# Patient Record
Sex: FEMALE | Race: BLACK OR AFRICAN AMERICAN | URBAN - METROPOLITAN AREA
[De-identification: names, ages, dates, MRNs, and addresses within clinical notes are randomized per-mention and may not be internally consistent; named-entity substitution may affect disease eponyms.]

---

## 2017-04-09 ENCOUNTER — EMERGENCY (EMERGENCY)
Facility: HOSPITAL | Age: 47
LOS: 1 days | Discharge: HOME | End: 2017-04-09
Admitting: EMERGENCY MEDICINE
Payer: MEDICAID

## 2017-04-09 VITALS
TEMPERATURE: 98 F | DIASTOLIC BLOOD PRESSURE: 79 MMHG | SYSTOLIC BLOOD PRESSURE: 121 MMHG | RESPIRATION RATE: 18 BRPM | HEART RATE: 74 BPM

## 2017-04-09 DIAGNOSIS — Z90.13 ACQUIRED ABSENCE OF BILATERAL BREASTS AND NIPPLES: Chronic | ICD-10-CM

## 2017-04-09 PROCEDURE — 99283 EMERGENCY DEPT VISIT LOW MDM: CPT

## 2017-04-09 NOTE — ED PROVIDER NOTE - PSH
History of  Section  '  and 2007  History of mastectomy, bilateral    Normal vaginal delivery  /  /    S/P knee surgery  ' :  Right Knee Arthroscopy  S/P tubal ligation  '

## 2017-04-09 NOTE — ED PROVIDER NOTE - NEURO NEGATIVE STATEMENT, MLM
no hemoptysis/no exertional dyspnea/no cough/no orthopnea no loss of consciousness, no gait abnormality, no headache, no sensory deficits, and no weakness.

## 2017-04-09 NOTE — ED PROVIDER NOTE - PMH
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 No surgery  Sickle cell trait

## 2017-04-09 NOTE — ED PROVIDER NOTE - OBJECTIVE STATEMENT
45 yo F with a PMHx of breast cancer (mastectomy 2013) scleritis, and pseudotumor of R orbit presents to the ED c/o irritated red eyes, photophobia, blurred vision. She states her pseudotumor and scleritis did not feel like this. She takes Motrin for HA. Denies HA, nausea, vomiting, foreign objects in eyes, eye discharge, double vision. She states she got fake eye lashes put on last Thursday and got them out yesterday. She claims her vision is currently better. 47 yo F with a PMHx of breast cancer (mastectomy 2013) scleritis, and pseudotumor of R orbit presents to the ED c/o irritated red eyes, photophobia, blurred vision. She states she got fake eye lashes put on last Thursday and got them out yesterday.  Pain is different from previous eye complaints.  She takes Motrin for HA. Denies HA, nausea, vomiting, foreign objects in eyes, eye discharge, double vision.  She claims her vision is currently better.

## 2017-04-09 NOTE — ED ADULT TRIAGE NOTE - CHIEF COMPLAINT QUOTE
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

## 2017-04-09 NOTE — ED PROVIDER NOTE - PLAN OF CARE
Follow up with your ophthalmologist this week.  Natural tears as needed.  Worsening pain, blurry vision, or any other symptoms of concern return to ED.

## 2017-04-09 NOTE — ED PROVIDER NOTE - MEDICAL DECISION MAKING DETAILS
Plan: discharge with optho follow up 45 yo F PMH scleritis, pseudotumor p/f eye irritation after removing false lashes, now improved, no foreign body or discharge noted on exam - natural tears, discharge with ophthalmology follow up

## 2017-04-09 NOTE — ED PROVIDER NOTE - PROGRESS NOTE DETAILS
The scribe's documentation has been prepared under my direction and personally reviewed by me in its entirety. I confirm that the note above accurately reflects all work, treatment, procedures, and medical decision making performed by me.  Isael Weaver PA-C

## 2017-04-09 NOTE — ED PROVIDER NOTE - CARE PLAN
Principal Discharge DX:	Eye redness  Instructions for follow-up, activity and diet:	Follow up with your ophthalmologist this week.  Natural tears as needed.  Worsening pain, blurry vision, or any other symptoms of concern return to ED.

## 2017-05-04 ENCOUNTER — APPOINTMENT (OUTPATIENT)
Dept: OPHTHALMOLOGY | Facility: CLINIC | Age: 47
End: 2017-05-04

## 2017-06-13 ENCOUNTER — APPOINTMENT (OUTPATIENT)
Dept: OPHTHALMOLOGY | Facility: CLINIC | Age: 47
End: 2017-06-13

## 2017-10-23 PROBLEM — Z00.00 ENCOUNTER FOR PREVENTIVE HEALTH EXAMINATION: Noted: 2017-10-23

## 2017-10-31 ENCOUNTER — APPOINTMENT (OUTPATIENT)
Dept: OPHTHALMOLOGY | Facility: CLINIC | Age: 47
End: 2017-10-31

## 2017-11-20 ENCOUNTER — INPATIENT (INPATIENT)
Facility: HOSPITAL | Age: 47
LOS: 1 days | Discharge: PSYCHIATRIC OR SUBSTANCE USE FACILITY | End: 2017-11-22
Attending: INTERNAL MEDICINE | Admitting: INTERNAL MEDICINE
Payer: MEDICARE

## 2017-11-20 VITALS
HEART RATE: 104 BPM | WEIGHT: 130.07 LBS | DIASTOLIC BLOOD PRESSURE: 54 MMHG | SYSTOLIC BLOOD PRESSURE: 97 MMHG | HEIGHT: 64 IN | OXYGEN SATURATION: 95 % | RESPIRATION RATE: 26 BRPM

## 2017-11-20 DIAGNOSIS — T50.902A POISONING BY UNSPECIFIED DRUGS, MEDICAMENTS AND BIOLOGICAL SUBSTANCES, INTENTIONAL SELF-HARM, INITIAL ENCOUNTER: ICD-10-CM

## 2017-11-20 DIAGNOSIS — Z90.13 ACQUIRED ABSENCE OF BILATERAL BREASTS AND NIPPLES: Chronic | ICD-10-CM

## 2017-11-20 LAB
ALBUMIN SERPL ELPH-MCNC: 3 G/DL — LOW (ref 3.3–5)
ALP SERPL-CCNC: 78 U/L — SIGNIFICANT CHANGE UP (ref 40–120)
ALT FLD-CCNC: 26 U/L — SIGNIFICANT CHANGE UP (ref 12–78)
AMPHET UR-MCNC: NEGATIVE — SIGNIFICANT CHANGE UP
ANION GAP SERPL CALC-SCNC: 11 MMOL/L — SIGNIFICANT CHANGE UP (ref 5–17)
APAP SERPL-MCNC: < 2 UG/ML (ref 10–30)
APPEARANCE UR: CLEAR — SIGNIFICANT CHANGE UP
AST SERPL-CCNC: 17 U/L — SIGNIFICANT CHANGE UP (ref 15–37)
BACTERIA # UR AUTO: ABNORMAL
BARBITURATES UR SCN-MCNC: NEGATIVE — SIGNIFICANT CHANGE UP
BASE EXCESS BLDA CALC-SCNC: -1.1 MMOL/L — SIGNIFICANT CHANGE UP (ref -2–2)
BASOPHILS NFR BLD AUTO: 1 % — SIGNIFICANT CHANGE UP (ref 0–2)
BENZODIAZ UR-MCNC: NEGATIVE — SIGNIFICANT CHANGE UP
BILIRUB SERPL-MCNC: 0.2 MG/DL — SIGNIFICANT CHANGE UP (ref 0.2–1.2)
BILIRUB UR-MCNC: NEGATIVE — SIGNIFICANT CHANGE UP
BLOOD GAS COMMENTS: SIGNIFICANT CHANGE UP
BLOOD GAS COMMENTS: SIGNIFICANT CHANGE UP
BLOOD GAS SOURCE: SIGNIFICANT CHANGE UP
BUN SERPL-MCNC: 11 MG/DL — SIGNIFICANT CHANGE UP (ref 7–23)
CALCIUM SERPL-MCNC: 7.9 MG/DL — LOW (ref 8.5–10.1)
CHLORIDE SERPL-SCNC: 106 MMOL/L — SIGNIFICANT CHANGE UP (ref 96–108)
CK SERPL-CCNC: 165 U/L — SIGNIFICANT CHANGE UP (ref 26–192)
CO2 SERPL-SCNC: 24 MMOL/L — SIGNIFICANT CHANGE UP (ref 22–31)
COCAINE METAB.OTHER UR-MCNC: NEGATIVE — SIGNIFICANT CHANGE UP
COLOR SPEC: YELLOW — SIGNIFICANT CHANGE UP
COMMENT - URINE: SIGNIFICANT CHANGE UP
CREAT SERPL-MCNC: 0.91 MG/DL — SIGNIFICANT CHANGE UP (ref 0.5–1.3)
DIFF PNL FLD: ABNORMAL
EOSINOPHIL NFR BLD AUTO: 2 % — SIGNIFICANT CHANGE UP (ref 0–6)
EPI CELLS # UR: ABNORMAL
ETHANOL SERPL-MCNC: 198 MG/DL — HIGH (ref 0–10)
GLUCOSE SERPL-MCNC: 154 MG/DL — HIGH (ref 70–99)
GLUCOSE UR QL: NEGATIVE MG/DL — SIGNIFICANT CHANGE UP
HCO3 BLDA-SCNC: 23 MMOL/L — SIGNIFICANT CHANGE UP (ref 21–29)
HCT VFR BLD CALC: 32.1 % — LOW (ref 34.5–45)
HGB BLD-MCNC: 10.4 G/DL — LOW (ref 11.5–15.5)
HOROWITZ INDEX BLDA+IHG-RTO: 21 — SIGNIFICANT CHANGE UP
KETONES UR-MCNC: NEGATIVE — SIGNIFICANT CHANGE UP
LEUKOCYTE ESTERASE UR-ACNC: NEGATIVE — SIGNIFICANT CHANGE UP
LYMPHOCYTES # BLD AUTO: 25 % — SIGNIFICANT CHANGE UP (ref 13–44)
MAGNESIUM SERPL-MCNC: 2.1 MG/DL — SIGNIFICANT CHANGE UP (ref 1.6–2.6)
MCHC RBC-ENTMCNC: 26 PG — LOW (ref 27–34)
MCHC RBC-ENTMCNC: 32.5 GM/DL — SIGNIFICANT CHANGE UP (ref 32–36)
MCV RBC AUTO: 80 FL — SIGNIFICANT CHANGE UP (ref 80–100)
METHADONE UR-MCNC: NEGATIVE — SIGNIFICANT CHANGE UP
MONOCYTES NFR BLD AUTO: 5 % — SIGNIFICANT CHANGE UP (ref 2–14)
NEUTROPHILS NFR BLD AUTO: 67 % — SIGNIFICANT CHANGE UP (ref 43–77)
NITRITE UR-MCNC: NEGATIVE — SIGNIFICANT CHANGE UP
OPIATES UR-MCNC: NEGATIVE — SIGNIFICANT CHANGE UP
PCO2 BLDA: 40 MMHG — SIGNIFICANT CHANGE UP (ref 32–46)
PCP SPEC-MCNC: SIGNIFICANT CHANGE UP
PCP SPEC-MCNC: SIGNIFICANT CHANGE UP
PCP UR-MCNC: NEGATIVE — SIGNIFICANT CHANGE UP
PH BLD: 7.38 — SIGNIFICANT CHANGE UP (ref 7.35–7.45)
PH UR: 5 — SIGNIFICANT CHANGE UP (ref 5–8)
PLAT MORPH BLD: NORMAL — SIGNIFICANT CHANGE UP
PLATELET # BLD AUTO: 214 K/UL — SIGNIFICANT CHANGE UP (ref 150–400)
PO2 BLDA: 97 MMHG — SIGNIFICANT CHANGE UP (ref 74–108)
POTASSIUM SERPL-MCNC: 2.9 MMOL/L — CRITICAL LOW (ref 3.5–5.3)
POTASSIUM SERPL-SCNC: 2.9 MMOL/L — CRITICAL LOW (ref 3.5–5.3)
PROT SERPL-MCNC: 7 GM/DL — SIGNIFICANT CHANGE UP (ref 6–8.3)
PROT UR-MCNC: NEGATIVE MG/DL — SIGNIFICANT CHANGE UP
RBC # BLD: 4.01 M/UL — SIGNIFICANT CHANGE UP (ref 3.8–5.2)
RBC # FLD: 15 % — SIGNIFICANT CHANGE UP (ref 11–15)
RBC BLD AUTO: NORMAL — SIGNIFICANT CHANGE UP
RBC CASTS # UR COMP ASSIST: SIGNIFICANT CHANGE UP /HPF (ref 0–4)
SALICYLATES SERPL-MCNC: <1.7 MG/DL — LOW (ref 2.8–20)
SAO2 % BLDA: 96 % — SIGNIFICANT CHANGE UP (ref 92–96)
SODIUM SERPL-SCNC: 141 MMOL/L — SIGNIFICANT CHANGE UP (ref 135–145)
SP GR SPEC: 1.01 — SIGNIFICANT CHANGE UP (ref 1.01–1.02)
THC UR QL: NEGATIVE — SIGNIFICANT CHANGE UP
UROBILINOGEN FLD QL: NEGATIVE MG/DL — SIGNIFICANT CHANGE UP
WBC # BLD: 5.9 K/UL — SIGNIFICANT CHANGE UP (ref 3.8–10.5)
WBC # FLD AUTO: 5.9 K/UL — SIGNIFICANT CHANGE UP (ref 3.8–10.5)
WBC UR QL: SIGNIFICANT CHANGE UP

## 2017-11-20 PROCEDURE — 99291 CRITICAL CARE FIRST HOUR: CPT

## 2017-11-20 PROCEDURE — 70450 CT HEAD/BRAIN W/O DYE: CPT | Mod: 26

## 2017-11-20 PROCEDURE — 72125 CT NECK SPINE W/O DYE: CPT | Mod: 26

## 2017-11-20 PROCEDURE — 71010: CPT | Mod: 26

## 2017-11-20 RX ORDER — ENOXAPARIN SODIUM 100 MG/ML
40 INJECTION SUBCUTANEOUS EVERY 24 HOURS
Qty: 0 | Refills: 0 | Status: DISCONTINUED | OUTPATIENT
Start: 2017-11-20 | End: 2017-11-22

## 2017-11-20 RX ORDER — SODIUM CHLORIDE 9 MG/ML
2000 INJECTION INTRAMUSCULAR; INTRAVENOUS; SUBCUTANEOUS ONCE
Qty: 0 | Refills: 0 | Status: COMPLETED | OUTPATIENT
Start: 2017-11-20 | End: 2017-11-20

## 2017-11-20 RX ORDER — SODIUM CHLORIDE 9 MG/ML
1000 INJECTION, SOLUTION INTRAVENOUS
Qty: 0 | Refills: 0 | Status: DISCONTINUED | OUTPATIENT
Start: 2017-11-20 | End: 2017-11-20

## 2017-11-20 RX ORDER — POTASSIUM CHLORIDE 20 MEQ
20 PACKET (EA) ORAL
Qty: 0 | Refills: 0 | Status: DISCONTINUED | OUTPATIENT
Start: 2017-11-20 | End: 2017-11-20

## 2017-11-20 RX ORDER — SODIUM BICARBONATE 1 MEQ/ML
50 SYRINGE (ML) INTRAVENOUS ONCE
Qty: 0 | Refills: 0 | Status: COMPLETED | OUTPATIENT
Start: 2017-11-20 | End: 2017-11-20

## 2017-11-20 RX ORDER — POTASSIUM CHLORIDE 20 MEQ
10 PACKET (EA) ORAL
Qty: 0 | Refills: 0 | Status: COMPLETED | OUTPATIENT
Start: 2017-11-20 | End: 2017-11-20

## 2017-11-20 RX ORDER — NALOXONE HYDROCHLORIDE 4 MG/.1ML
2 SPRAY NASAL ONCE
Qty: 0 | Refills: 0 | Status: COMPLETED | OUTPATIENT
Start: 2017-11-20 | End: 2017-11-20

## 2017-11-20 RX ADMIN — SODIUM CHLORIDE 2000 MILLILITER(S): 9 INJECTION INTRAMUSCULAR; INTRAVENOUS; SUBCUTANEOUS at 01:32

## 2017-11-20 RX ADMIN — Medication 100 MILLIEQUIVALENT(S): at 04:21

## 2017-11-20 RX ADMIN — Medication 100 MILLIEQUIVALENT(S): at 05:00

## 2017-11-20 RX ADMIN — ENOXAPARIN SODIUM 40 MILLIGRAM(S): 100 INJECTION SUBCUTANEOUS at 06:29

## 2017-11-20 RX ADMIN — SODIUM CHLORIDE 75 MILLILITER(S): 9 INJECTION, SOLUTION INTRAVENOUS at 12:41

## 2017-11-20 RX ADMIN — Medication 50 MILLIEQUIVALENT(S): at 01:24

## 2017-11-20 RX ADMIN — NALOXONE HYDROCHLORIDE 2 MILLIGRAM(S): 4 SPRAY NASAL at 01:10

## 2017-11-20 RX ADMIN — Medication 100 MILLIEQUIVALENT(S): at 03:10

## 2017-11-20 NOTE — ED PROVIDER NOTE - OBJECTIVE STATEMENT
Pertinent PMH/PSH/FHx/SHx and Review of Systems contained within:  48 yo f with PMH of breast Ca s/p bilateral total masectomy and depression BIBEMS for od. As per , patient reported feeling drowsy about 1140 but then later tonight she became unresponsive and he found her empty bottle of celexa. it is unknown what time she took them but the bottle had 29 100mg pills in it.  also reports that she took an unknown amount of Buspar. Pertinent PMH/PSH/FHx/SHx and Review of Systems contained within:  48 yo f with PMH of breast Ca s/p bilateral total masectomy and depression BIBEMS for od. As per , patient reported feeling drowsy about 1140 but then later tonight she became unresponsive and he found her empty bottle of celexa. it is unknown what time she took them but the bottle had 29 100mg pills in it.  also reports that she took an unknown amount of Buspar. Patient responding to pain only and not answering questions.

## 2017-11-20 NOTE — H&P ADULT - ASSESSMENT
Patient is a 47 year old F with history of breast CA s/p mastectomy, admitted to critical care for suicidal attempts with Overdose on SSRI and Alcohol intoxication.

## 2017-11-20 NOTE — H&P ADULT - HISTORY OF PRESENT ILLNESS
Patient is a 47y old  Female who presents with a chief complaint of     47y FemaleHPI:      PAST MEDICAL & SURGICAL HISTORY:  No pertinent past medical history  No significant past surgical history    FAMILY HISTORY:    Social History: Allergies    No Known Allergies    Intolerances        MEDICATIONS  (STANDING):  enoxaparin Injectable 40 milliGRAM(s) SubCutaneous every 24 hours    MEDICATIONS  (PRN):      REVIEW OF SYSTEMS:    CONSTITUTIONAL: No fever, weight loss, or fatigue  EYES: No eye pain, visual disturbances, or discharge  ENMT:  No difficulty hearing, tinnitus, vertigo; No sinus or throat pain  NECK: No pain or stiffness  BREASTS: No pain, masses, or nipple discharge  RESPIRATORY: No cough, wheezing, chills or hemoptysis; No shortness of breath  CARDIOVASCULAR: No chest pain, palpitations, dizziness, or leg swelling  GASTROINTESTINAL: No abdominal or epigastric pain. No nausea, vomiting, or hematemesis; No diarrhea or constipation. No melena or hematochezia.  GENITOURINARY: No dysuria, frequency, hematuria, or incontinence  NEUROLOGICAL: No headaches, memory loss, loss of strength, numbness, or tremors  SKIN: No itching, burning, rashes, or lesions   LYMPH NODES: No enlarged glands  ENDOCRINE: No heat or cold intolerance; No hair loss  MUSCULOSKELETAL: No joint pain or swelling; No muscle, back, or extremity pain  PSYCHIATRIC: No depression, anxiety, mood swings, or difficulty sleeping  HEME/LYMPH: No easy bruising, or bleeding gums  ALLERGY AND IMMUNOLOGIC: No hives or eczema      PHYSICAL EXAM:  ICU Vital Signs Last 24 Hrs  T(C): 36.6 (2017 05:50), Max: 36.6 (2017 05:50)  T(F): 97.8 (2017 05:50), Max: 97.8 (2017 05:50)  HR: 85 (2017 06:23) (85 - 110)  BP: 114/67 (2017 06:23) (88/52 - 119/77)  BP(mean): --  ABP: --  ABP(mean): --  RR: 13 (2017 06:23) (9 - 26)  SpO2: 96% (2017 06:23) (95% - 99%)    GENERAL: NAD, well-groomed, well-developed  HEAD:  Atraumatic, Normocephalic  EYES: EOMI, PERRLA, conjunctiva and sclera clear  NECK: Supple, No JVD, Normal thyroid  NERVOUS SYSTEM:  Alert & Oriented X3, Good concentration;   Motor Strength 5/5 B/L upper and lower extremities; DTRs 2+ intact and symmetric  CHEST/LUNG: CTAbilaterally; No rales, rhonchi, wheezing, or rubs  HEART: Regular rate and rhythm; No murmurs, rubs, or gallops  ABDOMEN: Soft, Nontender, Nondistended; Bowel sounds present  EXTREMITIES:  2+ Peripheral Pulses, No clubbing, cyanosis, or edema  SKIN: No rashes or lesions        LABS:                        10.4   5.9   )-----------( 214      ( 2017 01:17 )             32.1     11-    141  |  106  |  11  ----------------------------<  154<H>  2.9<LL>   |  24  |  0.91    Ca    7.9<L>      2017 01:17  Mg     2.1         TPro  7.0  /  Alb  3.0<L>  /  TBili  0.2  /  DBili  x   /  AST  17  /  ALT  26  /  AlkPhos  78  11-20      Urinalysis Basic - ( 2017 02:45 )    Color: Yellow / Appearance: Clear / S.015 / pH: x  Gluc: x / Ketone: Negative  / Bili: Negative / Urobili: Negative mg/dL   Blood: x / Protein: Negative mg/dL / Nitrite: Negative   Leuk Esterase: Negative / RBC: 0-2 /HPF / WBC 0-2   Sq Epi: x / Non Sq Epi: Moderate / Bacteria: Few Patient is a 47y old  Female with breast Ca s/p bilateral total mastectomy and depression  brought in by  for drug overdose. As per , patient reported feeling drowsy about 1140 am yesterday but then later tonight when  came home, he found patient  unresponsive and found a empty bottle of celexa as per ED note. it is unknown what time she took them but the bottle had 29 100mg pills in it.  also reports that she took an unknown amount of Buspar. Patient responding to pain only and not answering questions. ETOH level was 198, as per , patient does not drink alcohol. Patient received 1 amp of sodium bicarb.    PAST MEDICAL & SURGICAL HISTORY:  Breast CA s/p mastectomy

## 2017-11-20 NOTE — H&P ADULT - ATTENDING COMMENTS
47 F hx breast CA s/p mastectomy, depression admitted with AMS after overdose of etoh, buspar and SSRI. Pt had no symptoms prior to OD. Pt was minimally responsive last night but is now following commands and AOx2. Is sleepy but protecting airway. Remains on 1:1. Psych consult pending. Poison control evaluation recommends telemetry monitoring. QTC has normalized now, was higher before. Cont to monitor MS.

## 2017-11-20 NOTE — ED ADULT NURSE NOTE - OBJECTIVE STATEMENT
pt brought in by EMS for OD.  states pt took approx 29 Seroquel 100mg earlier this evening. Has been despondent over her grandmothers' passing and fighting with her  as well. He states pt does not use drugs, possibly drank alcohol tonight as well. Pt is bradypneic at 10 per min on O2 via NRB SpO2 is 95%. Hypotensive and tachy at triage. not responsive to painful stimuli. 20 ga IV placed by EMS in left hand. R arm 18 ga placed. Labs drawn and sent. EtCO2 is 36. EKG performed. 2mg Narcan given IV. Four liters of NS given. Pt straight cathed for urine. No signs of trauma noted. Pt occasionally screams out and kicks, and goes back to sleeping.  remains at bedside. 1:1 CO initiated for pt safety. pt brought in by EMS for OD.  states pt took approx 29 Seroquel 100mg earlier this evening. Has been despondent over her grandmothers' passing and fighting with her  as well. He states pt does not use drugs, possibly drank alcohol tonight as well. Pt is bradypneic at 10 per min on O2 via NRB SpO2 is 95%. Hypotensive and tachy at triage. not responsive to painful stimuli. 20 ga IV placed by EMS in left hand. R arm 18 ga placed. Labs drawn and sent. EtCO2 is 36. EKG performed. 2mg Narcan given IV. Four liters of NS given. Pt straight cathed for urine. R clavicle has small bruise. No other trauma noted.  Pt occasionally screams out and kicks, and goes back to sleeping.  remains at bedside. 1:1 CO initiated for pt safety.

## 2017-11-20 NOTE — H&P ADULT - NSHPPHYSICALEXAM_GEN_ALL_CORE
LABS:                        10.4   5.9   )-----------( 214      ( 2017 01:17 )             32.1     11-    141  |  106  |  11  ----------------------------<  154<H>  2.9<LL>   |  24  |  0.91    Ca    7.9<L>      2017 01:17  Mg     2.1         TPro  7.0  /  Alb  3.0<L>  /  TBili  0.2  /  DBili  x   /  AST  17  /  ALT  26  /  AlkPhos  78  11-      Urinalysis Basic - ( 2017 02:45 )    Color: Yellow / Appearance: Clear / S.015 / pH: x  Gluc: x / Ketone: Negative  / Bili: Negative / Urobili: Negative mg/dL   Blood: x / Protein: Negative mg/dL / Nitrite: Negative   Leuk Esterase: Negative / RBC: 0-2 /HPF / WBC 0-2   Sq Epi: x / Non Sq Epi: Moderate / Bacteria: Few T(C): 36.6 (20 Nov 2017 05:50), Max: 36.6 (20 Nov 2017 05:50)  T(F): 97.8 (20 Nov 2017 05:50), Max: 97.8 (20 Nov 2017 05:50)  HR: 85 (20 Nov 2017 06:23) (85 - 110)  BP: 114/67 (20 Nov 2017 06:23) (88/52 - 119/77)  RR: 13 (20 Nov 2017 06:23) (9 - 26)  SpO2: 96% (20 Nov 2017 06:23) (95% - 99%)    GENERAL: NAD, well-groomed, well-developed  HEAD:  Atraumatic, Normocephalic  EYES: EOMI, PERRLA, conjunctiva and sclera clear  NECK: Supple, No JVD, Normal thyroid  NERVOUS SYSTEM: lethargy, mumbles to painful stimuli  CHEST/LUNG: CTA bilaterally; No rales, rhonchi, wheezing, or rubs  HEART: Regular rate and rhythm; No murmurs, rubs, or gallops  ABDOMEN: Soft, Nontender, Nondistended; Bowel sounds present  EXTREMITIES:  2+ Peripheral Pulses, No clubbing, cyanosis, or edema  SKIN: No rashes or lesions

## 2017-11-20 NOTE — ED PROVIDER NOTE - MEDICAL DECISION MAKING DETAILS
labs and imaging reviewed. patient received ivfs and bicarb. patient now admitted to icu for further management.

## 2017-11-20 NOTE — H&P ADULT - NSHPLABSRESULTS_GEN_ALL_CORE
LABS:                        10.4   5.9   )-----------( 214      ( 2017 01:17 )             32.1     11-    141  |  106  |  11  ----------------------------<  154<H>  2.9<LL>   |  24  |  0.91    Ca    7.9<L>      2017 01:17  Mg     2.1         TPro  7.0  /  Alb  3.0<L>  /  TBili  0.2  /  DBili  x   /  AST  17  /  ALT  26  /  AlkPhos  78  11-      Urinalysis Basic - ( 2017 02:45 )    Color: Yellow / Appearance: Clear / S.015 / pH: x  Gluc: x / Ketone: Negative  / Bili: Negative / Urobili: Negative mg/dL   Blood: x / Protein: Negative mg/dL / Nitrite: Negative   Leuk Esterase: Negative / RBC: 0-2 /HPF / WBC 0-2   Sq Epi: x / Non Sq Epi: Moderate / Bacteria: Few

## 2017-11-20 NOTE — ED PROVIDER NOTE - CRITICAL CARE PROVIDED
documentation/direct patient care (not related to procedure)/consult w/ pt's family directly relating to pts condition/additional history taking/interpretation of diagnostic studies/consultation with other physicians

## 2017-11-20 NOTE — H&P ADULT - PROBLEM SELECTOR PLAN 1
-overdose on ?celexa   -poison control was called: needs telemetry monitoring  and frequent EKG and supportive care  QTc has been between 500-512  -needs one to one observation  -psychiatry consult  -airway monitoring  -abg  -sodium bicarb drip if needed.

## 2017-11-21 DIAGNOSIS — T50.901A POISONING BY UNSPECIFIED DRUGS, MEDICAMENTS AND BIOLOGICAL SUBSTANCES, ACCIDENTAL (UNINTENTIONAL), INITIAL ENCOUNTER: ICD-10-CM

## 2017-11-21 DIAGNOSIS — F10.10 ALCOHOL ABUSE, UNCOMPLICATED: ICD-10-CM

## 2017-11-21 DIAGNOSIS — F32.9 MAJOR DEPRESSIVE DISORDER, SINGLE EPISODE, UNSPECIFIED: ICD-10-CM

## 2017-11-21 DIAGNOSIS — F41.9 ANXIETY DISORDER, UNSPECIFIED: ICD-10-CM

## 2017-11-21 LAB
ANION GAP SERPL CALC-SCNC: 9 MMOL/L — SIGNIFICANT CHANGE UP (ref 5–17)
BUN SERPL-MCNC: 10 MG/DL — SIGNIFICANT CHANGE UP (ref 7–23)
CALCIUM SERPL-MCNC: 7.5 MG/DL — LOW (ref 8.5–10.1)
CHLORIDE SERPL-SCNC: 112 MMOL/L — HIGH (ref 96–108)
CO2 SERPL-SCNC: 25 MMOL/L — SIGNIFICANT CHANGE UP (ref 22–31)
CREAT SERPL-MCNC: 0.99 MG/DL — SIGNIFICANT CHANGE UP (ref 0.5–1.3)
GLUCOSE SERPL-MCNC: 75 MG/DL — SIGNIFICANT CHANGE UP (ref 70–99)
HCT VFR BLD CALC: 28.5 % — LOW (ref 34.5–45)
HGB BLD-MCNC: 9.1 G/DL — LOW (ref 11.5–15.5)
MAGNESIUM SERPL-MCNC: 1.8 MG/DL — SIGNIFICANT CHANGE UP (ref 1.6–2.6)
MCHC RBC-ENTMCNC: 25.7 PG — LOW (ref 27–34)
MCHC RBC-ENTMCNC: 32 GM/DL — SIGNIFICANT CHANGE UP (ref 32–36)
MCV RBC AUTO: 80.3 FL — SIGNIFICANT CHANGE UP (ref 80–100)
PHOSPHATE SERPL-MCNC: 2.3 MG/DL — LOW (ref 2.5–4.5)
PLATELET # BLD AUTO: 238 K/UL — SIGNIFICANT CHANGE UP (ref 150–400)
POTASSIUM SERPL-MCNC: 3.6 MMOL/L — SIGNIFICANT CHANGE UP (ref 3.5–5.3)
POTASSIUM SERPL-SCNC: 3.6 MMOL/L — SIGNIFICANT CHANGE UP (ref 3.5–5.3)
RBC # BLD: 3.55 M/UL — LOW (ref 3.8–5.2)
RBC # FLD: 15.5 % — HIGH (ref 11–15)
SODIUM SERPL-SCNC: 146 MMOL/L — HIGH (ref 135–145)
T3 SERPL-MCNC: 72 NG/DL — LOW (ref 80–200)
T4 AB SER-ACNC: 6.2 UG/DL — SIGNIFICANT CHANGE UP (ref 4.6–12)
TSH SERPL-MCNC: 0.82 UU/ML — SIGNIFICANT CHANGE UP (ref 0.36–3.74)
WBC # BLD: 8 K/UL — SIGNIFICANT CHANGE UP (ref 3.8–10.5)
WBC # FLD AUTO: 8 K/UL — SIGNIFICANT CHANGE UP (ref 3.8–10.5)

## 2017-11-21 PROCEDURE — 93010 ELECTROCARDIOGRAM REPORT: CPT

## 2017-11-21 PROCEDURE — 90792 PSYCH DIAG EVAL W/MED SRVCS: CPT

## 2017-11-21 PROCEDURE — 99233 SBSQ HOSP IP/OBS HIGH 50: CPT

## 2017-11-21 RX ADMIN — ENOXAPARIN SODIUM 40 MILLIGRAM(S): 100 INJECTION SUBCUTANEOUS at 15:41

## 2017-11-21 NOTE — BEHAVIORAL HEALTH ASSESSMENT NOTE - OTHER
multiple stressors unable to fully assess but irritable, vague, and guarded in her answers did not assess concrete grossly intact- formal mmse not done

## 2017-11-21 NOTE — BEHAVIORAL HEALTH ASSESSMENT NOTE - NSBHCONSULTMEDS_PSY_A_CORE FT
Holding off on starting an antidepressant at this point till diagnosis can be clarified to rule out diagnosis of Bipolar disorder.

## 2017-11-21 NOTE — BEHAVIORAL HEALTH ASSESSMENT NOTE - OTHER PAST PSYCHIATRIC HISTORY (INCLUDE DETAILS REGARDING ONSET, COURSE OF ILLNESS, INPATIENT/OUTPATIENT TREATMENT)
has a psychiatric history notable for Depression, Anxiety, one inpatient psychiatric admission (as per patient- no records in [facility]) in the 90's after an overdose on medications, does not have a psychiatric provider in the community currently, likely has a history of alcohol abuse (bal: 198 upon ed admission), no known legal issues

## 2017-11-21 NOTE — BEHAVIORAL HEALTH ASSESSMENT NOTE - HPI (INCLUDE ILLNESS QUALITY, SEVERITY, DURATION, TIMING, CONTEXT, MODIFYING FACTORS, ASSOCIATED SIGNS AND SYMPTOMS)
Briefly, the patient is a 47 year old woman, , lives at home with 4 of her adult children and her , is unemployed, has a medical history notable for Breast Cancer- s/p bilateral mastectomy, has a psychiatric history notable for Depression, Anxiety, one inpatient psychiatric admission (as per patient- no records in [facility]) in the 90's after an overdose on medications, does not have a psychiatric provider in the community currently, likely has a history of alcohol abuse (bal: 198 upon ed admission), no known legal issues, presented to the ed on 11/20/2017 after  found her unresponsive at home with an empty bottle of Seroquel. Patient remained unresponsive in the ed, was maintaining her airway and so intubation was not needed. Stabilized-monitored in the CCU and now stepping down to the medical floor.   Met with the patient and her daughter Tarun by her side. Patient can be noted to be irritable, evasive, vague and guarded in her answers. TP tends to be rather concrete, with difficulty to engage patient in an elaboration. States- ' I don't know what happened. I am recovering as you see'. She can be noted to be tearful during the interview. Briefly, the patient is a 47 year old woman, , lives at home with 4 of her adult children and her , is unemployed, has a medical history notable for Breast Cancer- s/p bilateral mastectomy, has a psychiatric history notable for Depression, Anxiety, one inpatient psychiatric admission (as per patient- no records in [facility]) in the 90's after an overdose on medications, does not have a psychiatric provider in the community currently, likely has a history of alcohol abuse (bal: 198 upon ed admission), no known legal issues, presented to the ed on 11/20/2017 after  found her unresponsive at home with an empty bottle of Seroquel. Patient remained unresponsive in the ed, was maintaining her airway and so intubation was not needed. Stabilized-monitored in the CCU and now stepping down to the medical floor.   Met with the patient and her daughter Tarun by her side. Patient can be noted to be irritable, evasive, vague and guarded in her answers. TP tends to be rather concrete, with difficulty to engage patient in an elaboration. When asked about the circumstances that led to her admission, she states- ' I don't know what happened. I am recovering as you see'. She can be noted to be tearful during the interview. She admits to ongoing symptoms of depression for several years- sadness, irritability, mood lability but this mood state to be pervasive, and denies any hopelessness, denies any sleep or appetite changes when asked. She states that she has been experiencing stressors- marital,  might be cheating on her, strained relationship between children- her , unemployment, finances, etc. States- ' I am under stress', and admits to ongoing symptoms of anxiety which appears more generalized in nature, with intermittent panic attack like symptoms. Denies any psychosis when asked. Did not explore trauma or PTSD symptoms as this setting was not appropriate- this will need to be explored further. Regards to mood lability, patient admits to a history of quick anger outbursts in context to marital issues, impulsivity, and more recently spending [id number]$ impulsively. Unable to fully rule out if a history of Hypomania over the years.   Discussed alcohol use, and patient minimizes use, and states that she is only an occasional drinker. No history of other drugs or substance abuse treatment.    Tried to get collateral from the patient's daughter who was at her bedside, but she as well seemed nonchalant about the attempt, and seemed to not report any concerns.

## 2017-11-21 NOTE — PHYSICAL THERAPY INITIAL EVALUATION ADULT - ADDITIONAL COMMENTS
as per previous emr: Prior to admission pt lives with her mother & daughters in a private home, 3 entry steps (no rail), pt stays on 1st floor. Prior to admission pt was independent with all functional mobility including community ambulation without a device. Pt is right hand dominant, drives, & is currently unemployed. Goal of therapy per daughters: get better.

## 2017-11-21 NOTE — BEHAVIORAL HEALTH ASSESSMENT NOTE - RISK ASSESSMENT
elevated: depression, anxiety, impulsivity, lability, substance abuse, s/p overdose on seroquel with unclear intent, multiple stressors, history of one inpatient psychiatric admission for similar overdose as per patient, no psychiatric providers in the community, does not engage in safety, treatment planning.

## 2017-11-21 NOTE — BEHAVIORAL HEALTH ASSESSMENT NOTE - DIFFERENTIAL
a r/o diagnosis of Bipolar disorder needs to be explored. Suspecting a personality disorder component as well.

## 2017-11-21 NOTE — PROGRESS NOTE ADULT - ASSESSMENT
47 F hx breast CA s/p mastectomy, depression admitted with AMS after overdose of etoh, buspar and SSRI.   Pt's encephalopathy fully resolved. Will need inpt psych admission.    Neuro  returned to baseline  no further intervention for OD  Psych reccs appreciated  hold off on restarting psych medications until bipolar r/o    [facility]  qtc and qrs normal now  hemodynamically stable    Renal  mild hypernatremia -encourage free water intake  does not need D5w right now    Transfer to Floors

## 2017-11-21 NOTE — BEHAVIORAL HEALTH ASSESSMENT NOTE - SUMMARY
Briefly, the patient is a 47 year old woman, , lives at home with 4 of her adult children and her , is unemployed, has a medical history notable for Breast Cancer- s/p bilateral mastectomy, has a psychiatric history notable for Depression, Anxiety, one inpatient psychiatric admission (as per patient- no records in [facility]) in the 90's after an overdose on medications, does not have a psychiatric provider in the community currently, likely has a history of alcohol abuse (bal: 198 upon ed admission), no known legal issues, presented to the ed on 11/20/2017 after  found her unresponsive at home with an empty bottle of Seroquel. Patient remained unresponsive in the ed, was maintaining her airway and so intubation was not needed. Stabilized-monitored in the CCU and now stepping down to the medical floor.   Based on assessment and rather limited information obtained from daughter, patient can be noted to be irritable, evasive, vague and guarded in her answers. TP tends to be rather concrete, with difficulty to engage patient in an elaboration. She admits to symptoms of depression, anxiety, lability, impulsivity in the context of ongoing stressors michael marital stress. She presents s/p overdose on Seroquel while intoxicated, and does not elaborate on the intent (SI vs impulsivity) during the interview. She makes poor eye contact.   At this point based on the above, and considering h/o prior overdose, patient's risk is elevated, and the patient requires an inpatient psychiatric admission for further symptom monitoring, diagnosis clarification, medication management, and ensuring safety.   For now will give a diagnosis of MDD without psychosis, Anxiety disorder, Alcohol abuse, but a r/o diagnosis of Bipolar disorder needs to be explored. Suspecting a personality disorder component as well.

## 2017-11-21 NOTE — CHART NOTE - NSCHARTNOTEFT_GEN_A_CORE
Pt medically stable for transfer to medical floor.      47 F hx breast CA s/p mastectomy, depression admitted with AMS after overdose of etoh and seroquel. Pt had no symptoms prior to OD. Pt was minimally responsive initially but is now following commands and AOx3.  Remains on 1:1. Psych consult done.  Will recommend in-patient therapy after 24 hours medical observation. Pt medically stable for transfer to medical floor.  Pt signed out to Dr. Pollard    47 F hx breast CA s/p mastectomy, depression admitted with AMS after overdose of etoh and seroquel. Pt had no symptoms prior to OD. Pt was minimally responsive initially but is now following commands and AOx3.  Remains on 1:1. Psych consult done.  Will recommend in-patient therapy after 24 hours medical observation.

## 2017-11-21 NOTE — BEHAVIORAL HEALTH ASSESSMENT NOTE - NSBHCONSULTFOLLOWDETAILS_PSY_A_CORE FT
Patient will require an inpatient psychiatric admission for symptom monitoring, medication management and ensuring safety. Discussed with patient- she is ambivalent. Signed 2PC. Transfer will be initiated when patient is medically cleared. Requires 24 hours monitoring on medical floor.

## 2017-11-21 NOTE — PHYSICAL THERAPY INITIAL EVALUATION ADULT - MODIFIED CLINICAL TEST OF SENSORY INTEGRATION IN BALANCE TEST
Barthel Index: Feeding Score _10__, Bathing Score __5_, Grooming Score __5_, Dressing Score _5__, Bowels Score __10_, Bladder Score __10_, Toilet Score _5__, Transfers Score _10__, Mobility Score _0__, Stairs Score _0__,     Total Score __60_

## 2017-11-22 ENCOUNTER — TRANSCRIPTION ENCOUNTER (OUTPATIENT)
Age: 47
End: 2017-11-22

## 2017-11-22 ENCOUNTER — INPATIENT (INPATIENT)
Facility: HOSPITAL | Age: 47
LOS: 6 days | Discharge: HOME | End: 2017-11-29
Attending: PSYCHIATRY & NEUROLOGY | Admitting: PSYCHIATRY & NEUROLOGY
Payer: MEDICAID

## 2017-11-22 VITALS — WEIGHT: 154.98 LBS | HEIGHT: 63 IN | TEMPERATURE: 99 F | RESPIRATION RATE: 19 BRPM

## 2017-11-22 VITALS
SYSTOLIC BLOOD PRESSURE: 129 MMHG | HEART RATE: 90 BPM | OXYGEN SATURATION: 98 % | TEMPERATURE: 98 F | RESPIRATION RATE: 17 BRPM | DIASTOLIC BLOOD PRESSURE: 87 MMHG

## 2017-11-22 DIAGNOSIS — T50.902D POISONING BY UNSPECIFIED DRUGS, MEDICAMENTS AND BIOLOGICAL SUBSTANCES, INTENTIONAL SELF-HARM, SUBSEQUENT ENCOUNTER: ICD-10-CM

## 2017-11-22 DIAGNOSIS — F33.1 MAJOR DEPRESSIVE DISORDER, RECURRENT, MODERATE: ICD-10-CM

## 2017-11-22 DIAGNOSIS — F32.1 MAJOR DEPRESSIVE DISORDER, SINGLE EPISODE, MODERATE: ICD-10-CM

## 2017-11-22 DIAGNOSIS — Z90.13 ACQUIRED ABSENCE OF BILATERAL BREASTS AND NIPPLES: Chronic | ICD-10-CM

## 2017-11-22 LAB
ANION GAP SERPL CALC-SCNC: 8 MMOL/L — SIGNIFICANT CHANGE UP (ref 5–17)
BUN SERPL-MCNC: 13 MG/DL — SIGNIFICANT CHANGE UP (ref 7–23)
CALCIUM SERPL-MCNC: 8.2 MG/DL — LOW (ref 8.5–10.1)
CHLORIDE SERPL-SCNC: 107 MMOL/L — SIGNIFICANT CHANGE UP (ref 96–108)
CO2 SERPL-SCNC: 27 MMOL/L — SIGNIFICANT CHANGE UP (ref 22–31)
CREAT SERPL-MCNC: 0.9 MG/DL — SIGNIFICANT CHANGE UP (ref 0.5–1.3)
GLUCOSE SERPL-MCNC: 99 MG/DL — SIGNIFICANT CHANGE UP (ref 70–99)
HCT VFR BLD CALC: 33.2 % — LOW (ref 34.5–45)
HGB BLD-MCNC: 10.6 G/DL — LOW (ref 11.5–15.5)
MAGNESIUM SERPL-MCNC: 2 MG/DL — SIGNIFICANT CHANGE UP (ref 1.6–2.6)
MCHC RBC-ENTMCNC: 26.4 PG — LOW (ref 27–34)
MCHC RBC-ENTMCNC: 32 GM/DL — SIGNIFICANT CHANGE UP (ref 32–36)
MCV RBC AUTO: 82.5 FL — SIGNIFICANT CHANGE UP (ref 80–100)
PHOSPHATE SERPL-MCNC: 3.6 MG/DL — SIGNIFICANT CHANGE UP (ref 2.5–4.5)
PLATELET # BLD AUTO: 220 K/UL — SIGNIFICANT CHANGE UP (ref 150–400)
POTASSIUM SERPL-MCNC: 3.7 MMOL/L — SIGNIFICANT CHANGE UP (ref 3.5–5.3)
POTASSIUM SERPL-SCNC: 3.7 MMOL/L — SIGNIFICANT CHANGE UP (ref 3.5–5.3)
RBC # BLD: 4.03 M/UL — SIGNIFICANT CHANGE UP (ref 3.8–5.2)
RBC # FLD: 14.8 % — SIGNIFICANT CHANGE UP (ref 11–15)
SODIUM SERPL-SCNC: 142 MMOL/L — SIGNIFICANT CHANGE UP (ref 135–145)
WBC # BLD: 6.8 K/UL — SIGNIFICANT CHANGE UP (ref 3.8–10.5)
WBC # FLD AUTO: 6.8 K/UL — SIGNIFICANT CHANGE UP (ref 3.8–10.5)

## 2017-11-22 PROCEDURE — 99222 1ST HOSP IP/OBS MODERATE 55: CPT

## 2017-11-22 PROCEDURE — 99239 HOSP IP/OBS DSCHRG MGMT >30: CPT

## 2017-11-22 RX ORDER — HALOPERIDOL DECANOATE 100 MG/ML
5 INJECTION INTRAMUSCULAR EVERY 6 HOURS
Qty: 0 | Refills: 0 | Status: DISCONTINUED | OUTPATIENT
Start: 2017-11-22 | End: 2017-11-29

## 2017-11-22 RX ORDER — HYDROXYZINE HCL 10 MG
50 TABLET ORAL EVERY 6 HOURS
Qty: 0 | Refills: 0 | Status: DISCONTINUED | OUTPATIENT
Start: 2017-11-22 | End: 2017-11-29

## 2017-11-22 RX ORDER — CITALOPRAM 10 MG/1
1 TABLET, FILM COATED ORAL
Qty: 0 | Refills: 0 | COMMUNITY

## 2017-11-22 RX ORDER — THIAMINE MONONITRATE (VIT B1) 100 MG
100 TABLET ORAL DAILY
Qty: 0 | Refills: 0 | Status: COMPLETED | OUTPATIENT
Start: 2017-11-22 | End: 2017-11-25

## 2017-11-22 RX ORDER — DIPHENHYDRAMINE HCL 50 MG
50 CAPSULE ORAL EVERY 6 HOURS
Qty: 0 | Refills: 0 | Status: DISCONTINUED | OUTPATIENT
Start: 2017-11-22 | End: 2017-11-29

## 2017-11-22 RX ORDER — PANTOPRAZOLE SODIUM 20 MG/1
40 TABLET, DELAYED RELEASE ORAL
Qty: 0 | Refills: 0 | Status: DISCONTINUED | OUTPATIENT
Start: 2017-11-22 | End: 2017-11-29

## 2017-11-22 RX ORDER — ACETAMINOPHEN 500 MG
325 TABLET ORAL EVERY 6 HOURS
Qty: 0 | Refills: 0 | Status: DISCONTINUED | OUTPATIENT
Start: 2017-11-22 | End: 2017-11-29

## 2017-11-22 RX ORDER — CITALOPRAM 10 MG/1
20 TABLET, FILM COATED ORAL DAILY
Qty: 0 | Refills: 0 | Status: DISCONTINUED | OUTPATIENT
Start: 2017-11-22 | End: 2017-11-22

## 2017-11-22 RX ORDER — TRAZODONE HCL 50 MG
50 TABLET ORAL AT BEDTIME
Qty: 0 | Refills: 0 | Status: DISCONTINUED | OUTPATIENT
Start: 2017-11-22 | End: 2017-11-24

## 2017-11-22 RX ORDER — DIPHENHYDRAMINE HCL 50 MG
50 CAPSULE ORAL ONCE
Qty: 0 | Refills: 0 | Status: DISCONTINUED | OUTPATIENT
Start: 2017-11-22 | End: 2017-11-29

## 2017-11-22 RX ORDER — SERTRALINE 25 MG/1
25 TABLET, FILM COATED ORAL DAILY
Qty: 0 | Refills: 0 | Status: DISCONTINUED | OUTPATIENT
Start: 2017-11-22 | End: 2017-11-24

## 2017-11-22 RX ORDER — HALOPERIDOL DECANOATE 100 MG/ML
5 INJECTION INTRAMUSCULAR ONCE
Qty: 0 | Refills: 0 | Status: DISCONTINUED | OUTPATIENT
Start: 2017-11-22 | End: 2017-11-29

## 2017-11-22 RX ADMIN — Medication 325 MILLIGRAM(S): at 23:52

## 2017-11-22 RX ADMIN — Medication 50 MILLIGRAM(S): at 21:07

## 2017-11-22 RX ADMIN — Medication 10 MILLIGRAM(S): at 21:07

## 2017-11-22 RX ADMIN — ENOXAPARIN SODIUM 40 MILLIGRAM(S): 100 INJECTION SUBCUTANEOUS at 11:36

## 2017-11-22 NOTE — DISCHARGE NOTE ADULT - CARE PROVIDER_API CALL
GIANNI,   Phone: (   )    -  Fax: (   )    -    Erika Villalobos (MIGUEL), Psychiatry  [address] [place] [id number]  Phone: [phone]  Fax: [phone]

## 2017-11-22 NOTE — DISCHARGE NOTE ADULT - NSTOBACCOHOTLINE_GEN_A_CS
[facility] Smokers Quitline ([phone]) [facility] Smokers Quitline ([phone])

## 2017-11-22 NOTE — PROGRESS NOTE BEHAVIORAL HEALTH - NSBHFUPINTERVALHXFT_PSY_A_CORE
Met with the patient this morning. Appropriately groomed. Still seems nonchalant about the overdose and does not appear to show any concerns about it. Seems disconnected affect wise. Again tried to engage the patient in a discussion regarding ongoing symptoms of depression, and anxiety, and admits to sadness, poor sleep at night at home, but denies it to be a pervasive mood. Anxiety more generalized in nature in context to ongoing stressors, with intermittent panic attack like symptoms. She denies any si or hi today. No psychosis. States that she drinks alcohol only on the weekends, but considering her history of alcohol abuse, suspecting that she is minimizing her use.   States- ' I don't know why I did what I did'.   Unable to truly assess if od was impulsive in context to intoxication or intentional. Patient has a history of similar OD/suicide attempt in the 90's (unable to access the record) which required an inpatient psychiatric admission to [facility].

## 2017-11-22 NOTE — DISCHARGE NOTE ADULT - CARE PLAN
Principal Discharge DX:	Drug toxicity  Goal:	Psychiatrist at inpatient hospital to evaluate medications and avoid intentional overdose  Instructions for follow-up, activity and diet:	Psychiatrist at inpatient hospital to evaluate medications and avoid intentional overdose  Secondary Diagnosis:	Overdose  Instructions for follow-up, activity and diet:	Psychiatrist at inpatient hospital to evaluate medications and avoid intentional overdose  Secondary Diagnosis:	Anxiety disorder  Instructions for follow-up, activity and diet:	Psychiatrist at inpatient hospital to evaluate medications and avoid intentional overdose  Secondary Diagnosis:	Major depressive disorder  Instructions for follow-up, activity and diet:	Psychiatrist at inpatient hospital to evaluate medications and avoid intentional overdose  Secondary Diagnosis:	Suicide by drug overdose, initial encounter  Instructions for follow-up, activity and diet:	Inpatient psych

## 2017-11-22 NOTE — DISCHARGE NOTE ADULT - MEDICATION SUMMARY - MEDICATIONS TO TAKE
I will START or STAY ON the medications listed below when I get home from the hospital:    Protonix 40 mg oral delayed release tablet  -- 1 tab(s) by mouth once a day  -- Indication: For GERD

## 2017-11-22 NOTE — PROGRESS NOTE BEHAVIORAL HEALTH - RISK ASSESSMENT
depression, anxiety, impulsivity, lability, substance abuse, s/p overdose on seroquel with unclear intent, multiple stressors, history of one inpatient psychiatric admission for similar overdose as per patient, no psychiatric providers in the community

## 2017-11-22 NOTE — DISCHARGE NOTE ADULT - PATIENT PORTAL LINK FT
“You can access the Follow[facility] Patient Portal, offered by [facility], by registering with the following website: [email or web address]”

## 2017-11-22 NOTE — DISCHARGE NOTE ADULT - MEDICATION SUMMARY - MEDICATIONS TO STOP TAKING
I will STOP taking the medications listed below when I get home from the hospital:    CeleXA 20 mg oral tablet  -- 1 tab(s) by mouth once a day    busPIRone 5 mg oral tablet  -- 1 tab(s) by mouth once a day

## 2017-11-22 NOTE — PROGRESS NOTE BEHAVIORAL HEALTH - NSBHCONSULTPSYCHPLAN_PSY_A_CORE FT
Diagnosis clarification. If MDD would benefit from SSRI trial. If possibility of Bipolar disorder, would start with Mood stabilizer- Lamictal perhaps.

## 2017-11-22 NOTE — DISCHARGE NOTE ADULT - PLAN OF CARE
Psychiatrist at inpatient hospital to evaluate medications and avoid intentional overdose Inpatient psych

## 2017-11-22 NOTE — PROGRESS NOTE BEHAVIORAL HEALTH - SUMMARY
Briefly, the patient is a 47 year old woman, , lives at home with 4 of her adult children and her , is unemployed, has a medical history notable for Breast Cancer- s/p bilateral mastectomy, has a psychiatric history notable for Depression, Anxiety, one inpatient psychiatric admission (as per patient- no records in [facility]) in the 90's after an overdose on medications, does not have a psychiatric provider in the community currently, likely has a history of alcohol abuse (bal: 198 upon ed admission), no known legal issues, presented to the ed on 11/20/2017 after  found her unresponsive at home with an empty bottle of Seroquel. Patient remained unresponsive in the ed, was maintaining her airway and so intubation was not needed. Stabilized-monitored in the CCU and now stepping down to the medical floor.   Based on assessment and rather limited information obtained from daughter, patient can be noted to be still evasive, vague, nonchalant and guarded in her answers. TP tends to be rather concrete, with difficulty to engage patient in an elaboration. She admits to symptoms of depression, anxiety, lability, impulsivity in the context of ongoing stressors michael marital stress. She presents s/p overdose on Seroquel while intoxicated, and does not elaborate on the intent (SI vs impulsivity) during the interview.   At this point based on the above, and considering h/o prior overdose/suicide attempt, ongoing substance abuse, impulsivity, patient's risk is elevated, and the patient requires an inpatient psychiatric admission for further symptom monitoring, diagnosis clarification, medication management, and ensuring safety.   For now will give a diagnosis of MDD without psychosis, Anxiety disorder, Alcohol abuse, but a r/o diagnosis of Bipolar disorder needs to be explored. Suspecting a personality disorder component as well.  Signout given to Dr Harvey- on 2w

## 2017-11-22 NOTE — DISCHARGE NOTE ADULT - HOSPITAL COURSE
47 F hx breast CA s/p mastectomy, depression admitted with AMS after overdose of etoh, buspar and SSRI.   Pt's encephalopathy fully resolved. Will need inpt psych admission. Pt stable for discharge to inpatient psych facility.    Neuro  returned to baseline  no further intervention for OD  Psych reccs appreciated  hold off on restarting psych medications until bipolar r/o    CVS  qtc and qrs normal now  hemodynamically stable    Renal  mild hypernatremia -encourage free water intake  does not need D5w right now    Transfer to Floors 47 F hx breast CA s/p mastectomy, depression admitted with AMS after overdose of etoh, buspar and SSRI.   Pt's encephalopathy fully resolved. Will need inpt psych admission. Pt stable for discharge to inpatient psych facility.    Neuro  returned to baseline  no further intervention for OD  Psych reccs appreciated  hold off on restarting psych medications until bipolar r/o    CVS  qtc and qrs normal now  hemodynamically stable    Renal  mild hypernatremia -encourage free water intake  does not need D5w right now    D/C To inpatient Psych    35min spent on d/c planning

## 2017-11-23 LAB
ALBUMIN SERPL ELPH-MCNC: 4 G/DL — SIGNIFICANT CHANGE UP (ref 3.3–5)
ALP SERPL-CCNC: 81 U/L — SIGNIFICANT CHANGE UP (ref 40–120)
ALT FLD-CCNC: 19 U/L — SIGNIFICANT CHANGE UP (ref 4–33)
AST SERPL-CCNC: 26 U/L — SIGNIFICANT CHANGE UP (ref 4–32)
BASOPHILS # BLD AUTO: 0.04 K/UL — SIGNIFICANT CHANGE UP (ref 0–0.2)
BASOPHILS NFR BLD AUTO: 0.6 % — SIGNIFICANT CHANGE UP (ref 0–2)
BILIRUB SERPL-MCNC: 0.4 MG/DL — SIGNIFICANT CHANGE UP (ref 0.2–1.2)
BUN SERPL-MCNC: 11 MG/DL — SIGNIFICANT CHANGE UP (ref 7–23)
CALCIUM SERPL-MCNC: 8.4 MG/DL — SIGNIFICANT CHANGE UP (ref 8.4–10.5)
CHLORIDE SERPL-SCNC: 105 MMOL/L — SIGNIFICANT CHANGE UP (ref 98–107)
CO2 SERPL-SCNC: 26 MMOL/L — SIGNIFICANT CHANGE UP (ref 22–31)
CREAT SERPL-MCNC: 0.78 MG/DL — SIGNIFICANT CHANGE UP (ref 0.5–1.3)
EOSINOPHIL # BLD AUTO: 0.25 K/UL — SIGNIFICANT CHANGE UP (ref 0–0.5)
EOSINOPHIL NFR BLD AUTO: 3.5 % — SIGNIFICANT CHANGE UP (ref 0–6)
GLUCOSE SERPL-MCNC: 81 MG/DL — SIGNIFICANT CHANGE UP (ref 70–99)
HCT VFR BLD CALC: 35.8 % — SIGNIFICANT CHANGE UP (ref 34.5–45)
HGB BLD-MCNC: 11.2 G/DL — LOW (ref 11.5–15.5)
IMM GRANULOCYTES # BLD AUTO: 0.04 # — SIGNIFICANT CHANGE UP
IMM GRANULOCYTES NFR BLD AUTO: 0.6 % — SIGNIFICANT CHANGE UP (ref 0–1.5)
LYMPHOCYTES # BLD AUTO: 1.55 K/UL — SIGNIFICANT CHANGE UP (ref 1–3.3)
LYMPHOCYTES # BLD AUTO: 21.5 % — SIGNIFICANT CHANGE UP (ref 13–44)
MAGNESIUM SERPL-MCNC: 2.1 MG/DL — SIGNIFICANT CHANGE UP (ref 1.6–2.6)
MCHC RBC-ENTMCNC: 25.1 PG — LOW (ref 27–34)
MCHC RBC-ENTMCNC: 31.3 % — LOW (ref 32–36)
MCV RBC AUTO: 80.1 FL — SIGNIFICANT CHANGE UP (ref 80–100)
MONOCYTES # BLD AUTO: 0.56 K/UL — SIGNIFICANT CHANGE UP (ref 0–0.9)
MONOCYTES NFR BLD AUTO: 7.8 % — SIGNIFICANT CHANGE UP (ref 2–14)
NEUTROPHILS # BLD AUTO: 4.78 K/UL — SIGNIFICANT CHANGE UP (ref 1.8–7.4)
NEUTROPHILS NFR BLD AUTO: 66 % — SIGNIFICANT CHANGE UP (ref 43–77)
NRBC # FLD: 0 — SIGNIFICANT CHANGE UP
PHOSPHATE SERPL-MCNC: 2.7 MG/DL — SIGNIFICANT CHANGE UP (ref 2.5–4.5)
PLATELET # BLD AUTO: 269 K/UL — SIGNIFICANT CHANGE UP (ref 150–400)
PMV BLD: 10 FL — SIGNIFICANT CHANGE UP (ref 7–13)
POTASSIUM SERPL-MCNC: 4.5 MMOL/L — SIGNIFICANT CHANGE UP (ref 3.5–5.3)
POTASSIUM SERPL-SCNC: 4.5 MMOL/L — SIGNIFICANT CHANGE UP (ref 3.5–5.3)
PROT SERPL-MCNC: 7.6 G/DL — SIGNIFICANT CHANGE UP (ref 6–8.3)
RBC # BLD: 4.47 M/UL — SIGNIFICANT CHANGE UP (ref 3.8–5.2)
RBC # FLD: 16.1 % — HIGH (ref 10.3–14.5)
SODIUM SERPL-SCNC: 144 MMOL/L — SIGNIFICANT CHANGE UP (ref 135–145)
WBC # BLD: 7.22 K/UL — SIGNIFICANT CHANGE UP (ref 3.8–10.5)
WBC # FLD AUTO: 7.22 K/UL — SIGNIFICANT CHANGE UP (ref 3.8–10.5)

## 2017-11-23 PROCEDURE — 99222 1ST HOSP IP/OBS MODERATE 55: CPT

## 2017-11-23 RX ADMIN — Medication 10 MILLIGRAM(S): at 20:38

## 2017-11-23 RX ADMIN — Medication 50 MILLIGRAM(S): at 20:38

## 2017-11-23 RX ADMIN — Medication 100 MILLIGRAM(S): at 08:50

## 2017-11-23 RX ADMIN — Medication 325 MILLIGRAM(S): at 00:30

## 2017-11-23 RX ADMIN — Medication 10 MILLIGRAM(S): at 08:50

## 2017-11-23 RX ADMIN — PANTOPRAZOLE SODIUM 40 MILLIGRAM(S): 20 TABLET, DELAYED RELEASE ORAL at 08:50

## 2017-11-23 RX ADMIN — SERTRALINE 25 MILLIGRAM(S): 25 TABLET, FILM COATED ORAL at 08:50

## 2017-11-23 NOTE — CONSULT NOTE ADULT - SUBJECTIVE AND OBJECTIVE BOX
HPI:     PAST MEDICAL & SURGICAL HISTORY:  No pertinent past medical history  Sickle cell trait  History of hepatitis B: &#x27; 90&#x27;s  Pseudotumor (inflammatory) of orbit: Right:  &#x27; .  Treated with prednisone. No surgery  Multiparity  Medial meniscus tear: &#x27;:  Right Knee  Breast cancer: dx: 2013:  Right  No significant past surgical history  History of mastectomy, bilateral  S/P tubal ligation: &#x27;   S/P knee surgery: &#x27; :  Right Knee Arthroscopy  Normal vaginal delivery: &#x27; 91/  &#x27; 92/  &#x27; 99  History of  Section: &#x27;  and &#x27;       Review of Systems:   CONSTITUTIONAL: No fever, weight loss, or fatigue  EYES: No eye pain, visual disturbances, or discharge  ENMT:  No difficulty hearing, tinnitus, vertigo; No sinus or throat pain  NECK: No pain or stiffness  BREASTS: No pain, masses, or nipple discharge  RESPIRATORY: No cough, wheezing, chills or hemoptysis; No shortness of breath  CARDIOVASCULAR: No chest pain, palpitations, dizziness, or leg swelling  GASTROINTESTINAL: No abdominal or epigastric pain. No nausea, vomiting, or hematemesis; No diarrhea or constipation. No melena or hematochezia.  GENITOURINARY: No dysuria, frequency, hematuria, or incontinence  NEUROLOGICAL: No headaches, memory loss, loss of strength, numbness, or tremors  SKIN: No itching, burning, rashes, or lesions   LYMPH NODES: No enlarged glands  ENDOCRINE: No heat or cold intolerance; No hair loss  MUSCULOSKELETAL: No joint pain or swelling; No muscle, back, or extremity pain  PSYCHIATRIC: No depression, anxiety, mood swings, or difficulty sleeping  HEME/LYMPH: No easy bruising, or bleeding gums  ALLERY AND IMMUNOLOGIC: No hives or eczema    Allergies    dust (Sneezing)  No Known Drug Allergies    Intolerances        Social History:     FAMILY HISTORY:      MEDICATIONS  (STANDING):  busPIRone 10 milliGRAM(s) Oral two times a day  pantoprazole    Tablet 40 milliGRAM(s) Oral before breakfast  sertraline 25 milliGRAM(s) Oral daily  thiamine 100 milliGRAM(s) Oral daily  traZODone 50 milliGRAM(s) Oral at bedtime    MEDICATIONS  (PRN):  acetaminophen   Tablet. 325 milliGRAM(s) Oral every 6 hours PRN Moderate Pain (4 - 6)  diphenhydrAMINE   Capsule 50 milliGRAM(s) Oral every 6 hours PRN agitation  diphenhydrAMINE   Injectable 50 milliGRAM(s) IntraMuscular once PRN Combative behavior  haloperidol     Tablet 5 milliGRAM(s) Oral every 6 hours PRN agitation  haloperidol    Injectable 5 milliGRAM(s) IntraMuscular once PRN Combative behavior  hydrOXYzine hydrochloride 50 milliGRAM(s) Oral every 6 hours PRN Anxiety  LORazepam   Injectable 2 milliGRAM(s) IntraMuscular once PRN Combative behavior        CAPILLARY BLOOD GLUCOSE        I&O's Summary      PHYSICAL EXAM:  GENERAL: NAD, well-developed  HEAD:  Atraumatic, Normocephalic  EYES: EOMI, PERRLA, conjunctiva and sclera clear  NECK: Supple, No JVD  CHEST/LUNG: Clear to auscultation bilaterally; No wheeze  HEART: Regular rate and rhythm; No murmurs, rubs, or gallops  ABDOMEN: Soft, Nontender, Nondistended; Bowel sounds present  EXTREMITIES:  2+ Peripheral Pulses, No clubbing, cyanosis, or edema  PSYCH: AAOx3  NEUROLOGY: non-focal  SKIN: No rashes or lesions    LABS:                        11.2   7.22  )-----------( 269      ( 2017 08:32 )             35.8         144  |  105  |  11  ----------------------------<  81  4.5   |  26  |  0.78    Ca    8.4      2017 08:32  Phos  2.7       Mg     2.1         TPro  7.6  /  Alb  4.0  /  TBili  0.4  /  DBili  x   /  AST  26  /  ALT  19  /  AlkPhos  81                EKG:    RADIOLOGY & ADDITIONAL TESTS:    Imaging Personally Reviewed:    Consultant(s) Notes Reviewed:      Care Discussed with Consultants/Other Providers: HPI:  47 year old female with psych disorder now with exacerbation s/p OD with ETOH, Buspar and Celexa admitted to [facility] now transferred to [facility].    +Breast CA  s/p bilateral mastectomies    PAST MEDICAL & SURGICAL HISTORY:  No pertinent past medical history  Sickle cell trait  History of hepatitis B:   Pseudotumor (inflammatory) of orbit: Right  .  Treated with prednisone. No surgery  Multiparity  Medial meniscus tear:   Right Knee  Breast cancer: dx: 2013:  Right  No significant past surgical history  History of mastectomy, bilateral  S/P tubal ligation:   S/P knee surgery:  2002:  Right Knee Arthroscopy  Normal vaginal delivery:  91/  &#x27; 92/  &#x27; 99  History of  Section:   and        Review of Systems:   CONSTITUTIONAL: No fever, weight loss, or fatigue  EYES: No eye pain, visual disturbances, or discharge  ENMT:  No difficulty hearing, tinnitus, vertigo; No sinus or throat pain  NECK: No pain or stiffness  BREASTS: BREAST CA S/P BILATERAL MASTECTOMIES  RESPIRATORY: No cough, wheezing, chills or hemoptysis; No shortness of breath  CARDIOVASCULAR: No chest pain, palpitations, dizziness, or leg swelling  GASTROINTESTINAL: No abdominal or epigastric pain. No nausea, vomiting, or hematemesis; No diarrhea or constipation. No melena or hematochezia.  GENITOURINARY: No dysuria, frequency, hematuria, or incontinence  NEUROLOGICAL: No headaches, memory loss, loss of strength, numbness, or tremors  SKIN: No itching, burning, rashes, or lesions   LYMPH NODES: No enlarged glands  ENDOCRINE: No heat or cold intolerance; No hair loss  MUSCULOSKELETAL: No joint pain or swelling; No muscle, back, or extremity pain  HEME/LYMPH: No easy bruising, or bleeding gums  ALLERY AND IMMUNOLOGIC: No hives or eczema    Allergies    dust (Sneezing)  No Known Drug Allergies    Social History:  -CIGS, -ETOH, -DRUGS    FAMILY HISTORY:  NO SIGNIFICANT MEDICAL HX IN FIRST DEGREE RELATIVES.    MEDICATIONS  (STANDING):  busPIRone 10 milliGRAM(s) Oral two times a day  pantoprazole    Tablet 40 milliGRAM(s) Oral before breakfast  sertraline 25 milliGRAM(s) Oral daily  thiamine 100 milliGRAM(s) Oral daily  traZODone 50 milliGRAM(s) Oral at bedtime    MEDICATIONS  (PRN):  acetaminophen   Tablet. 325 milliGRAM(s) Oral every 6 hours PRN Moderate Pain (4 - 6)  diphenhydrAMINE   Capsule 50 milliGRAM(s) Oral every 6 hours PRN agitation  diphenhydrAMINE   Injectable 50 milliGRAM(s) IntraMuscular once PRN Combative behavior  haloperidol     Tablet 5 milliGRAM(s) Oral every 6 hours PRN agitation  haloperidol    Injectable 5 milliGRAM(s) IntraMuscular once PRN Combative behavior  hydrOXYzine hydrochloride 50 milliGRAM(s) Oral every 6 hours PRN Anxiety  LORazepam   Injectable 2 milliGRAM(s) IntraMuscular once PRN Combative behavior    VS:  98.1  122/81 97 SITTING  120/76 83 STANDING    PHYSICAL EXAM:  GENERAL: NAD, well-developed  HEAD:  Atraumatic, Normocephalic  EYES: EOMI, conjunctiva and sclera clear  NECK: Supple, No JVD  CHEST/LUNG: Clear to auscultation bilaterally; No wheeze  HEART: Regular rate and rhythm; No murmurs, rubs, or gallops  ABDOMEN: Soft, Nontender, Nondistended; Bowel sounds present  EXTREMITIES:   No clubbing, cyanosis, or edema  NEUROLOGY: non-focal  SKIN: No rashes or lesions    LABS:                        11.2   7.22  )-----------( 269      ( 2017 08:32 )             35.8         144  |  105  |  11  ----------------------------<  81  4.5   |  26  |  0.78    Ca    8.4      2017 08:32  Phos  2.7       Mg     2.1         TPro  7.6  /  Alb  4.0  /  TBili  0.4  /  DBili  x   /  AST  26  /  ALT  19  /  AlkPhos  81  2107  TSH 8.21          EKG:      RADIOLOGY & ADDITIONAL TESTS:  2017  CXR CLEAR LUNGS  2017  CT OF HEAD:  NO ACUTE INTRACRANIAL HEMORRHAGE OR CALVARIAL FX.  2017  CT OF SPINE CERVICAL:  NO SPINE FX OF TRAUMATIC MALALIGNMENT.    Consultant(s) Notes Reviewed:  OSIRIS HOSPITALIST NOTES REVIEWED    Care Discussed with Consultants/Other Providers:  ATTENDING AND STAFF HPI:  47 year old female with psych disorder now with exacerbation s/p OD with ETOH, Buspar and Celexa admitted [facility] now transferred to [facility].    +Breast CA  s/p bilateral mastectomies    PAST MEDICAL & SURGICAL HISTORY:  No pertinent past medical history  Sickle cell trait  History of hepatitis B:   Pseudotumor (inflammatory) of orbit: Right  .  Treated with prednisone. No surgery  Multiparity  Medial meniscus tear:   Right Knee  Breast cancer: dx: 2013:  Right  No significant past surgical history  History of mastectomy, bilateral  S/P tubal ligation:   S/P knee surgery:  2002:  Right Knee Arthroscopy  Normal vaginal delivery:  91/  &#x27; 92/  &#x27; 99  History of  Section:   and        Review of Systems:   CONSTITUTIONAL: No fever, weight loss, or fatigue  EYES: No eye pain, visual disturbances, or discharge  ENMT:  No difficulty hearing, tinnitus, vertigo; No sinus or throat pain  NECK: No pain or stiffness  BREASTS: BREAST CA S/P BILATERAL MASTECTOMIES  RESPIRATORY: No cough, wheezing, chills or hemoptysis; No shortness of breath  CARDIOVASCULAR: No chest pain, palpitations, dizziness, or leg swelling  GASTROINTESTINAL: No abdominal or epigastric pain. No nausea, vomiting, or hematemesis; No diarrhea or constipation. No melena or hematochezia.  GENITOURINARY: No dysuria, frequency, hematuria, or incontinence  NEUROLOGICAL: No headaches, memory loss, loss of strength, numbness, or tremors  SKIN: No itching, burning, rashes, or lesions   LYMPH NODES: No enlarged glands  ENDOCRINE: No heat or cold intolerance; No hair loss  MUSCULOSKELETAL: No joint pain or swelling; No muscle, back, or extremity pain  HEME/LYMPH: No easy bruising, or bleeding gums  ALLERY AND IMMUNOLOGIC: No hives or eczema    Allergies    dust (Sneezing)  No Known Drug Allergies    Social History:  -CIGS, -ETOH, -DRUGS    FAMILY HISTORY:  NO SIGNIFICANT MEDICAL HX IN FIRST DEGREE RELATIVES.    MEDICATIONS  (STANDING):  busPIRone 10 milliGRAM(s) Oral two times a day  pantoprazole    Tablet 40 milliGRAM(s) Oral before breakfast  sertraline 25 milliGRAM(s) Oral daily  thiamine 100 milliGRAM(s) Oral daily  traZODone 50 milliGRAM(s) Oral at bedtime    MEDICATIONS  (PRN):  acetaminophen   Tablet. 325 milliGRAM(s) Oral every 6 hours PRN Moderate Pain (4 - 6)  diphenhydrAMINE   Capsule 50 milliGRAM(s) Oral every 6 hours PRN agitation  diphenhydrAMINE   Injectable 50 milliGRAM(s) IntraMuscular once PRN Combative behavior  haloperidol     Tablet 5 milliGRAM(s) Oral every 6 hours PRN agitation  haloperidol    Injectable 5 milliGRAM(s) IntraMuscular once PRN Combative behavior  hydrOXYzine hydrochloride 50 milliGRAM(s) Oral every 6 hours PRN Anxiety  LORazepam   Injectable 2 milliGRAM(s) IntraMuscular once PRN Combative behavior    VS:  98.1  122/81 97 SITTING  120/76 83 STANDING  OXYGEN SAT 98%    PHYSICAL EXAM:  GENERAL: NAD, well-developed  HEAD:  Atraumatic, Normocephalic  EYES: EOMI, conjunctiva and sclera clear  NECK: Supple, No JVD  CHEST/LUNG: Clear to auscultation bilaterally; No wheeze  HEART: Regular rate and rhythm; No murmurs, rubs, or gallops  ABDOMEN: Soft, Nontender, Nondistended; Bowel sounds present  EXTREMITIES:   No clubbing, cyanosis, or edema  NEUROLOGY: non-focal  SKIN: No rashes or lesions    LABS:                        11.2   7.22  )-----------( 269      ( 2017 08:32 )             35.8         144  |  105  |  11  ----------------------------<  81  4.5   |  26  |  0.78    Ca    8.4      2017 08:32  Phos  2.7       Mg     2.1         TPro  7.6  /  Alb  4.0  /  TBili  0.4  /  DBili  x   /  AST  26  /  ALT  19  /  AlkPhos  81  2107  TSH 8.21          EK2017 NSR WTC .410 WNL    RADIOLOGY & ADDITIONAL TESTS:  2017  CXR CLEAR LUNGS  2017  CT OF HEAD:  NO ACUTE INTRACRANIAL HEMORRHAGE OR CALVARIAL FX.  2017  CT OF SPINE CERVICAL:  NO SPINE FX OF TRAUMATIC MALALIGNMENT.    Consultant(s) Notes Reviewed:  OSIRIS HOSPITALIST NOTES REVIEWED    Care Discussed with Consultants/Other Providers:  ATTENDING AND STAFF

## 2017-11-23 NOTE — CONSULT NOTE ADULT - CONSULT REASON
47 year old female with psych disorder now with exacerbation s/p OD with ETOH,  BUSPAR, CELEXA admitted to [facility] now transferred to [facility].  Pt with a hx of Breast CA s/p mastectomy 47 year old female with psych disorder now with exacerbation s/p OD with ETOH,  BUSPAR, CELEXA admitted to [facility] now transferred to [facility].  Pt with a hx of Breast CA s/p  bilateral mastectomy

## 2017-11-23 NOTE — CONSULT NOTE ADULT - ASSESSMENT
47 year old female with psych disorder now with exacerbation s/p OD with ETOH, Buspar and Celexa hospitalized at [facility] now transferred to [facility].  1. S/P OD:  CHECK EKG QTC.  2. SICKLE CELL TRAIT/ANEMIA: EARLIER ADMISSION ANEMIA NOW RESOLVED.  3. BREAST CA 2013 S/P MASTECTOMIES BILATERALLY:  NO RECURRENCE.  4. HX OF HEPATITIS B:  LFT's wnl.  5. PSYCH: AS PER ATTENDING. 47 year old female with psych disorder now with exacerbation s/p OD with ETOH, Buspar and Celexa hospitalized at [facility] now transferred to [facility].  1. S/P OD:  11/24/17  EKG QTC. 410  2. SICKLE CELL TRAIT/ANEMIA: EARLIER ADMISSION ANEMIA NOW RESOLVED.  3. BREAST CA 2013 S/P MASTECTOMIES BILATERALLY:  NO RECURRENCE.  4. HX OF HEPATITIS B:  LFT's wnl.  5. PSYCH: AS PER ATTENDING.

## 2017-11-24 PROCEDURE — 99223 1ST HOSP IP/OBS HIGH 75: CPT

## 2017-11-24 PROCEDURE — 93010 ELECTROCARDIOGRAM REPORT: CPT

## 2017-11-24 PROCEDURE — 99232 SBSQ HOSP IP/OBS MODERATE 35: CPT

## 2017-11-24 RX ORDER — TRAZODONE HCL 50 MG
100 TABLET ORAL AT BEDTIME
Qty: 0 | Refills: 0 | Status: DISCONTINUED | OUTPATIENT
Start: 2017-11-24 | End: 2017-11-29

## 2017-11-24 RX ORDER — SERTRALINE 25 MG/1
50 TABLET, FILM COATED ORAL DAILY
Qty: 0 | Refills: 0 | Status: DISCONTINUED | OUTPATIENT
Start: 2017-11-25 | End: 2017-11-27

## 2017-11-24 RX ADMIN — Medication 10 MILLIGRAM(S): at 21:51

## 2017-11-24 RX ADMIN — Medication 100 MILLIGRAM(S): at 09:16

## 2017-11-24 RX ADMIN — SERTRALINE 25 MILLIGRAM(S): 25 TABLET, FILM COATED ORAL at 09:16

## 2017-11-24 RX ADMIN — Medication 100 MILLIGRAM(S): at 21:51

## 2017-11-24 RX ADMIN — PANTOPRAZOLE SODIUM 40 MILLIGRAM(S): 20 TABLET, DELAYED RELEASE ORAL at 09:16

## 2017-11-24 RX ADMIN — Medication 10 MILLIGRAM(S): at 09:16

## 2017-11-25 LAB
HBV CORE AB SER-ACNC: REACTIVE — SIGNIFICANT CHANGE UP
HBV SURFACE AB SER-ACNC: REACTIVE — SIGNIFICANT CHANGE UP
HBV SURFACE AG SER-ACNC: NEGATIVE — SIGNIFICANT CHANGE UP

## 2017-11-25 PROCEDURE — 99232 SBSQ HOSP IP/OBS MODERATE 35: CPT

## 2017-11-25 RX ADMIN — Medication 10 MILLIGRAM(S): at 21:21

## 2017-11-25 RX ADMIN — Medication 10 MILLIGRAM(S): at 09:04

## 2017-11-25 RX ADMIN — Medication 100 MILLIGRAM(S): at 21:21

## 2017-11-25 RX ADMIN — SERTRALINE 50 MILLIGRAM(S): 25 TABLET, FILM COATED ORAL at 09:04

## 2017-11-25 RX ADMIN — PANTOPRAZOLE SODIUM 40 MILLIGRAM(S): 20 TABLET, DELAYED RELEASE ORAL at 09:04

## 2017-11-25 RX ADMIN — Medication 100 MILLIGRAM(S): at 09:04

## 2017-11-26 PROCEDURE — 99232 SBSQ HOSP IP/OBS MODERATE 35: CPT

## 2017-11-26 RX ADMIN — Medication 10 MILLIGRAM(S): at 22:10

## 2017-11-26 RX ADMIN — Medication 10 MILLIGRAM(S): at 09:30

## 2017-11-26 RX ADMIN — SERTRALINE 50 MILLIGRAM(S): 25 TABLET, FILM COATED ORAL at 09:30

## 2017-11-26 RX ADMIN — PANTOPRAZOLE SODIUM 40 MILLIGRAM(S): 20 TABLET, DELAYED RELEASE ORAL at 09:30

## 2017-11-26 RX ADMIN — Medication 100 MILLIGRAM(S): at 22:10

## 2017-11-27 PROCEDURE — 99232 SBSQ HOSP IP/OBS MODERATE 35: CPT | Mod: 25

## 2017-11-27 PROCEDURE — 90853 GROUP PSYCHOTHERAPY: CPT

## 2017-11-27 RX ORDER — SERTRALINE 25 MG/1
75 TABLET, FILM COATED ORAL DAILY
Qty: 0 | Refills: 0 | Status: DISCONTINUED | OUTPATIENT
Start: 2017-11-28 | End: 2017-11-28

## 2017-11-27 RX ADMIN — Medication 10 MILLIGRAM(S): at 23:19

## 2017-11-27 RX ADMIN — SERTRALINE 50 MILLIGRAM(S): 25 TABLET, FILM COATED ORAL at 08:53

## 2017-11-27 RX ADMIN — Medication 100 MILLIGRAM(S): at 23:19

## 2017-11-27 RX ADMIN — PANTOPRAZOLE SODIUM 40 MILLIGRAM(S): 20 TABLET, DELAYED RELEASE ORAL at 08:53

## 2017-11-27 RX ADMIN — Medication 10 MILLIGRAM(S): at 08:53

## 2017-11-28 DIAGNOSIS — F33.1 MAJOR DEPRESSIVE DISORDER, RECURRENT, MODERATE: ICD-10-CM

## 2017-11-28 DIAGNOSIS — T43.222A POISONING BY SELECTIVE SEROTONIN REUPTAKE INHIBITORS, INTENTIONAL SELF-HARM, INITIAL ENCOUNTER: ICD-10-CM

## 2017-11-28 DIAGNOSIS — Z90.13 ACQUIRED ABSENCE OF BILATERAL BREASTS AND NIPPLES: ICD-10-CM

## 2017-11-28 DIAGNOSIS — E87.0 HYPEROSMOLALITY AND HYPERNATREMIA: ICD-10-CM

## 2017-11-28 DIAGNOSIS — F10.120 ALCOHOL ABUSE WITH INTOXICATION, UNCOMPLICATED: ICD-10-CM

## 2017-11-28 DIAGNOSIS — Z85.3 PERSONAL HISTORY OF MALIGNANT NEOPLASM OF BREAST: ICD-10-CM

## 2017-11-28 DIAGNOSIS — F41.9 ANXIETY DISORDER, UNSPECIFIED: ICD-10-CM
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
## 2017-11-28 DIAGNOSIS — G93.40 ENCEPHALOPATHY, UNSPECIFIED: ICD-10-CM

## 2017-11-28 PROCEDURE — 99232 SBSQ HOSP IP/OBS MODERATE 35: CPT | Mod: 25

## 2017-11-28 PROCEDURE — 90853 GROUP PSYCHOTHERAPY: CPT

## 2017-11-28 RX ORDER — SERTRALINE 25 MG/1
100 TABLET, FILM COATED ORAL DAILY
Qty: 0 | Refills: 0 | Status: DISCONTINUED | OUTPATIENT
Start: 2017-11-29 | End: 2017-11-29

## 2017-11-28 RX ADMIN — SERTRALINE 75 MILLIGRAM(S): 25 TABLET, FILM COATED ORAL at 09:46

## 2017-11-28 RX ADMIN — PANTOPRAZOLE SODIUM 40 MILLIGRAM(S): 20 TABLET, DELAYED RELEASE ORAL at 09:03

## 2017-11-28 RX ADMIN — Medication 10 MILLIGRAM(S): at 21:55

## 2017-11-28 RX ADMIN — Medication 10 MILLIGRAM(S): at 09:46

## 2017-11-28 RX ADMIN — Medication 100 MILLIGRAM(S): at 21:55

## 2017-11-29 ENCOUNTER — APPOINTMENT (OUTPATIENT)
Dept: OPHTHALMOLOGY | Facility: CLINIC | Age: 47
End: 2017-11-29

## 2017-11-29 PROCEDURE — 99232 SBSQ HOSP IP/OBS MODERATE 35: CPT

## 2017-11-29 RX ORDER — PANTOPRAZOLE SODIUM 20 MG/1
1 TABLET, DELAYED RELEASE ORAL
Qty: 0 | Refills: 0 | COMMUNITY

## 2017-11-29 RX ORDER — TRAZODONE HCL 50 MG
1 TABLET ORAL
Qty: 15 | Refills: 0
Start: 2017-11-29 | End: 2017-12-14

## 2017-11-29 RX ORDER — PANTOPRAZOLE SODIUM 20 MG/1
1 TABLET, DELAYED RELEASE ORAL
Qty: 15 | Refills: 0
Start: 2017-11-29 | End: 2017-12-14

## 2017-11-29 RX ORDER — SERTRALINE 25 MG/1
1 TABLET, FILM COATED ORAL
Qty: 15 | Refills: 0
Start: 2017-11-29 | End: 2017-12-14

## 2017-11-29 RX ADMIN — Medication 10 MILLIGRAM(S): at 10:19

## 2017-11-29 RX ADMIN — PANTOPRAZOLE SODIUM 40 MILLIGRAM(S): 20 TABLET, DELAYED RELEASE ORAL at 10:19

## 2017-11-29 RX ADMIN — SERTRALINE 100 MILLIGRAM(S): 25 TABLET, FILM COATED ORAL at 10:19

## 2017-11-30 VITALS — RESPIRATION RATE: 18 BRPM | TEMPERATURE: 99 F

## 2017-11-30 RX ADMIN — Medication 10 MILLIGRAM(S): at 09:20

## 2017-11-30 RX ADMIN — PANTOPRAZOLE SODIUM 40 MILLIGRAM(S): 20 TABLET, DELAYED RELEASE ORAL at 09:20

## 2017-11-30 RX ADMIN — SERTRALINE 100 MILLIGRAM(S): 25 TABLET, FILM COATED ORAL at 09:20

## 2017-12-04 ENCOUNTER — OUTPATIENT (OUTPATIENT)
Dept: OUTPATIENT SERVICES | Facility: HOSPITAL | Age: 47
LOS: 1 days | Discharge: HOME | End: 2017-12-04

## 2017-12-04 DIAGNOSIS — Z90.13 ACQUIRED ABSENCE OF BILATERAL BREASTS AND NIPPLES: Chronic | ICD-10-CM

## 2017-12-06 DIAGNOSIS — F10.10 ALCOHOL ABUSE, UNCOMPLICATED: ICD-10-CM

## 2018-01-17 ENCOUNTER — APPOINTMENT (OUTPATIENT)
Dept: OPHTHALMOLOGY | Facility: CLINIC | Age: 48
End: 2018-01-17
Payer: MEDICAID

## 2018-01-17 DIAGNOSIS — H35.89 OTHER SPECIFIED RETINAL DISORDERS: ICD-10-CM

## 2018-01-17 DIAGNOSIS — H04.123 DRY EYE SYNDROME OF BILATERAL LACRIMAL GLANDS: ICD-10-CM

## 2018-01-17 DIAGNOSIS — H25.13 AGE-RELATED NUCLEAR CATARACT, BILATERAL: ICD-10-CM

## 2018-01-17 PROCEDURE — 92134 CPTRZ OPH DX IMG PST SGM RTA: CPT

## 2018-01-17 PROCEDURE — 92004 COMPRE OPH EXAM NEW PT 1/>: CPT

## 2018-01-22 PROBLEM — H25.13 AGE-RELATED NUCLEAR CATARACT OF BOTH EYES: Status: ACTIVE | Noted: 2018-01-22

## 2018-01-22 PROBLEM — H35.89 MACULAR RPE MOTTLING: Status: ACTIVE | Noted: 2018-01-22

## 2018-01-22 PROBLEM — H04.123 DRY EYE SYNDROME, BILATERAL: Status: ACTIVE | Noted: 2018-01-22

## 2018-02-09 NOTE — ED ADULT TRIAGE NOTE - SOURCE OF INFORMATION
2/8/2018    CHIEF COMPLAINT:    Chief Complaint   Patient presents with   â¢ Physical       HISTORY OF PRESENT ILLNESS:  Bhavna Osman presents for her routine physical exam.  She has concerns regarding needing a pap. 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   â¢ Abdominal pain, right lower quadrant   â¢ Morbid obesity with BMI of 40.0-44.9, adult (CMS/Formerly [facility])       Past Medical History:   Diagnosis Date   â¢ JACQUELYN III - carcinoma in situ of cervix    â¢ Umbilical hernia        Past Surgical History:   Procedure Laterality Date   â¢ Hysterectomy  12/29/2011    lap CERVICAL CARCINOMA IN SITU   â¢ Tubal ligation  4/2010       Family History   Problem Relation Age of Onset   â¢ Diabetes Mother    â¢ Cancer, Breast Mother 39       SOCIAL HISTORY:  Social History   Substance Use Topics   â¢ Smoking status: Never Smoker   â¢ Smokeless tobacco: Never Used   â¢ Alcohol use Yes      Comment: once per month       ALLERGIES:  No Known Allergies    Current Outpatient Prescriptions   Medication   â¢ betamethasone valerate (VALISONE) 0.1 % cream     No current facility-administered medications for this visit. ROS: no vaginal discharge, no abdominal pain, no dysuria, joint pain. Swelling, shortness of breath, chest pain, trouble hearing or vision problems - eye exam was recommended as she cannt remember the last time she had an eye exam    PHYSICAL EXAM:    VITAL SIGNS:    Visit Vitals  /70   Wt 116.1 kg   LMP 12/20/2011   BMI 40.10 kg/mÂ²     GENERAL APPEARANCE:  Healthy. SKIN:  Skin color, texture, turgor are normal. There are no bruises, or lesions.  Multiple tattoos, pt has thickened, hyperpigmented slightly hypertrophic skin on both elbows and reports eczema on arms and face but not currently  HEAD:  normocephalic. No masses, lesions, tenderness or abnormalities  EARS:  External ears normal. Canals clear. Tympanic membranes are clear with all landmarks noted. EYES:  PERRL, EOMI, sclera white and nonicteric, no conjunctival erythema, exudate or swelling; normal lids  NOSE:  normal  MOUTH:  mucosa pink and moist.  THROAT:  Lips, mucosa, and tongue normal. Teeth and gums normal. Oropharynx clear  NECK:  symmetric, supple, without adenopathy and thyroid normal size, non-tender,  without nodularity  CHEST:  symmetrical, no chest deformities noted and no chest wall tenderness  BACK:  straight, symmetric and nontender to palpation  LUNGS:  clear to auscultation  BREASTS:  breasts symmetric, no dominant or suspicious mass, no skin or nipple changes, no axillary adenopathy and self exam is taught and encouraged  HEART:  regular rate and rhythm and no murmurs, clicks, or gallops  ABDOMEN:  soft, non-tender, bowel sounds normal, no masses, hepatomegaly or splenomegaly noted  RECTAL:  deferred asymptomatic   PELVIC EXAM:  normal external female genitalia, Speculum exam reveals a healthy appearing vaginal vault and surgically absent cervix., Pap is obtained. of the vagina from multiple areas, bimanual exam reveals cervix and uterus surgically absent, ovaries nonpalpable and no adnexal masses or tenderness. EXTREMITIES:  no clubbing, cyanosis, edema or deformity noted  NEUROLOGIC:  grossly normal     ASSESSMENT:   1)  Healthy 32year old female. 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   4) eczema - using betamethasone valerate - discussed using milder steroid and pt notes that with hydrocortisone she gets hypopigmentation and with this one she does not    PLAN:  follow up in 1 year for next  physical exam, pap obtained and lipid ordered         See orders. Patient

## 2018-03-12 ENCOUNTER — EMERGENCY (EMERGENCY)
Facility: HOSPITAL | Age: 48
LOS: 1 days | Discharge: HOME | End: 2018-03-12
Attending: EMERGENCY MEDICINE | Admitting: EMERGENCY MEDICINE
Payer: MEDICAID

## 2018-03-12 VITALS
HEART RATE: 74 BPM | TEMPERATURE: 98 F | OXYGEN SATURATION: 100 % | DIASTOLIC BLOOD PRESSURE: 75 MMHG | RESPIRATION RATE: 16 BRPM | SYSTOLIC BLOOD PRESSURE: 120 MMHG

## 2018-03-12 DIAGNOSIS — Z90.13 ACQUIRED ABSENCE OF BILATERAL BREASTS AND NIPPLES: Chronic | ICD-10-CM

## 2018-03-12 PROCEDURE — 99283 EMERGENCY DEPT VISIT LOW MDM: CPT

## 2018-03-12 PROCEDURE — 73562 X-RAY EXAM OF KNEE 3: CPT | Mod: 26,RT

## 2018-03-12 NOTE — ED PROVIDER NOTE - ATTENDING CONTRIBUTION TO CARE
I performed the initial face to face bedside interview with this patient regarding history of present illness, review of symptoms and past medical, social and family history.  I completed an independent physical examination.  I was the initial provider who evaluated this patient.  The history, review of symptoms and examination was documented by the scribe in my presence and I attest to the accuracy of the documentation.  I have signed out the follow up of any pending tests (i.e. labs, radiological studies) to the PA.  I have discussed the patient’s plan of care and disposition with the PA.  -DEEDEE James, DO

## 2018-03-12 NOTE — ED PROVIDER NOTE - PMH
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 No surgery  Sickle cell trait

## 2018-03-12 NOTE — ED PROVIDER NOTE - CARE PLAN
Principal Discharge DX:	Knee pain  Assessment and plan of treatment:	Follow up with an orthopedic doctor within 1 week, referral list provided  Follow up with your primary care provider within 48 hours  Wear knee immobilizer until further evaluation  Take Motrin 600mg every 8 hours for pain, take with food  Rest, ice and elevate the knee  Return to the emergency department with any worsening or concerning symptoms, increased pain, inability to bear weight, swelling or any other concerns.

## 2018-03-12 NOTE — ED PROCEDURE NOTE - CPROC ED POST PROC CARE GUIDE1
Instructed patient/caregiver regarding signs and symptoms of infection./Elevate the injured extremity as instructed./Verbal/written post procedure instructions were given to patient/caregiver./Keep the cast/splint/dressing clean and dry./Instructed patient/caregiver to follow-up with primary care physician.

## 2018-03-12 NOTE — ED PROVIDER NOTE - LOWER EXTREMITY EXAM, RIGHT
suprapatellar tenderness, medial joint line tenderness, mild joint effusion, strong pedal pulse, sensation intact, strength 5/5

## 2018-03-12 NOTE — ED PROVIDER NOTE - PSH
History of  Section  '  and 2007  History of mastectomy, bilateral    No significant past surgical history    Normal vaginal delivery  /  /    S/P knee surgery  ' :  Right Knee Arthroscopy  S/P tubal ligation  2007

## 2018-03-12 NOTE — ED PROVIDER NOTE - MEDICAL DECISION MAKING DETAILS
46 y/o F w/ PMHx of anxiety and right knee tibial plateau repair, presents to the ED w/ right knee pain x3 weeks, worse w/ ambulation. Denies injury. Signs and sx suspicious for meniscal injury or ligamentous sprain. Plan: XR, knee immobilizer and ortho referral.

## 2018-03-12 NOTE — ED PROVIDER NOTE - PLAN OF CARE
Follow up with an orthopedic doctor within 1 week, referral list provided  Follow up with your primary care provider within 48 hours  Wear knee immobilizer until further evaluation  Take Motrin 600mg every 8 hours for pain, take with food  Rest, ice and elevate the knee  Return to the emergency department with any worsening or concerning symptoms, increased pain, inability to bear weight, swelling or any other concerns.

## 2018-03-12 NOTE — ED PROVIDER NOTE - OBJECTIVE STATEMENT
46 y/o F w/ PMhx of anxiety, presents to the ED c/o right knee pain x3 weeks. Pain is worse w/ walking. Endorses use of Motrin w/ no relief. Pt reports hx of right tibial plateau fracture in 2001. Pt is currently ambulatory. Denies fall, trauma, numbness/tingling, weakness or any other complaints.

## 2018-03-12 NOTE — ED PROCEDURE NOTE - NS ED PERI VASCULAR NEG
capillary refill time < 2 seconds/no cyanosis of extremity/no paresthesia/fingers/toes warm to touch/no swelling

## 2018-03-12 NOTE — ED PROVIDER NOTE - PROGRESS NOTE DETAILS
MINOR Rios: Discussed case with QUID attending : 47yF w/ pmhx tibial plateau fx presenting w/ 3 weeks of knee pain. Xray with no fracture or effusion. Will d/c home with knee immobilizer and ortho f/u

## 2018-03-12 NOTE — ED ADULT TRIAGE NOTE - CHIEF COMPLAINT QUOTE
Pt c/o R knee pain, swelling and difficulty ambulating x 3 weeks.  Pt with hx fracture knee," with a pin"

## 2018-03-14 ENCOUNTER — APPOINTMENT (OUTPATIENT)
Dept: ORTHOPEDIC SURGERY | Facility: CLINIC | Age: 48
End: 2018-03-14
Payer: MEDICAID

## 2018-03-14 VITALS — HEIGHT: 63 IN | WEIGHT: 140 LBS | BODY MASS INDEX: 24.8 KG/M2

## 2018-03-14 VITALS — SYSTOLIC BLOOD PRESSURE: 111 MMHG | DIASTOLIC BLOOD PRESSURE: 74 MMHG | HEART RATE: 84 BPM

## 2018-03-14 DIAGNOSIS — M25.561 PAIN IN RIGHT KNEE: ICD-10-CM

## 2018-03-14 PROCEDURE — 99203 OFFICE O/P NEW LOW 30 MIN: CPT

## 2018-03-15 ENCOUNTER — OTHER (OUTPATIENT)
Age: 48
End: 2018-03-15

## 2018-03-16 ENCOUNTER — APPOINTMENT (OUTPATIENT)
Dept: MRI IMAGING | Facility: CLINIC | Age: 48
End: 2018-03-16

## 2018-03-30 ENCOUNTER — APPOINTMENT (OUTPATIENT)
Dept: MRI IMAGING | Facility: CLINIC | Age: 48
End: 2018-03-30

## 2018-04-01 ENCOUNTER — OUTPATIENT (OUTPATIENT)
Dept: OUTPATIENT SERVICES | Facility: HOSPITAL | Age: 48
LOS: 1 days | End: 2018-04-01
Payer: MEDICAID

## 2018-04-01 DIAGNOSIS — Z90.13 ACQUIRED ABSENCE OF BILATERAL BREASTS AND NIPPLES: Chronic | ICD-10-CM

## 2018-04-01 PROCEDURE — G9001: CPT

## 2018-04-03 ENCOUNTER — APPOINTMENT (OUTPATIENT)
Dept: ORTHOPEDIC SURGERY | Facility: CLINIC | Age: 48
End: 2018-04-03

## 2018-04-13 ENCOUNTER — EMERGENCY (EMERGENCY)
Facility: HOSPITAL | Age: 48
LOS: 1 days | Discharge: HOME | End: 2018-04-13
Attending: EMERGENCY MEDICINE | Admitting: EMERGENCY MEDICINE
Payer: MEDICAID

## 2018-04-13 VITALS
OXYGEN SATURATION: 99 % | DIASTOLIC BLOOD PRESSURE: 66 MMHG | TEMPERATURE: 98 F | SYSTOLIC BLOOD PRESSURE: 108 MMHG | RESPIRATION RATE: 16 BRPM | HEART RATE: 93 BPM

## 2018-04-13 DIAGNOSIS — Z90.13 ACQUIRED ABSENCE OF BILATERAL BREASTS AND NIPPLES: Chronic | ICD-10-CM

## 2018-04-13 PROCEDURE — 99282 EMERGENCY DEPT VISIT SF MDM: CPT

## 2018-04-13 PROCEDURE — 73564 X-RAY EXAM KNEE 4 OR MORE: CPT | Mod: 26,RT

## 2018-04-13 NOTE — ED PROVIDER NOTE - OBJECTIVE STATEMENT
46 y/o F pt with PMHx of Anxiety, Breast CA, Medial Meniscus tear, Pseudotumor (inflammatory) of orbit, Hepatitis B, and Sickle Cell trait, and PSHx of Knee surgery (in ), , Mastectomy, and Tubal ligation, arrives to the ED c/o worsening right knee pain/swelling and difficulty bearing weight s/p doing squats 2 months ago. Pt reports that she had a Tibial Plateau fx in , in her right knee, leading her to have knee surgery. Pt notes that she has had a XR (negative results), and is scheduled for a MRI. Motrin to no relief. Pt drove here today. Denies chance of pregnancy. Denies trauma or any other complaints. NKDA.

## 2018-04-13 NOTE — ED PROVIDER NOTE - LOWER EXTREMITY EXAM, RIGHT
old scars to the right knee. No obvious deformity. Nml ROM of right knee; can't bend fully but can almost bend fully (baseline)

## 2018-04-13 NOTE — ED ADULT TRIAGE NOTE - CHIEF COMPLAINT QUOTE
Pt c/o pain and swelling to right knee x 2 months, states she was doing squats prior to onset. Pt states she also sometimes feels like the knee is not in place. Reports history of fracture to right knee in 2000

## 2018-04-13 NOTE — ED PROVIDER NOTE - PLAN OF CARE
Follow up with ortho this week.   Rest, ice and elevate knee.  Ambulate as tolerated with use of crutches.  Keep knee immobilizer on during the day.  Take Motrin 600mg every 8 hrs for pain with food. Worsening pain, swelling, weakness, numbness return to ER

## 2018-04-13 NOTE — ED PROVIDER NOTE - CARE PLAN
Principal Discharge DX:	Knee pain  Assessment and plan of treatment:	Follow up with ortho this week.   Rest, ice and elevate knee.  Ambulate as tolerated with use of crutches.  Keep knee immobilizer on during the day.  Take Motrin 600mg every 8 hrs for pain with food. Worsening pain, swelling, weakness, numbness return to ER

## 2018-04-13 NOTE — ED PROVIDER NOTE - PROGRESS NOTE DETAILS
MINOR oSw: Received signout and discussed plan with QUID attending. Xrays negative for acute change, hardware unchange. pain well controlled, as patient did not need anything for pain during ED stay. Knee immobilizer in place Stable for d/c home with followup. Pt amenable with plan. MINOR Sow: Received signout and discussed plan with QUID attending. Xrays negative for acute change, hardware unchange. pain well controlled, as patient did not need anything for pain during ED stay. Knee immobilizer in place. pt wanted to use can for ambulation assistance, crutches did not work in previously. pt ambulating well with the cane. Stable for d/c home with followup. Pt amenable with plan.

## 2018-04-16 DIAGNOSIS — R69 ILLNESS, UNSPECIFIED: ICD-10-CM

## 2018-05-19 ENCOUNTER — EMERGENCY (EMERGENCY)
Facility: HOSPITAL | Age: 48
LOS: 1 days | Discharge: HOME | End: 2018-05-19
Attending: EMERGENCY MEDICINE | Admitting: EMERGENCY MEDICINE
Payer: MEDICAID

## 2018-05-19 VITALS
OXYGEN SATURATION: 100 % | HEART RATE: 98 BPM | SYSTOLIC BLOOD PRESSURE: 141 MMHG | RESPIRATION RATE: 18 BRPM | DIASTOLIC BLOOD PRESSURE: 60 MMHG | TEMPERATURE: 98 F

## 2018-05-19 VITALS
TEMPERATURE: 98 F | RESPIRATION RATE: 18 BRPM | DIASTOLIC BLOOD PRESSURE: 80 MMHG | SYSTOLIC BLOOD PRESSURE: 117 MMHG | OXYGEN SATURATION: 100 % | HEART RATE: 93 BPM

## 2018-05-19 DIAGNOSIS — Z90.13 ACQUIRED ABSENCE OF BILATERAL BREASTS AND NIPPLES: Chronic | ICD-10-CM

## 2018-05-19 PROCEDURE — 99284 EMERGENCY DEPT VISIT MOD MDM: CPT | Mod: 25

## 2018-05-19 PROCEDURE — 71046 X-RAY EXAM CHEST 2 VIEWS: CPT | Mod: 26

## 2018-05-19 RX ORDER — BENZOCAINE AND MENTHOL 5; 1 G/100ML; G/100ML
1 LIQUID ORAL ONCE
Qty: 0 | Refills: 0 | Status: COMPLETED | OUTPATIENT
Start: 2018-05-19 | End: 2018-05-19

## 2018-05-19 RX ORDER — OLOPATADINE HYDROCHLORIDE 665 UG/1
2 SPRAY, METERED NASAL
Qty: 1 | Refills: 0
Start: 2018-05-19 | End: 2018-05-25

## 2018-05-19 RX ORDER — ALBUTEROL 90 UG/1
2.5 AEROSOL, METERED ORAL ONCE
Qty: 0 | Refills: 0 | Status: COMPLETED | OUTPATIENT
Start: 2018-05-19 | End: 2018-05-19

## 2018-05-19 RX ADMIN — BENZOCAINE AND MENTHOL 1 LOZENGE: 5; 1 LIQUID ORAL at 08:01

## 2018-05-19 RX ADMIN — Medication 100 MILLIGRAM(S): at 08:02

## 2018-05-19 RX ADMIN — ALBUTEROL 2.5 MILLIGRAM(S): 90 AEROSOL, METERED ORAL at 08:02

## 2018-05-19 NOTE — ED PROVIDER NOTE - OBJECTIVE STATEMENT
46yo F p/w chronic cough x 1 month. Reports initially had dry cough, thought it was related to her allergies, tried robitussin, benadryl, zyrtec, dayquil and nyquil without relief. Improved mildly but worsened today, started having productive cough with yellow sputum. Endorses nasal congestion and sore throat. Denies fevers, chills, CP, SOB, myalgias. Denies sick contacts or recent travel history. Denies cigarette use.

## 2018-05-19 NOTE — ED PROVIDER NOTE - MEDICAL DECISION MAKING DETAILS
Chronic cough x 1 month likely post-nasal drip. Not toxic appearing, hemodynamically stable. Given pattern of cough improving and then worsening will obtain CXR to r/o PNA. Trial of tessalon perles

## 2018-05-19 NOTE — ED PROVIDER NOTE - PROGRESS NOTE DETAILS
Continues to have nasal congestion but cough mildly improved Continues to have nasal congestion but cough mildly improved. CXR - no focal consolidation. Will give 3 day course of hycodan for cough (ISTOP: Reference #: [id number])

## 2018-05-19 NOTE — ED PROVIDER NOTE - ATTENDING CONTRIBUTION TO CARE
46 y/o otherwise healthy F with dry cough.  Pt reports sxs started 1 month ago, initially improved but worsening again for the past week.  Associated with nasal congestion.  No fever, chills, sob, cp, leg pain or swelling.  Pt has been taking otc medications without any relief, recently started on allergy medication by PMD.  Had outpt CXR 2 weeks prior that was reported normal.  No recent travel, known sick contacts, or recent hospitalizations.  Well appearing, sitting comfortably in stretcher, awake and alert, nontoxic noted frequent dry cough.  AF/VSS.  Post oropharynx red, cobblestoning, no exudates.  Pt is speaking in full sentences and has no increased work of breathing.  Lungs cta bl.  Cards nl S1/S2, RRR, no MRG.  Abd soft ntnd.  No pedal edema or calf tenderness.  Given duration, will obtain CXR r/o underlying pna; sxs likely viral vs allergic in etiology, e/o postnasal drip with irritation and cobblestoning on exam, valencia treat symptomatically and plan for otupatient follow-up.

## 2018-05-19 NOTE — ED ADULT NURSE NOTE - OBJECTIVE STATEMENT
Pt. received into room # 19 with c/o cough and cold x 3 weeks 2/2 to allergies.  meds given as ordered. no acute distress noted at this time.

## 2018-05-19 NOTE — ED PROVIDER NOTE - CARE PLAN
Principal Discharge DX:	Post-nasal drip  Assessment and plan of treatment:	Take hycodan and spray olopatadine as directed. Continue to use flonase, mucinex, and zyrtec. Follow up with your primary care doctor within 2 days of discharge.

## 2018-05-19 NOTE — ED PROVIDER NOTE - PLAN OF CARE
Take hycodan and spray olopatadine as directed. Continue to use flonase, mucinex, and zyrtec. Follow up with your primary care doctor within 2 days of discharge.

## 2018-05-19 NOTE — ED PROVIDER NOTE - ENMT, MLM
Airway patent, Mouth with normal mucosa. Throat has no vesicles, no oropharyngeal exudates and uvula is midline. + Nasal congestion

## 2018-06-21 ENCOUNTER — OUTPATIENT (OUTPATIENT)
Dept: OUTPATIENT SERVICES | Facility: HOSPITAL | Age: 48
LOS: 1 days | End: 2018-06-21
Payer: MEDICAID

## 2018-06-21 ENCOUNTER — APPOINTMENT (OUTPATIENT)
Dept: MRI IMAGING | Facility: CLINIC | Age: 48
End: 2018-06-21
Payer: MEDICAID

## 2018-06-21 DIAGNOSIS — Z90.13 ACQUIRED ABSENCE OF BILATERAL BREASTS AND NIPPLES: Chronic | ICD-10-CM

## 2018-06-21 DIAGNOSIS — M25.561 PAIN IN RIGHT KNEE: ICD-10-CM

## 2018-06-21 PROCEDURE — 73721 MRI JNT OF LWR EXTRE W/O DYE: CPT | Mod: 26,RT

## 2018-06-21 PROCEDURE — 73721 MRI JNT OF LWR EXTRE W/O DYE: CPT

## 2018-07-03 ENCOUNTER — APPOINTMENT (OUTPATIENT)
Dept: ORTHOPEDIC SURGERY | Facility: CLINIC | Age: 48
End: 2018-07-03
Payer: MEDICAID

## 2018-07-03 VITALS
WEIGHT: 140 LBS | HEART RATE: 94 BPM | SYSTOLIC BLOOD PRESSURE: 110 MMHG | DIASTOLIC BLOOD PRESSURE: 74 MMHG | HEIGHT: 63 IN | BODY MASS INDEX: 24.8 KG/M2

## 2018-07-03 PROCEDURE — 73564 X-RAY EXAM KNEE 4 OR MORE: CPT | Mod: RT

## 2018-07-03 PROCEDURE — 99215 OFFICE O/P EST HI 40 MIN: CPT

## 2018-08-06 ENCOUNTER — APPOINTMENT (OUTPATIENT)
Dept: ORTHOPEDIC SURGERY | Facility: CLINIC | Age: 48
End: 2018-08-06
Payer: MEDICAID

## 2018-08-06 VITALS — SYSTOLIC BLOOD PRESSURE: 115 MMHG | HEART RATE: 88 BPM | DIASTOLIC BLOOD PRESSURE: 77 MMHG

## 2018-08-06 DIAGNOSIS — M17.11 UNILATERAL PRIMARY OSTEOARTHRITIS, RIGHT KNEE: ICD-10-CM

## 2018-08-06 PROCEDURE — 99213 OFFICE O/P EST LOW 20 MIN: CPT

## 2018-09-05 ENCOUNTER — APPOINTMENT (OUTPATIENT)
Dept: OPHTHALMOLOGY | Facility: CLINIC | Age: 48
End: 2018-09-05
Payer: MEDICAID

## 2018-09-05 DIAGNOSIS — H05.111: ICD-10-CM

## 2018-09-05 PROBLEM — F41.9 ANXIETY DISORDER, UNSPECIFIED: Chronic | Status: ACTIVE | Noted: 2018-03-12

## 2018-09-05 PROCEDURE — 92014 COMPRE OPH EXAM EST PT 1/>: CPT

## 2018-09-05 RX ORDER — PREDNISOLONE ACETATE 10 MG/ML
1 SUSPENSION/ DROPS OPHTHALMIC 4 TIMES DAILY
Qty: 1 | Refills: 1 | Status: ACTIVE | COMMUNITY
Start: 2018-09-05 | End: 1900-01-01

## 2018-09-07 ENCOUNTER — APPOINTMENT (OUTPATIENT)
Dept: OPHTHALMOLOGY | Facility: CLINIC | Age: 48
End: 2018-09-07
Payer: MEDICAID

## 2018-09-07 DIAGNOSIS — Z78.9 OTHER SPECIFIED HEALTH STATUS: ICD-10-CM

## 2018-09-07 DIAGNOSIS — Z85.3 PERSONAL HISTORY OF MALIGNANT NEOPLASM OF BREAST: ICD-10-CM

## 2018-09-07 DIAGNOSIS — Z83.518 FAMILY HISTORY OF OTHER SPECIFIED EYE DISORDER: ICD-10-CM

## 2018-09-07 DIAGNOSIS — Z86.59 PERSONAL HISTORY OF OTHER MENTAL AND BEHAVIORAL DISORDERS: ICD-10-CM

## 2018-09-07 PROCEDURE — 92012 INTRM OPH EXAM EST PATIENT: CPT

## 2018-09-07 RX ORDER — PREDNISONE 5 MG/1
5 TABLET ORAL
Qty: 60 | Refills: 2 | Status: ACTIVE | COMMUNITY
Start: 2018-09-07 | End: 1900-01-01

## 2018-09-07 RX ORDER — IBUPROFEN 800 MG/1
TABLET, FILM COATED ORAL
Refills: 0 | Status: ACTIVE | COMMUNITY

## 2018-09-07 RX ORDER — TRAZODONE HYDROCHLORIDE 150 MG/1
150 TABLET ORAL
Refills: 0 | Status: ACTIVE | COMMUNITY

## 2018-09-07 RX ORDER — SERTRALINE HYDROCHLORIDE 25 MG/1
25 TABLET, FILM COATED ORAL
Refills: 0 | Status: ACTIVE | COMMUNITY

## 2018-09-07 RX ORDER — NAPROXEN SODIUM 220 MG
TABLET ORAL
Refills: 0 | Status: ACTIVE | COMMUNITY

## 2018-09-07 RX ORDER — IBUPROFEN 200 MG/1
200 TABLET, FILM COATED ORAL
Refills: 0 | Status: ACTIVE | COMMUNITY

## 2018-09-22 ENCOUNTER — EMERGENCY (EMERGENCY)
Facility: HOSPITAL | Age: 48
LOS: 1 days | Discharge: HOME | End: 2018-09-22
Attending: EMERGENCY MEDICINE | Admitting: EMERGENCY MEDICINE
Payer: MEDICAID

## 2018-09-22 VITALS
HEART RATE: 97 BPM | RESPIRATION RATE: 18 BRPM | DIASTOLIC BLOOD PRESSURE: 60 MMHG | OXYGEN SATURATION: 100 % | SYSTOLIC BLOOD PRESSURE: 110 MMHG | TEMPERATURE: 98 F

## 2018-09-22 VITALS
RESPIRATION RATE: 18 BRPM | DIASTOLIC BLOOD PRESSURE: 81 MMHG | OXYGEN SATURATION: 99 % | HEART RATE: 101 BPM | SYSTOLIC BLOOD PRESSURE: 128 MMHG | TEMPERATURE: 98 F

## 2018-09-22 DIAGNOSIS — Z90.13 ACQUIRED ABSENCE OF BILATERAL BREASTS AND NIPPLES: Chronic | ICD-10-CM

## 2018-09-22 LAB
ALBUMIN SERPL ELPH-MCNC: 3.8 G/DL — SIGNIFICANT CHANGE UP (ref 3.3–5)
ALP SERPL-CCNC: 76 U/L — SIGNIFICANT CHANGE UP (ref 40–120)
ALT FLD-CCNC: 24 U/L — SIGNIFICANT CHANGE UP (ref 4–33)
APAP SERPL-MCNC: < 15 UG/ML — LOW (ref 15–25)
AST SERPL-CCNC: 20 U/L — SIGNIFICANT CHANGE UP (ref 4–32)
BASOPHILS # BLD AUTO: 0.04 K/UL — SIGNIFICANT CHANGE UP (ref 0–0.2)
BASOPHILS NFR BLD AUTO: 0.4 % — SIGNIFICANT CHANGE UP (ref 0–2)
BILIRUB SERPL-MCNC: < 0.2 MG/DL — LOW (ref 0.2–1.2)
BUN SERPL-MCNC: 12 MG/DL — SIGNIFICANT CHANGE UP (ref 7–23)
CALCIUM SERPL-MCNC: 9.2 MG/DL — SIGNIFICANT CHANGE UP (ref 8.4–10.5)
CHLORIDE SERPL-SCNC: 103 MMOL/L — SIGNIFICANT CHANGE UP (ref 98–107)
CO2 SERPL-SCNC: 19 MMOL/L — LOW (ref 22–31)
CREAT SERPL-MCNC: 0.91 MG/DL — SIGNIFICANT CHANGE UP (ref 0.5–1.3)
EOSINOPHIL # BLD AUTO: 0.04 K/UL — SIGNIFICANT CHANGE UP (ref 0–0.5)
EOSINOPHIL NFR BLD AUTO: 0.4 % — SIGNIFICANT CHANGE UP (ref 0–6)
ETHANOL BLD-MCNC: 243 MG/DL — HIGH
GLUCOSE SERPL-MCNC: 123 MG/DL — HIGH (ref 70–99)
HCG SERPL-ACNC: < 5 MIU/ML — SIGNIFICANT CHANGE UP
HCT VFR BLD CALC: 36.6 % — SIGNIFICANT CHANGE UP (ref 34.5–45)
HGB BLD-MCNC: 11.6 G/DL — SIGNIFICANT CHANGE UP (ref 11.5–15.5)
IMM GRANULOCYTES # BLD AUTO: 0.06 # — SIGNIFICANT CHANGE UP
IMM GRANULOCYTES NFR BLD AUTO: 0.7 % — SIGNIFICANT CHANGE UP (ref 0–1.5)
LYMPHOCYTES # BLD AUTO: 1.53 K/UL — SIGNIFICANT CHANGE UP (ref 1–3.3)
LYMPHOCYTES # BLD AUTO: 16.9 % — SIGNIFICANT CHANGE UP (ref 13–44)
MCHC RBC-ENTMCNC: 25.3 PG — LOW (ref 27–34)
MCHC RBC-ENTMCNC: 31.7 % — LOW (ref 32–36)
MCV RBC AUTO: 79.9 FL — LOW (ref 80–100)
MONOCYTES # BLD AUTO: 0.57 K/UL — SIGNIFICANT CHANGE UP (ref 0–0.9)
MONOCYTES NFR BLD AUTO: 6.3 % — SIGNIFICANT CHANGE UP (ref 2–14)
NEUTROPHILS # BLD AUTO: 6.8 K/UL — SIGNIFICANT CHANGE UP (ref 1.8–7.4)
NEUTROPHILS NFR BLD AUTO: 75.3 % — SIGNIFICANT CHANGE UP (ref 43–77)
NRBC # FLD: 0 — SIGNIFICANT CHANGE UP
PLATELET # BLD AUTO: 234 K/UL — SIGNIFICANT CHANGE UP (ref 150–400)
PMV BLD: 10.2 FL — SIGNIFICANT CHANGE UP (ref 7–13)
POTASSIUM SERPL-MCNC: 3.3 MMOL/L — LOW (ref 3.5–5.3)
POTASSIUM SERPL-SCNC: 3.3 MMOL/L — LOW (ref 3.5–5.3)
PROT SERPL-MCNC: 7.3 G/DL — SIGNIFICANT CHANGE UP (ref 6–8.3)
RBC # BLD: 4.58 M/UL — SIGNIFICANT CHANGE UP (ref 3.8–5.2)
RBC # FLD: 15.3 % — HIGH (ref 10.3–14.5)
SALICYLATES SERPL-MCNC: < 5 MG/DL — LOW (ref 15–30)
SODIUM SERPL-SCNC: 142 MMOL/L — SIGNIFICANT CHANGE UP (ref 135–145)
TSH SERPL-MCNC: 1.65 UIU/ML — SIGNIFICANT CHANGE UP (ref 0.27–4.2)
WBC # BLD: 9.04 K/UL — SIGNIFICANT CHANGE UP (ref 3.8–10.5)
WBC # FLD AUTO: 9.04 K/UL — SIGNIFICANT CHANGE UP (ref 3.8–10.5)

## 2018-09-22 PROCEDURE — 99285 EMERGENCY DEPT VISIT HI MDM: CPT | Mod: 25

## 2018-09-22 PROCEDURE — 70450 CT HEAD/BRAIN W/O DYE: CPT | Mod: 26

## 2018-09-22 PROCEDURE — 70486 CT MAXILLOFACIAL W/O DYE: CPT | Mod: 26

## 2018-09-22 PROCEDURE — 72125 CT NECK SPINE W/O DYE: CPT | Mod: 26

## 2018-09-22 RX ORDER — DIPHENHYDRAMINE HCL 50 MG
50 CAPSULE ORAL ONCE
Qty: 0 | Refills: 0 | Status: DISCONTINUED | OUTPATIENT
Start: 2018-09-22 | End: 2018-09-22

## 2018-09-22 RX ORDER — SODIUM CHLORIDE 9 MG/ML
1000 INJECTION INTRAMUSCULAR; INTRAVENOUS; SUBCUTANEOUS ONCE
Qty: 0 | Refills: 0 | Status: COMPLETED | OUTPATIENT
Start: 2018-09-22 | End: 2018-09-22

## 2018-09-22 RX ORDER — TETANUS TOXOID, REDUCED DIPHTHERIA TOXOID AND ACELLULAR PERTUSSIS VACCINE, ADSORBED 5; 2.5; 8; 8; 2.5 [IU]/.5ML; [IU]/.5ML; UG/.5ML; UG/.5ML; UG/.5ML
0.5 SUSPENSION INTRAMUSCULAR ONCE
Qty: 0 | Refills: 0 | Status: COMPLETED | OUTPATIENT
Start: 2018-09-22 | End: 2018-09-22

## 2018-09-22 RX ORDER — HALOPERIDOL DECANOATE 100 MG/ML
5 INJECTION INTRAMUSCULAR ONCE
Qty: 0 | Refills: 0 | Status: COMPLETED | OUTPATIENT
Start: 2018-09-22 | End: 2018-09-22

## 2018-09-22 RX ADMIN — Medication 2 MILLIGRAM(S): at 04:45

## 2018-09-22 RX ADMIN — SODIUM CHLORIDE 1000 MILLILITER(S): 9 INJECTION INTRAMUSCULAR; INTRAVENOUS; SUBCUTANEOUS at 08:22

## 2018-09-22 RX ADMIN — TETANUS TOXOID, REDUCED DIPHTHERIA TOXOID AND ACELLULAR PERTUSSIS VACCINE, ADSORBED 0.5 MILLILITER(S): 5; 2.5; 8; 8; 2.5 SUSPENSION INTRAMUSCULAR at 08:19

## 2018-09-22 RX ADMIN — HALOPERIDOL DECANOATE 5 MILLIGRAM(S): 100 INJECTION INTRAMUSCULAR at 04:48
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
## 2018-09-22 NOTE — ED ADULT NURSE REASSESSMENT NOTE - NS ED NURSE REASSESS COMMENT FT1
Patient resting in no distress, denies any pain at this time, calm and cooperative, will continue to monitor.
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
## 2018-09-22 NOTE — ED ADULT NURSE REASSESSMENT NOTE - NS ED NURSE REASSESS COMMENT FT1
Patient awake and alert, assisted to the bathroom, steady ambulating. Patient A & O  x 3  but does not  remember falling or injuring herself last night.

## 2018-09-22 NOTE — ED ADULT NURSE REASSESSMENT NOTE - NS ED NURSE REASSESS COMMENT FT1
Patients belongings returned, patient walked to waiting area , attempting to call a family member to pick her up. Patient refused offer of a [employer] Card.
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
## 2018-09-22 NOTE — ED ADULT NURSE REASSESSMENT NOTE - NS ED NURSE REASSESS COMMENT FT1
Patient alert and oriented x3, getting dressed for discharge, refused anything to eat or drink. Awaiting discharge.
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
## 2018-09-22 NOTE — ED ADULT NURSE REASSESSMENT NOTE - NS ED NURSE REASSESS COMMENT FT1
PT in triage climbing out of stretcher and attempting to undress and urinate on floor. Security called to triage, PES and Female RN in triage. PT escorted to bathroom to assist in changing clothes. Pt uncooperative and aggressive towards staff. Charge RN notified: Pt taken to room 23.
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
## 2018-09-22 NOTE — ED ADULT NURSE NOTE - NSIMPLEMENTINTERV_GEN_ALL_ED
Implemented All Fall Risk Interventions:  [place] to call system. Call bell, personal items and telephone within reach. Instruct patient to call for assistance. Room bathroom lighting operational. Non-slip footwear when patient is off stretcher. Physically safe environment: no spills, clutter or unnecessary equipment. Stretcher in lowest position, wheels locked, appropriate side rails in place. Provide visual cue, wrist band, yellow gown, etc. Monitor gait and stability. Monitor for mental status changes and reorient to person, place, and time. Review medications for side effects contributing to fall risk. Reinforce activity limits and safety measures with patient and family.

## 2019-02-27 ENCOUNTER — RESULT REVIEW (OUTPATIENT)
Age: 49
End: 2019-02-27

## 2019-03-14 ENCOUNTER — RESULT REVIEW (OUTPATIENT)
Age: 49
End: 2019-03-14

## 2019-07-15 ENCOUNTER — EMERGENCY (EMERGENCY)
Facility: HOSPITAL | Age: 49
LOS: 1 days | Discharge: HOME | End: 2019-07-15
Attending: EMERGENCY MEDICINE | Admitting: EMERGENCY MEDICINE
Payer: MEDICAID

## 2019-07-15 VITALS
RESPIRATION RATE: 18 BRPM | TEMPERATURE: 98 F | DIASTOLIC BLOOD PRESSURE: 79 MMHG | SYSTOLIC BLOOD PRESSURE: 129 MMHG | HEART RATE: 75 BPM | OXYGEN SATURATION: 100 %

## 2019-07-15 VITALS
RESPIRATION RATE: 20 BRPM | HEART RATE: 80 BPM | TEMPERATURE: 98 F | SYSTOLIC BLOOD PRESSURE: 137 MMHG | DIASTOLIC BLOOD PRESSURE: 90 MMHG | OXYGEN SATURATION: 100 %

## 2019-07-15 DIAGNOSIS — Z90.13 ACQUIRED ABSENCE OF BILATERAL BREASTS AND NIPPLES: Chronic | ICD-10-CM

## 2019-07-15 LAB
ALBUMIN SERPL ELPH-MCNC: 4.2 G/DL — SIGNIFICANT CHANGE UP (ref 3.3–5)
ALP SERPL-CCNC: 85 U/L — SIGNIFICANT CHANGE UP (ref 40–120)
ALT FLD-CCNC: 19 U/L — SIGNIFICANT CHANGE UP (ref 4–33)
ANION GAP SERPL CALC-SCNC: 13 MMO/L — SIGNIFICANT CHANGE UP (ref 7–14)
APPEARANCE UR: CLEAR — SIGNIFICANT CHANGE UP
AST SERPL-CCNC: 25 U/L — SIGNIFICANT CHANGE UP (ref 4–32)
BACTERIA # UR AUTO: HIGH
BASOPHILS # BLD AUTO: 0.02 K/UL — SIGNIFICANT CHANGE UP (ref 0–0.2)
BASOPHILS NFR BLD AUTO: 0.2 % — SIGNIFICANT CHANGE UP (ref 0–2)
BILIRUB SERPL-MCNC: 0.4 MG/DL — SIGNIFICANT CHANGE UP (ref 0.2–1.2)
BILIRUB UR-MCNC: NEGATIVE — SIGNIFICANT CHANGE UP
BLOOD UR QL VISUAL: SIGNIFICANT CHANGE UP
BUN SERPL-MCNC: 10 MG/DL — SIGNIFICANT CHANGE UP (ref 7–23)
CALCIUM SERPL-MCNC: 9 MG/DL — SIGNIFICANT CHANGE UP (ref 8.4–10.5)
CHLORIDE SERPL-SCNC: 103 MMOL/L — SIGNIFICANT CHANGE UP (ref 98–107)
CO2 SERPL-SCNC: 25 MMOL/L — SIGNIFICANT CHANGE UP (ref 22–31)
COLOR SPEC: SIGNIFICANT CHANGE UP
CREAT SERPL-MCNC: 0.72 MG/DL — SIGNIFICANT CHANGE UP (ref 0.5–1.3)
EOSINOPHIL # BLD AUTO: 0.01 K/UL — SIGNIFICANT CHANGE UP (ref 0–0.5)
EOSINOPHIL NFR BLD AUTO: 0.1 % — SIGNIFICANT CHANGE UP (ref 0–6)
GLUCOSE SERPL-MCNC: 119 MG/DL — HIGH (ref 70–99)
GLUCOSE UR-MCNC: NEGATIVE — SIGNIFICANT CHANGE UP
HCG SERPL-ACNC: < 5 MIU/ML — SIGNIFICANT CHANGE UP
HCT VFR BLD CALC: 37.6 % — SIGNIFICANT CHANGE UP (ref 34.5–45)
HGB BLD-MCNC: 12.3 G/DL — SIGNIFICANT CHANGE UP (ref 11.5–15.5)
HYALINE CASTS # UR AUTO: NEGATIVE — SIGNIFICANT CHANGE UP
IMM GRANULOCYTES NFR BLD AUTO: 0.7 % — SIGNIFICANT CHANGE UP (ref 0–1.5)
KETONES UR-MCNC: SIGNIFICANT CHANGE UP
LEUKOCYTE ESTERASE UR-ACNC: NEGATIVE — SIGNIFICANT CHANGE UP
LIDOCAIN IGE QN: 16.8 U/L — SIGNIFICANT CHANGE UP (ref 7–60)
LYMPHOCYTES # BLD AUTO: 1.09 K/UL — SIGNIFICANT CHANGE UP (ref 1–3.3)
LYMPHOCYTES # BLD AUTO: 10.7 % — LOW (ref 13–44)
MCHC RBC-ENTMCNC: 26.6 PG — LOW (ref 27–34)
MCHC RBC-ENTMCNC: 32.7 % — SIGNIFICANT CHANGE UP (ref 32–36)
MCV RBC AUTO: 81.4 FL — SIGNIFICANT CHANGE UP (ref 80–100)
MONOCYTES # BLD AUTO: 0.44 K/UL — SIGNIFICANT CHANGE UP (ref 0–0.9)
MONOCYTES NFR BLD AUTO: 4.3 % — SIGNIFICANT CHANGE UP (ref 2–14)
NEUTROPHILS # BLD AUTO: 8.6 K/UL — HIGH (ref 1.8–7.4)
NEUTROPHILS NFR BLD AUTO: 84 % — HIGH (ref 43–77)
NITRITE UR-MCNC: NEGATIVE — SIGNIFICANT CHANGE UP
NRBC # FLD: 0 K/UL — SIGNIFICANT CHANGE UP (ref 0–0)
PH UR: 7 — SIGNIFICANT CHANGE UP (ref 5–8)
PLATELET # BLD AUTO: 242 K/UL — SIGNIFICANT CHANGE UP (ref 150–400)
PMV BLD: 9.6 FL — SIGNIFICANT CHANGE UP (ref 7–13)
POTASSIUM SERPL-MCNC: 3.5 MMOL/L — SIGNIFICANT CHANGE UP (ref 3.5–5.3)
POTASSIUM SERPL-SCNC: 3.5 MMOL/L — SIGNIFICANT CHANGE UP (ref 3.5–5.3)
PROT SERPL-MCNC: 7.8 G/DL — SIGNIFICANT CHANGE UP (ref 6–8.3)
PROT UR-MCNC: 50 — SIGNIFICANT CHANGE UP
RBC # BLD: 4.62 M/UL — SIGNIFICANT CHANGE UP (ref 3.8–5.2)
RBC # FLD: 14.6 % — HIGH (ref 10.3–14.5)
RBC CASTS # UR COMP ASSIST: SIGNIFICANT CHANGE UP (ref 0–?)
SODIUM SERPL-SCNC: 141 MMOL/L — SIGNIFICANT CHANGE UP (ref 135–145)
SP GR SPEC: 1.02 — SIGNIFICANT CHANGE UP (ref 1–1.04)
SQUAMOUS # UR AUTO: SIGNIFICANT CHANGE UP
TROPONIN T, HIGH SENSITIVITY: < 6 NG/L — SIGNIFICANT CHANGE UP (ref ?–14)
UROBILINOGEN FLD QL: NORMAL — SIGNIFICANT CHANGE UP
WBC # BLD: 10.23 K/UL — SIGNIFICANT CHANGE UP (ref 3.8–10.5)
WBC # FLD AUTO: 10.23 K/UL — SIGNIFICANT CHANGE UP (ref 3.8–10.5)
WBC UR QL: SIGNIFICANT CHANGE UP (ref 0–?)

## 2019-07-15 PROCEDURE — 74177 CT ABD & PELVIS W/CONTRAST: CPT | Mod: 26

## 2019-07-15 PROCEDURE — 99284 EMERGENCY DEPT VISIT MOD MDM: CPT

## 2019-07-15 PROCEDURE — 76705 ECHO EXAM OF ABDOMEN: CPT | Mod: 26

## 2019-07-15 RX ORDER — ONDANSETRON 8 MG/1
4 TABLET, FILM COATED ORAL ONCE
Refills: 0 | Status: COMPLETED | OUTPATIENT
Start: 2019-07-15 | End: 2019-07-15

## 2019-07-15 RX ORDER — MORPHINE SULFATE 50 MG/1
8 CAPSULE, EXTENDED RELEASE ORAL ONCE
Refills: 0 | Status: DISCONTINUED | OUTPATIENT
Start: 2019-07-15 | End: 2019-07-15

## 2019-07-15 RX ORDER — MORPHINE SULFATE 50 MG/1
6 CAPSULE, EXTENDED RELEASE ORAL ONCE
Refills: 0 | Status: DISCONTINUED | OUTPATIENT
Start: 2019-07-15 | End: 2019-07-15

## 2019-07-15 RX ORDER — IBUPROFEN 200 MG
1 TABLET ORAL
Qty: 42 | Refills: 0
Start: 2019-07-15 | End: 2019-07-28

## 2019-07-15 RX ORDER — FAMOTIDINE 10 MG/ML
1 INJECTION INTRAVENOUS
Qty: 60 | Refills: 0
Start: 2019-07-15 | End: 2019-08-13

## 2019-07-15 RX ORDER — SODIUM CHLORIDE 9 MG/ML
1000 INJECTION INTRAMUSCULAR; INTRAVENOUS; SUBCUTANEOUS ONCE
Refills: 0 | Status: COMPLETED | OUTPATIENT
Start: 2019-07-15 | End: 2019-07-15

## 2019-07-15 RX ORDER — FAMOTIDINE 10 MG/ML
20 INJECTION INTRAVENOUS ONCE
Refills: 0 | Status: COMPLETED | OUTPATIENT
Start: 2019-07-15 | End: 2019-07-15

## 2019-07-15 RX ADMIN — ONDANSETRON 4 MILLIGRAM(S): 8 TABLET, FILM COATED ORAL at 10:37

## 2019-07-15 RX ADMIN — MORPHINE SULFATE 8 MILLIGRAM(S): 50 CAPSULE, EXTENDED RELEASE ORAL at 12:52

## 2019-07-15 RX ADMIN — FAMOTIDINE 20 MILLIGRAM(S): 10 INJECTION INTRAVENOUS at 10:51

## 2019-07-15 RX ADMIN — SODIUM CHLORIDE 1000 MILLILITER(S): 9 INJECTION INTRAMUSCULAR; INTRAVENOUS; SUBCUTANEOUS at 10:36

## 2019-07-15 RX ADMIN — MORPHINE SULFATE 6 MILLIGRAM(S): 50 CAPSULE, EXTENDED RELEASE ORAL at 10:37

## 2019-07-15 NOTE — ED PROVIDER NOTE - OBJECTIVE STATEMENT
49F h/o GERD, remote breast cancer s/p bilateral mastectomy presents with acute onset epigastric pain.  Pt states pain started around 4am, epigastric radiating to back, thought it was gas but not passing.  Has been self-inducing vomit as that helps "relieve pressure" in the epigastric area.  Denies fever/chills, cp, sob, nausea, diarrhea, urinary symptoms.  Last BM this morning.

## 2019-07-15 NOTE — ED PROVIDER NOTE - PROGRESS NOTE DETAILS
Patient feels improved, non-tender on exam.  Discussed findings of cholelithiasis, and will give surgery referral.  Discharge with strict return precautions.

## 2019-07-15 NOTE — ED PROVIDER NOTE - NSFOLLOWUPINSTRUCTIONS_ED_ALL_ED_FT
Please follow up with a surgeon regarding your gallbladder:  [phone].    You may take pain medications prescribed as necessary.  Also recommend avoidance of fatty foods.    If symptoms worsen, please return to the emergency department.

## 2019-07-15 NOTE — ED PROVIDER NOTE - CLINICAL SUMMARY MEDICAL DECISION MAKING FREE TEXT BOX
- epigastric pain, consider cholecystitis/pancreatitis, r/o ACS, less likely but consider early SBO   - cbc, cmp, lipase, hcg, ua, urine culture, RUQ US, CT abd/pelvis

## 2019-07-16 LAB — SPECIMEN SOURCE: SIGNIFICANT CHANGE UP

## 2019-07-17 LAB
-  AMIKACIN: SIGNIFICANT CHANGE UP
-  AMPICILLIN/SULBACTAM: SIGNIFICANT CHANGE UP
-  AMPICILLIN: SIGNIFICANT CHANGE UP
-  AZTREONAM: SIGNIFICANT CHANGE UP
-  CEFAZOLIN: SIGNIFICANT CHANGE UP
-  CEFEPIME: SIGNIFICANT CHANGE UP
-  CEFOXITIN: SIGNIFICANT CHANGE UP
-  CEFTAZIDIME: SIGNIFICANT CHANGE UP
-  CEFTRIAXONE: SIGNIFICANT CHANGE UP
-  CIPROFLOXACIN: SIGNIFICANT CHANGE UP
-  ERTAPENEM: SIGNIFICANT CHANGE UP
-  GENTAMICIN: SIGNIFICANT CHANGE UP
-  IMIPENEM: SIGNIFICANT CHANGE UP
-  LEVOFLOXACIN: SIGNIFICANT CHANGE UP
-  MEROPENEM: SIGNIFICANT CHANGE UP
-  NITROFURANTOIN: SIGNIFICANT CHANGE UP
-  PIPERACILLIN/TAZOBACTAM: SIGNIFICANT CHANGE UP
-  TOBRAMYCIN: SIGNIFICANT CHANGE UP
-  TRIMETHOPRIM/SULFAMETHOXAZOLE: SIGNIFICANT CHANGE UP
BACTERIA UR CULT: SIGNIFICANT CHANGE UP
METHOD TYPE: SIGNIFICANT CHANGE UP
ORGANISM # SPEC MICROSCOPIC CNT: SIGNIFICANT CHANGE UP
ORGANISM # SPEC MICROSCOPIC CNT: SIGNIFICANT CHANGE UP

## 2019-07-18 ENCOUNTER — TRANSCRIPTION ENCOUNTER (OUTPATIENT)
Age: 49
End: 2019-07-18

## 2019-07-19 RX ORDER — CEPHALEXIN 500 MG
1 CAPSULE ORAL
Qty: 14 | Refills: 0
Start: 2019-07-19 | End: 2019-07-25

## 2019-07-19 RX ORDER — FLUCONAZOLE 150 MG/1
1 TABLET ORAL
Qty: 1 | Refills: 0
Start: 2019-07-19 | End: 2019-07-19

## 2019-07-19 NOTE — ED POST DISCHARGE NOTE - DETAILS
left messages on all numbers listed to call back ED spoke with pt's  and he states he will have his wife call back ED. Plan: Call pt again

## 2019-07-23 ENCOUNTER — APPOINTMENT (OUTPATIENT)
Dept: SURGERY | Facility: HOSPITAL | Age: 49
End: 2019-07-23
Payer: MEDICAID

## 2019-07-23 ENCOUNTER — OUTPATIENT (OUTPATIENT)
Dept: OUTPATIENT SERVICES | Facility: HOSPITAL | Age: 49
LOS: 1 days | End: 2019-07-23

## 2019-07-23 VITALS
HEART RATE: 78 BPM | WEIGHT: 142.75 LBS | HEIGHT: 63 IN | DIASTOLIC BLOOD PRESSURE: 85 MMHG | BODY MASS INDEX: 25.29 KG/M2 | SYSTOLIC BLOOD PRESSURE: 136 MMHG

## 2019-07-23 DIAGNOSIS — K83.9 DISEASE OF BILIARY TRACT, UNSPECIFIED: ICD-10-CM

## 2019-07-23 DIAGNOSIS — Z90.13 ACQUIRED ABSENCE OF BILATERAL BREASTS AND NIPPLES: Chronic | ICD-10-CM

## 2019-07-23 PROCEDURE — 99201 OFFICE OUTPATIENT NEW 10 MINUTES: CPT

## 2019-08-01 ENCOUNTER — OUTPATIENT (OUTPATIENT)
Dept: OUTPATIENT SERVICES | Facility: HOSPITAL | Age: 49
LOS: 1 days | End: 2019-08-01

## 2019-08-01 DIAGNOSIS — Z90.13 ACQUIRED ABSENCE OF BILATERAL BREASTS AND NIPPLES: Chronic | ICD-10-CM

## 2019-08-05 ENCOUNTER — TRANSCRIPTION ENCOUNTER (OUTPATIENT)
Age: 49
End: 2019-08-05

## 2019-08-05 ENCOUNTER — OUTPATIENT (OUTPATIENT)
Dept: OUTPATIENT SERVICES | Facility: HOSPITAL | Age: 49
LOS: 1 days | End: 2019-08-05

## 2019-08-05 VITALS
SYSTOLIC BLOOD PRESSURE: 130 MMHG | HEIGHT: 63.5 IN | DIASTOLIC BLOOD PRESSURE: 78 MMHG | WEIGHT: 141.98 LBS | OXYGEN SATURATION: 98 % | HEART RATE: 84 BPM | TEMPERATURE: 98 F | RESPIRATION RATE: 16 BRPM

## 2019-08-05 DIAGNOSIS — K80.20 CALCULUS OF GALLBLADDER WITHOUT CHOLECYSTITIS WITHOUT OBSTRUCTION: ICD-10-CM

## 2019-08-05 DIAGNOSIS — Z90.13 ACQUIRED ABSENCE OF BILATERAL BREASTS AND NIPPLES: Chronic | ICD-10-CM

## 2019-08-05 NOTE — ASU PATIENT PROFILE, ADULT - PMH
Anxiety    Breast cancer  dx: 6/2013:  Right  Calculus of gallbladder without cholangitis or cholecystitis    History of hepatitis B  ' 90's  Medial meniscus tear  '2002:  Right Knee  Multiparity    Pseudotumor (inflammatory) of orbit  Right:  ' 2001.  Treated with prednisone. No surgery  Sickle cell trait

## 2019-08-05 NOTE — H&P PST ADULT - NSICDXPROBLEM_GEN_ALL_CORE_FT
dx calculus of gallbladder:  scheduled for cholecystectomy possible open on 8/6/2019  written and verbal preop instructions & surgical soap given  Gi prophylaxis - pt instructed to take own.  Pt verbalized good understanding.  teach back method used.  case discussed with Dr Khan.    Anxiety:  continue medication per routine schedule

## 2019-08-05 NOTE — H&P PST ADULT - NEGATIVE CARDIOVASCULAR SYMPTOMS
no chest pain/no dyspnea on exertion/no claudication/no paroxysmal nocturnal dyspnea/no orthopnea/no palpitations/no peripheral edema

## 2019-08-05 NOTE — ASU PATIENT PROFILE, ADULT - PSH
History of  Section  '  and 2007  History of mastectomy, bilateral  2013 with NIKITA  Normal vaginal delivery  /  /    S/P knee surgery  ' :  Right Knee Arthroscopy  S/P tubal ligation  '

## 2019-08-05 NOTE — H&P PST ADULT - NSICDXPASTSURGICALHX_GEN_ALL_CORE_FT
PAST SURGICAL HISTORY:  History of  Section '  and 2007    History of mastectomy, bilateral 2013 with NIKITA    Normal vaginal delivery /  /      S/P knee surgery ' :  Right Knee Arthroscopy    S/P tubal ligation '

## 2019-08-05 NOTE — H&P PST ADULT - HISTORY OF PRESENT ILLNESS
50y/o female presents for preop eval for scheduled laparoscopic cholecystectomy possible open on 8/6/2018.  Per pt seen in [facility] ER on 7/15/19 for severe abdominal pain and nausea.  Imaging studies done and  Dx with calculus of gallbladder.  Pt currently denies symptoms.

## 2019-08-05 NOTE — H&P PST ADULT - LYMPHATIC
posterior cervical L/anterior cervical L/posterior cervical R/supraclavicular L/supraclavicular R/anterior cervical R

## 2019-08-05 NOTE — H&P PST ADULT - NSICDXFAMILYHX_GEN_ALL_CORE_FT
FAMILY HISTORY:  Family history of sickle cell anemia, son  Family history of sickle cell trait in father

## 2019-08-05 NOTE — H&P PST ADULT - GIT GEN HX ROS MEA POS PC
abdominal pain/nausea/h/o radiates to back, was seen in [facility] ER on 7/15/2019.  Refer to hpi

## 2019-08-06 ENCOUNTER — INPATIENT (INPATIENT)
Facility: HOSPITAL | Age: 49
LOS: 0 days | Discharge: HOME | End: 2019-08-07
Attending: SURGERY | Admitting: SURGERY
Payer: MEDICAID

## 2019-08-06 ENCOUNTER — RESULT REVIEW (OUTPATIENT)
Age: 49
End: 2019-08-06

## 2019-08-06 VITALS
DIASTOLIC BLOOD PRESSURE: 78 MMHG | HEIGHT: 63.5 IN | SYSTOLIC BLOOD PRESSURE: 136 MMHG | RESPIRATION RATE: 14 BRPM | WEIGHT: 141.98 LBS | HEART RATE: 78 BPM | OXYGEN SATURATION: 100 % | TEMPERATURE: 98 F

## 2019-08-06 DIAGNOSIS — K80.20 CALCULUS OF GALLBLADDER WITHOUT CHOLECYSTITIS WITHOUT OBSTRUCTION: ICD-10-CM

## 2019-08-06 DIAGNOSIS — Z90.13 ACQUIRED ABSENCE OF BILATERAL BREASTS AND NIPPLES: Chronic | ICD-10-CM

## 2019-08-06 LAB
GRAM STN WND: SIGNIFICANT CHANGE UP
SPECIMEN SOURCE: SIGNIFICANT CHANGE UP

## 2019-08-06 PROCEDURE — 88304 TISSUE EXAM BY PATHOLOGIST: CPT | Mod: 26

## 2019-08-06 PROCEDURE — 99222 1ST HOSP IP/OBS MODERATE 55: CPT | Mod: 57

## 2019-08-06 PROCEDURE — 88305 TISSUE EXAM BY PATHOLOGIST: CPT | Mod: 26

## 2019-08-06 PROCEDURE — 88112 CYTOPATH CELL ENHANCE TECH: CPT | Mod: 26

## 2019-08-06 RX ORDER — FAMOTIDINE 10 MG/ML
20 INJECTION INTRAVENOUS
Refills: 0 | Status: DISCONTINUED | OUTPATIENT
Start: 2019-08-06 | End: 2019-08-07

## 2019-08-06 RX ORDER — OXYCODONE HYDROCHLORIDE 5 MG/1
5 TABLET ORAL EVERY 4 HOURS
Refills: 0 | Status: DISCONTINUED | OUTPATIENT
Start: 2019-08-06 | End: 2019-08-07

## 2019-08-06 RX ORDER — HYDROMORPHONE HYDROCHLORIDE 2 MG/ML
0.5 INJECTION INTRAMUSCULAR; INTRAVENOUS; SUBCUTANEOUS
Refills: 0 | Status: DISCONTINUED | OUTPATIENT
Start: 2019-08-06 | End: 2019-08-06

## 2019-08-06 RX ORDER — SODIUM CHLORIDE 9 MG/ML
1000 INJECTION, SOLUTION INTRAVENOUS
Refills: 0 | Status: DISCONTINUED | OUTPATIENT
Start: 2019-08-06 | End: 2019-08-06

## 2019-08-06 RX ORDER — ENOXAPARIN SODIUM 100 MG/ML
40 INJECTION SUBCUTANEOUS DAILY
Refills: 0 | Status: DISCONTINUED | OUTPATIENT
Start: 2019-08-07 | End: 2019-08-07

## 2019-08-06 RX ORDER — SERTRALINE 25 MG/1
150 TABLET, FILM COATED ORAL DAILY
Refills: 0 | Status: DISCONTINUED | OUTPATIENT
Start: 2019-08-06 | End: 2019-08-07

## 2019-08-06 RX ORDER — ONDANSETRON 8 MG/1
4 TABLET, FILM COATED ORAL ONCE
Refills: 0 | Status: DISCONTINUED | OUTPATIENT
Start: 2019-08-06 | End: 2019-08-06

## 2019-08-06 RX ORDER — ACETAMINOPHEN 500 MG
650 TABLET ORAL EVERY 6 HOURS
Refills: 0 | Status: DISCONTINUED | OUTPATIENT
Start: 2019-08-06 | End: 2019-08-07

## 2019-08-06 RX ORDER — MIRTAZAPINE 45 MG/1
7.5 TABLET, ORALLY DISINTEGRATING ORAL DAILY
Refills: 0 | Status: DISCONTINUED | OUTPATIENT
Start: 2019-08-06 | End: 2019-08-07

## 2019-08-06 RX ADMIN — Medication 650 MILLIGRAM(S): at 16:40

## 2019-08-06 RX ADMIN — SODIUM CHLORIDE 50 MILLILITER(S): 9 INJECTION, SOLUTION INTRAVENOUS at 12:15

## 2019-08-06 RX ADMIN — OXYCODONE HYDROCHLORIDE 5 MILLIGRAM(S): 5 TABLET ORAL at 22:22

## 2019-08-06 RX ADMIN — FAMOTIDINE 20 MILLIGRAM(S): 10 INJECTION INTRAVENOUS at 18:28

## 2019-08-06 RX ADMIN — OXYCODONE HYDROCHLORIDE 5 MILLIGRAM(S): 5 TABLET ORAL at 15:30

## 2019-08-06 RX ADMIN — HYDROMORPHONE HYDROCHLORIDE 0.5 MILLIGRAM(S): 2 INJECTION INTRAMUSCULAR; INTRAVENOUS; SUBCUTANEOUS at 12:30

## 2019-08-06 RX ADMIN — HYDROMORPHONE HYDROCHLORIDE 0.5 MILLIGRAM(S): 2 INJECTION INTRAMUSCULAR; INTRAVENOUS; SUBCUTANEOUS at 12:11

## 2019-08-06 RX ADMIN — OXYCODONE HYDROCHLORIDE 5 MILLIGRAM(S): 5 TABLET ORAL at 21:52

## 2019-08-06 RX ADMIN — Medication 650 MILLIGRAM(S): at 17:00

## 2019-08-06 RX ADMIN — Medication 10 MILLIGRAM(S): at 21:53

## 2019-08-06 RX ADMIN — OXYCODONE HYDROCHLORIDE 5 MILLIGRAM(S): 5 TABLET ORAL at 19:04

## 2019-08-06 RX ADMIN — MIRTAZAPINE 7.5 MILLIGRAM(S): 45 TABLET, ORALLY DISINTEGRATING ORAL at 21:53

## 2019-08-06 RX ADMIN — OXYCODONE HYDROCHLORIDE 5 MILLIGRAM(S): 5 TABLET ORAL at 15:04

## 2019-08-06 RX ADMIN — OXYCODONE HYDROCHLORIDE 5 MILLIGRAM(S): 5 TABLET ORAL at 20:00

## 2019-08-06 NOTE — CHART NOTE - NSCHARTNOTEFT_GEN_A_CORE
B Team Surgery Post Op Note     STATUS POST:  laparoscopic cholecystectomy    SUBJECTIVE: Pt seen s/p the above procedure. Tolerated well. Complains of minimal incision tenderness and right sided shoulder pain, controlled with pain meds. Tolerated PO intake. Denies SOB, chest pain, or nausea.     Vital Signs Last 24 Hrs  T(C): 36.6 (06 Aug 2019 18:15), Max: 36.9 (06 Aug 2019 06:11)  T(F): 97.9 (06 Aug 2019 18:15), Max: 98.4 (06 Aug 2019 06:11)  HR: 92 (06 Aug 2019 18:15) (78 - 106)  BP: 124/79 (06 Aug 2019 18:15) (107/69 - 136/78)  BP(mean): 87 (06 Aug 2019 17:00) (76 - 89)  RR: 16 (06 Aug 2019 18:15) (12 - 18)  SpO2: 96% (06 Aug 2019 18:15) (94% - 100%)  Vo:  NGT:  I&O's Summary    06 Aug 2019 07:01  -  06 Aug 2019 19:13  --------------------------------------------------------  IN: 510 mL / OUT: 200 mL / NET: 310 mL      I&O's Detail    06 Aug 2019 07:01  -  06 Aug 2019 19:13  --------------------------------------------------------  IN:    lactated ringers.: 150 mL    Oral Fluid: 360 mL  Total IN: 510 mL    OUT:    Voided: 200 mL  Total OUT: 200 mL    Total NET: 310 mL          PHYSICAL EXAM:  Constitutional: Awake, alert  Respiratory:  Lungs CTA, B/L, no rales , no wheezing, no rhonchi.  Cardiovascular:  S1, S2, RRR  Gastrointestinal: Abdomen soft, non distended, minimal tenderness.  Wound: C/D/I (dermabond)  Extremities:  No edema, no calf tenderrness,    Assessment: 49y year old Female s/p laparoscopic cholecystectomy. POD 0    PLAN:  - Admit to B Team Surgery  - Pain control as needed  - c/w home meds  - Reg diet  - Monitor I/Os  - DVT prophylaxis, ambulate as tolerated     [id number]  B Team                    LABS:

## 2019-08-07 ENCOUNTER — TRANSCRIPTION ENCOUNTER (OUTPATIENT)
Age: 49
End: 2019-08-07

## 2019-08-07 VITALS
SYSTOLIC BLOOD PRESSURE: 139 MMHG | DIASTOLIC BLOOD PRESSURE: 91 MMHG | TEMPERATURE: 98 F | HEART RATE: 89 BPM | RESPIRATION RATE: 18 BRPM | OXYGEN SATURATION: 92 %

## 2019-08-07 DIAGNOSIS — Z71.89 OTHER SPECIFIED COUNSELING: ICD-10-CM

## 2019-08-07 LAB
ALBUMIN SERPL ELPH-MCNC: 3.5 G/DL — SIGNIFICANT CHANGE UP (ref 3.3–5)
ALP SERPL-CCNC: 161 U/L — HIGH (ref 40–120)
ALT FLD-CCNC: 634 U/L — HIGH (ref 4–33)
ANION GAP SERPL CALC-SCNC: 11 MMO/L — SIGNIFICANT CHANGE UP (ref 7–14)
AST SERPL-CCNC: 745 U/L — HIGH (ref 4–32)
BILIRUB SERPL-MCNC: 1.1 MG/DL — SIGNIFICANT CHANGE UP (ref 0.2–1.2)
BUN SERPL-MCNC: 12 MG/DL — SIGNIFICANT CHANGE UP (ref 7–23)
CALCIUM SERPL-MCNC: 8 MG/DL — LOW (ref 8.4–10.5)
CHLORIDE SERPL-SCNC: 104 MMOL/L — SIGNIFICANT CHANGE UP (ref 98–107)
CO2 SERPL-SCNC: 25 MMOL/L — SIGNIFICANT CHANGE UP (ref 22–31)
CREAT SERPL-MCNC: 0.87 MG/DL — SIGNIFICANT CHANGE UP (ref 0.5–1.3)
GLUCOSE SERPL-MCNC: 129 MG/DL — HIGH (ref 70–99)
HCT VFR BLD CALC: 31.5 % — LOW (ref 34.5–45)
HGB BLD-MCNC: 10.1 G/DL — LOW (ref 11.5–15.5)
MAGNESIUM SERPL-MCNC: 1.8 MG/DL — SIGNIFICANT CHANGE UP (ref 1.6–2.6)
MCHC RBC-ENTMCNC: 26 PG — LOW (ref 27–34)
MCHC RBC-ENTMCNC: 32.1 % — SIGNIFICANT CHANGE UP (ref 32–36)
MCV RBC AUTO: 81.2 FL — SIGNIFICANT CHANGE UP (ref 80–100)
NON-GYNECOLOGICAL CYTOLOGY STUDY: SIGNIFICANT CHANGE UP
NRBC # FLD: 0 K/UL — SIGNIFICANT CHANGE UP (ref 0–0)
PHOSPHATE SERPL-MCNC: 1.6 MG/DL — LOW (ref 2.5–4.5)
PLATELET # BLD AUTO: 205 K/UL — SIGNIFICANT CHANGE UP (ref 150–400)
PMV BLD: 10.4 FL — SIGNIFICANT CHANGE UP (ref 7–13)
POTASSIUM SERPL-MCNC: 3.2 MMOL/L — LOW (ref 3.5–5.3)
POTASSIUM SERPL-SCNC: 3.2 MMOL/L — LOW (ref 3.5–5.3)
PROT SERPL-MCNC: 6.4 G/DL — SIGNIFICANT CHANGE UP (ref 6–8.3)
RBC # BLD: 3.88 M/UL — SIGNIFICANT CHANGE UP (ref 3.8–5.2)
RBC # FLD: 14.9 % — HIGH (ref 10.3–14.5)
SODIUM SERPL-SCNC: 140 MMOL/L — SIGNIFICANT CHANGE UP (ref 135–145)
WBC # BLD: 7.85 K/UL — SIGNIFICANT CHANGE UP (ref 3.8–10.5)
WBC # FLD AUTO: 7.85 K/UL — SIGNIFICANT CHANGE UP (ref 3.8–10.5)

## 2019-08-07 PROCEDURE — 47562 LAPAROSCOPIC CHOLECYSTECTOMY: CPT

## 2019-08-07 RX ORDER — OXYCODONE HYDROCHLORIDE 5 MG/1
1 TABLET ORAL
Qty: 10 | Refills: 0
Start: 2019-08-07 | End: 2019-08-09

## 2019-08-07 RX ORDER — ACETAMINOPHEN 500 MG
2 TABLET ORAL
Qty: 0 | Refills: 0 | DISCHARGE
Start: 2019-08-07

## 2019-08-07 RX ORDER — IBUPROFEN 200 MG
1 TABLET ORAL
Qty: 0 | Refills: 0 | DISCHARGE

## 2019-08-07 RX ORDER — OXYCODONE HYDROCHLORIDE 5 MG/1
5 TABLET ORAL EVERY 4 HOURS
Refills: 0 | Status: DISCONTINUED | OUTPATIENT
Start: 2019-08-07 | End: 2019-08-07

## 2019-08-07 RX ORDER — POTASSIUM CHLORIDE 20 MEQ
40 PACKET (EA) ORAL ONCE
Refills: 0 | Status: COMPLETED | OUTPATIENT
Start: 2019-08-07 | End: 2019-08-07

## 2019-08-07 RX ORDER — OXYCODONE HYDROCHLORIDE 5 MG/1
1 TABLET ORAL
Qty: 15 | Refills: 0
Start: 2019-08-07

## 2019-08-07 RX ORDER — MAGNESIUM SULFATE 500 MG/ML
2 VIAL (ML) INJECTION ONCE
Refills: 0 | Status: COMPLETED | OUTPATIENT
Start: 2019-08-07 | End: 2019-08-07

## 2019-08-07 RX ORDER — SIMETHICONE 80 MG/1
80 TABLET, CHEWABLE ORAL ONCE
Refills: 0 | Status: COMPLETED | OUTPATIENT
Start: 2019-08-07 | End: 2019-08-07

## 2019-08-07 RX ORDER — OXYCODONE HYDROCHLORIDE 5 MG/1
1 TABLET ORAL
Qty: 0 | Refills: 0 | DISCHARGE
Start: 2019-08-07

## 2019-08-07 RX ORDER — POTASSIUM PHOSPHATE, MONOBASIC POTASSIUM PHOSPHATE, DIBASIC 236; 224 MG/ML; MG/ML
15 INJECTION, SOLUTION INTRAVENOUS ONCE
Refills: 0 | Status: COMPLETED | OUTPATIENT
Start: 2019-08-07 | End: 2019-08-07

## 2019-08-07 RX ADMIN — OXYCODONE HYDROCHLORIDE 5 MILLIGRAM(S): 5 TABLET ORAL at 10:35

## 2019-08-07 RX ADMIN — Medication 10 MILLIGRAM(S): at 14:32

## 2019-08-07 RX ADMIN — OXYCODONE HYDROCHLORIDE 5 MILLIGRAM(S): 5 TABLET ORAL at 19:10

## 2019-08-07 RX ADMIN — SERTRALINE 150 MILLIGRAM(S): 25 TABLET, FILM COATED ORAL at 12:14

## 2019-08-07 RX ADMIN — SIMETHICONE 80 MILLIGRAM(S): 80 TABLET, CHEWABLE ORAL at 16:11

## 2019-08-07 RX ADMIN — OXYCODONE HYDROCHLORIDE 5 MILLIGRAM(S): 5 TABLET ORAL at 14:31

## 2019-08-07 RX ADMIN — Medication 10 MILLIGRAM(S): at 06:05

## 2019-08-07 RX ADMIN — Medication 40 MILLIEQUIVALENT(S): at 10:37

## 2019-08-07 RX ADMIN — OXYCODONE HYDROCHLORIDE 5 MILLIGRAM(S): 5 TABLET ORAL at 06:05

## 2019-08-07 RX ADMIN — OXYCODONE HYDROCHLORIDE 5 MILLIGRAM(S): 5 TABLET ORAL at 06:35

## 2019-08-07 RX ADMIN — OXYCODONE HYDROCHLORIDE 5 MILLIGRAM(S): 5 TABLET ORAL at 18:29

## 2019-08-07 RX ADMIN — OXYCODONE HYDROCHLORIDE 5 MILLIGRAM(S): 5 TABLET ORAL at 14:32

## 2019-08-07 RX ADMIN — OXYCODONE HYDROCHLORIDE 5 MILLIGRAM(S): 5 TABLET ORAL at 10:34

## 2019-08-07 RX ADMIN — Medication 50 GRAM(S): at 11:00

## 2019-08-07 RX ADMIN — Medication 650 MILLIGRAM(S): at 16:35

## 2019-08-07 RX ADMIN — FAMOTIDINE 20 MILLIGRAM(S): 10 INJECTION INTRAVENOUS at 18:29

## 2019-08-07 RX ADMIN — ENOXAPARIN SODIUM 40 MILLIGRAM(S): 100 INJECTION SUBCUTANEOUS at 12:13

## 2019-08-07 RX ADMIN — POTASSIUM PHOSPHATE, MONOBASIC POTASSIUM PHOSPHATE, DIBASIC 62.5 MILLIMOLE(S): 236; 224 INJECTION, SOLUTION INTRAVENOUS at 12:12

## 2019-08-07 RX ADMIN — OXYCODONE HYDROCHLORIDE 5 MILLIGRAM(S): 5 TABLET ORAL at 02:02

## 2019-08-07 RX ADMIN — Medication 650 MILLIGRAM(S): at 16:11

## 2019-08-07 RX ADMIN — FAMOTIDINE 20 MILLIGRAM(S): 10 INJECTION INTRAVENOUS at 06:05

## 2019-08-07 RX ADMIN — OXYCODONE HYDROCHLORIDE 5 MILLIGRAM(S): 5 TABLET ORAL at 02:32

## 2019-08-07 NOTE — DISCHARGE NOTE PROVIDER - NSDCFUADDAPPT_GEN_ALL_CORE_FT
Please call to make an appointment with Dr. Rdz with in 1 week from discharge   Please follow up with your primary care physician regarding your hospitalization

## 2019-08-07 NOTE — PROGRESS NOTE ADULT - ASSESSMENT
49F with sickle cell trait s/p laparoscopic cholecystectomy, tolerating pain and regular diet    Plan:  - c/w regular diet  - d/c today

## 2019-08-07 NOTE — PROGRESS NOTE ADULT - ATTENDING COMMENTS
I saw and examined the patient and agree with the above note.    Jose D Pimentel MD (Cell: [phone])  Acute and Critical Care Surgery    The Acute Care Surgery (B Team) Attending Group Practice:  Dr. Faraz Hines, Dr. Tobin Gonzalez, Dr. Caridad Puente, Dr. Jose D Pimentel    Urgent issues - spectra [id number] or [phone]  Nonurgent issues - [phone]  Patient appointments or after hours - [phone]

## 2019-08-07 NOTE — PROGRESS NOTE ADULT - SUBJECTIVE AND OBJECTIVE BOX
GENERAL SURGERY DAILY PROGRESS NOTE:    Interval:  No acute events overnight.    Subjective:  Patient seen and examined. Reports pain is well controlled. Denies N/V. Tolerating regular diet. -flatus/ -BM.    Vital Signs Last 24 Hrs  T(C): 36.6 (07 Aug 2019 02:16), Max: 36.9 (06 Aug 2019 06:11)  T(F): 97.8 (07 Aug 2019 02:16), Max: 98.4 (06 Aug 2019 06:11)  HR: 77 (07 Aug 2019 02:16) (77 - 106)  BP: 111/65 (07 Aug 2019 02:16) (107/69 - 136/78)  BP(mean): 87 (06 Aug 2019 17:00) (76 - 89)  RR: 16 (07 Aug 2019 02:16) (12 - 18)  SpO2: 95% (07 Aug 2019 02:16) (94% - 100%)    Exam:  Gen: NAD, resting in bed, alert and responding appropriately  Resp: Airway patent, non-labored respirations  Abd: Soft, ND, NT, no rebound or guarding. Incisions c/d/i  Ext: No edema, WWP  Neuro: AAOx3, no focal deficits    I&O's Detail    06 Aug 2019 07:01  -  07 Aug 2019 02:33  --------------------------------------------------------  IN:    lactated ringers.: 150 mL    Oral Fluid: 840 mL  Total IN: 990 mL    OUT:    Voided: 500 mL  Total OUT: 500 mL    Total NET: 490 mL          Daily Height in cm: 161.29 (06 Aug 2019 06:11)    Daily     MEDICATIONS  (STANDING):  busPIRone 10 milliGRAM(s) Oral three times a day  enoxaparin Injectable 40 milliGRAM(s) SubCutaneous daily  famotidine    Tablet 20 milliGRAM(s) Oral two times a day  mirtazapine 7.5 milliGRAM(s) Oral daily  oxyCODONE    IR 5 milliGRAM(s) Oral every 4 hours  sertraline 150 milliGRAM(s) Oral daily    MEDICATIONS  (PRN):  acetaminophen   Tablet .. 650 milliGRAM(s) Oral every 6 hours PRN Mild Pain (1 - 3)      LABS:

## 2019-08-07 NOTE — DISCHARGE NOTE PROVIDER - HOSPITAL COURSE
50y/o female presents for preop eval for scheduled laparoscopic cholecystectomy possible open on 8/6/2018.  Per pt seen in [facility] ER on 7/15/19 for severe abdominal pain and nausea.  Imaging studies done and  Dx with calculus of gallbladder.  Pt currently denies symptoms. Pt is now s/p lap lexis; doing well pain controlled. Plan for discharge home. 48y/o female presents for preop eval for scheduled laparoscopic cholecystectomy possible open on 8/6/2018.  Per pt seen in [facility] ER on 7/15/19 for severe abdominal pain and nausea.  Imaging studies done and  Dx with calculus of gallbladder.  Pt currently denies symptoms. Pt is now s/p lap lexis; doing well pain controlled. Plan for discharge home. Pt was comfortable with discharge.

## 2019-08-07 NOTE — PROGRESS NOTE ADULT - SUBJECTIVE AND OBJECTIVE BOX
ANESTHESIA POSTOP CHECK    49y Female POSTOP DAY 1 S/P     Vital Signs Last 24 Hrs  T(C): 36.7 (07 Aug 2019 06:00), Max: 36.9 (06 Aug 2019 10:35)  T(F): 98 (07 Aug 2019 06:00), Max: 98.4 (06 Aug 2019 10:35)  HR: 97 (07 Aug 2019 06:00) (77 - 106)  BP: 113/70 (07 Aug 2019 06:00) (107/69 - 127/76)  BP(mean): 87 (06 Aug 2019 17:00) (76 - 89)  RR: 17 (07 Aug 2019 06:00) (12 - 18)  SpO2: 94% (07 Aug 2019 06:00) (94% - 100%)  I&O's Summary    06 Aug 2019 07:01  -  07 Aug 2019 07:00  --------------------------------------------------------  IN: 990 mL / OUT: 900 mL / NET: 90 mL        [x ] NO APPARENT ANESTHESIA COMPLICATIONS      Comments:

## 2019-08-11 LAB — CULTURE - SURGICAL SITE: SIGNIFICANT CHANGE UP

## 2019-08-13 LAB — SURGICAL PATHOLOGY STUDY: SIGNIFICANT CHANGE UP

## 2019-10-24 PROBLEM — K80.20 CALCULUS OF GALLBLADDER WITHOUT CHOLECYSTITIS WITHOUT OBSTRUCTION: Chronic | Status: ACTIVE | Noted: 2019-08-05

## 2019-10-28 ENCOUNTER — APPOINTMENT (OUTPATIENT)
Dept: ORTHOPEDIC SURGERY | Facility: CLINIC | Age: 49
End: 2019-10-28
Payer: MEDICAID

## 2019-10-28 VITALS
SYSTOLIC BLOOD PRESSURE: 151 MMHG | DIASTOLIC BLOOD PRESSURE: 88 MMHG | HEIGHT: 63 IN | BODY MASS INDEX: 24.8 KG/M2 | HEART RATE: 88 BPM | WEIGHT: 140 LBS

## 2019-10-28 DIAGNOSIS — Z78.9 OTHER SPECIFIED HEALTH STATUS: ICD-10-CM

## 2019-10-28 PROCEDURE — 73564 X-RAY EXAM KNEE 4 OR MORE: CPT | Mod: RT

## 2019-10-28 PROCEDURE — 99214 OFFICE O/P EST MOD 30 MIN: CPT

## 2019-10-28 RX ORDER — HYLAN G-F 20 16MG/2ML
16 SYRINGE (ML) INTRAARTICULAR
Qty: 1 | Refills: 0 | Status: ACTIVE | OUTPATIENT
Start: 2019-10-28

## 2019-10-28 RX ORDER — MELOXICAM 15 MG/1
15 TABLET ORAL
Qty: 30 | Refills: 1 | Status: ACTIVE | COMMUNITY
Start: 2019-10-28 | End: 1900-01-01

## 2019-10-28 NOTE — DISCUSSION/SUMMARY
[de-identified] : Patient shown her x-rays gone over the situation in detail she understands and she has posttraumatic arthritis she is likely the main reason for her ongoing and progressive symptoms. At some point the patient would likely benefit from a total knee arthroplasty in the future.\par \par In the interim the patient should undergo a course of physical supplement injections and take Mobic as tolerated and she'll also be referred to physical therapy to address her knee restricted motion and deconditioning

## 2019-10-28 NOTE — HISTORY OF PRESENT ILLNESS
[Worsening] : worsening [___ mths] : [unfilled] month(s) ago [8] : a minimum pain level of 8/10 [10] : a maximum pain level of 10/10 [Walking] : walking [Constant] : ~He/She~ states the symptoms seem to be constant [de-identified] : Pt presents for initial visit with right knee pain. Pt has hx of tibial plateau fx 2001 treated in [place] surgically. Pt saw Dr. Moscoso 2001, Pt is taking Motrin for pain.  There is buckling.  [de-identified] : motrin

## 2019-10-28 NOTE — PHYSICAL EXAM
[de-identified] : Sedimentation of the right knee discloses well healed lateral entrance portals for trans-metaphyseal screw fixation of the tibial plateau.  The screw heads are palpable under the skin, however there is no acute swelling erythema or tenderness.\par She is noted to have medial joint line tenderness to range of motion which is restricted between 0-105° flexion. No acute instability or neurovascular deficits [de-identified] : X-rays taken of the right knee and AP lateral skyline and open notch views disclosed in situ transcondylar distal tibial plateau and cancellus screws. Evidence of a prior tibial plateau-healed\par \par There is significant medial joint line narrowing near bone-on-bone

## 2019-11-01 ENCOUNTER — RX RENEWAL (OUTPATIENT)
Age: 49
End: 2019-11-01

## 2019-11-01 RX ORDER — CYCLOBENZAPRINE HYDROCHLORIDE 10 MG/1
10 TABLET, FILM COATED ORAL
Qty: 30 | Refills: 0 | Status: ACTIVE | COMMUNITY
Start: 2019-11-01 | End: 1900-01-01

## 2020-01-25 ENCOUNTER — EMERGENCY (EMERGENCY)
Facility: HOSPITAL | Age: 50
LOS: 1 days | Discharge: HOME | End: 2020-01-25
Attending: EMERGENCY MEDICINE | Admitting: EMERGENCY MEDICINE
Payer: MEDICAID

## 2020-01-25 VITALS
HEART RATE: 104 BPM | TEMPERATURE: 98 F | RESPIRATION RATE: 18 BRPM | DIASTOLIC BLOOD PRESSURE: 76 MMHG | SYSTOLIC BLOOD PRESSURE: 130 MMHG | OXYGEN SATURATION: 100 %

## 2020-01-25 VITALS
OXYGEN SATURATION: 100 % | SYSTOLIC BLOOD PRESSURE: 136 MMHG | RESPIRATION RATE: 16 BRPM | HEART RATE: 65 BPM | DIASTOLIC BLOOD PRESSURE: 65 MMHG

## 2020-01-25 DIAGNOSIS — Z90.13 ACQUIRED ABSENCE OF BILATERAL BREASTS AND NIPPLES: Chronic | ICD-10-CM

## 2020-01-25 LAB
ALBUMIN SERPL ELPH-MCNC: 4.6 G/DL — SIGNIFICANT CHANGE UP (ref 3.3–5)
ALP SERPL-CCNC: 77 U/L — SIGNIFICANT CHANGE UP (ref 40–120)
ALT FLD-CCNC: 52 U/L — HIGH (ref 4–33)
ANION GAP SERPL CALC-SCNC: 13 MMO/L — SIGNIFICANT CHANGE UP (ref 7–14)
AST SERPL-CCNC: 39 U/L — HIGH (ref 4–32)
BASOPHILS # BLD AUTO: 0.08 K/UL — SIGNIFICANT CHANGE UP (ref 0–0.2)
BASOPHILS NFR BLD AUTO: 0.9 % — SIGNIFICANT CHANGE UP (ref 0–2)
BILIRUB SERPL-MCNC: 0.4 MG/DL — SIGNIFICANT CHANGE UP (ref 0.2–1.2)
BUN SERPL-MCNC: 10 MG/DL — SIGNIFICANT CHANGE UP (ref 7–23)
CALCIUM SERPL-MCNC: 9.6 MG/DL — SIGNIFICANT CHANGE UP (ref 8.4–10.5)
CHLORIDE SERPL-SCNC: 100 MMOL/L — SIGNIFICANT CHANGE UP (ref 98–107)
CO2 SERPL-SCNC: 26 MMOL/L — SIGNIFICANT CHANGE UP (ref 22–31)
CREAT SERPL-MCNC: 0.85 MG/DL — SIGNIFICANT CHANGE UP (ref 0.5–1.3)
EOSINOPHIL # BLD AUTO: 0.15 K/UL — SIGNIFICANT CHANGE UP (ref 0–0.5)
EOSINOPHIL NFR BLD AUTO: 1.6 % — SIGNIFICANT CHANGE UP (ref 0–6)
GLUCOSE SERPL-MCNC: 120 MG/DL — HIGH (ref 70–99)
HCG SERPL-ACNC: < 5 MIU/ML — SIGNIFICANT CHANGE UP
HCT VFR BLD CALC: 40.1 % — SIGNIFICANT CHANGE UP (ref 34.5–45)
HGB BLD-MCNC: 12.7 G/DL — SIGNIFICANT CHANGE UP (ref 11.5–15.5)
IMM GRANULOCYTES NFR BLD AUTO: 0.4 % — SIGNIFICANT CHANGE UP (ref 0–1.5)
LYMPHOCYTES # BLD AUTO: 2.29 K/UL — SIGNIFICANT CHANGE UP (ref 1–3.3)
LYMPHOCYTES # BLD AUTO: 24.6 % — SIGNIFICANT CHANGE UP (ref 13–44)
MAGNESIUM SERPL-MCNC: 1.9 MG/DL — SIGNIFICANT CHANGE UP (ref 1.6–2.6)
MCHC RBC-ENTMCNC: 25.9 PG — LOW (ref 27–34)
MCHC RBC-ENTMCNC: 31.7 % — LOW (ref 32–36)
MCV RBC AUTO: 81.7 FL — SIGNIFICANT CHANGE UP (ref 80–100)
MONOCYTES # BLD AUTO: 0.76 K/UL — SIGNIFICANT CHANGE UP (ref 0–0.9)
MONOCYTES NFR BLD AUTO: 8.2 % — SIGNIFICANT CHANGE UP (ref 2–14)
NEUTROPHILS # BLD AUTO: 5.99 K/UL — SIGNIFICANT CHANGE UP (ref 1.8–7.4)
NEUTROPHILS NFR BLD AUTO: 64.3 % — SIGNIFICANT CHANGE UP (ref 43–77)
NRBC # FLD: 0 K/UL — SIGNIFICANT CHANGE UP (ref 0–0)
PHOSPHATE SERPL-MCNC: 2.2 MG/DL — LOW (ref 2.5–4.5)
PLATELET # BLD AUTO: 253 K/UL — SIGNIFICANT CHANGE UP (ref 150–400)
PMV BLD: 10 FL — SIGNIFICANT CHANGE UP (ref 7–13)
POTASSIUM SERPL-MCNC: 3.9 MMOL/L — SIGNIFICANT CHANGE UP (ref 3.5–5.3)
POTASSIUM SERPL-SCNC: 3.9 MMOL/L — SIGNIFICANT CHANGE UP (ref 3.5–5.3)
PROT SERPL-MCNC: 8.3 G/DL — SIGNIFICANT CHANGE UP (ref 6–8.3)
RBC # BLD: 4.91 M/UL — SIGNIFICANT CHANGE UP (ref 3.8–5.2)
RBC # FLD: 15.7 % — HIGH (ref 10.3–14.5)
SODIUM SERPL-SCNC: 139 MMOL/L — SIGNIFICANT CHANGE UP (ref 135–145)
WBC # BLD: 9.31 K/UL — SIGNIFICANT CHANGE UP (ref 3.8–10.5)
WBC # FLD AUTO: 9.31 K/UL — SIGNIFICANT CHANGE UP (ref 3.8–10.5)

## 2020-01-25 PROCEDURE — 71045 X-RAY EXAM CHEST 1 VIEW: CPT | Mod: 26

## 2020-01-25 PROCEDURE — 99285 EMERGENCY DEPT VISIT HI MDM: CPT

## 2020-01-25 RX ORDER — CYCLOBENZAPRINE HYDROCHLORIDE 10 MG/1
1 TABLET, FILM COATED ORAL
Qty: 21 | Refills: 0
Start: 2020-01-25 | End: 2020-01-31

## 2020-01-25 RX ORDER — SERTRALINE 25 MG/1
150 TABLET, FILM COATED ORAL
Qty: 0 | Refills: 0 | DISCHARGE

## 2020-01-25 RX ORDER — CYCLOBENZAPRINE HYDROCHLORIDE 10 MG/1
1 TABLET, FILM COATED ORAL
Qty: 21 | Refills: 0
Start: 2020-01-25

## 2020-01-25 RX ORDER — FAMOTIDINE 10 MG/ML
1 INJECTION INTRAVENOUS
Qty: 0 | Refills: 0 | DISCHARGE

## 2020-01-25 RX ORDER — SODIUM,POTASSIUM PHOSPHATES 278-250MG
2 POWDER IN PACKET (EA) ORAL ONCE
Refills: 0 | Status: COMPLETED | OUTPATIENT
Start: 2020-01-25 | End: 2020-01-25

## 2020-01-25 RX ORDER — IPRATROPIUM/ALBUTEROL SULFATE 18-103MCG
3 AEROSOL WITH ADAPTER (GRAM) INHALATION ONCE
Refills: 0 | Status: COMPLETED | OUTPATIENT
Start: 2020-01-25 | End: 2020-01-25

## 2020-01-25 RX ORDER — CYCLOBENZAPRINE HYDROCHLORIDE 10 MG/1
1 TABLET, FILM COATED ORAL
Qty: 45 | Refills: 0
Start: 2020-01-25 | End: 2020-02-08

## 2020-01-25 RX ORDER — MORPHINE SULFATE 50 MG/1
2 CAPSULE, EXTENDED RELEASE ORAL ONCE
Refills: 0 | Status: DISCONTINUED | OUTPATIENT
Start: 2020-01-25 | End: 2020-01-25

## 2020-01-25 RX ORDER — KETOROLAC TROMETHAMINE 30 MG/ML
15 SYRINGE (ML) INJECTION ONCE
Refills: 0 | Status: DISCONTINUED | OUTPATIENT
Start: 2020-01-25 | End: 2020-01-25

## 2020-01-25 RX ADMIN — Medication 2 PACKET(S): at 15:32

## 2020-01-25 RX ADMIN — MORPHINE SULFATE 2 MILLIGRAM(S): 50 CAPSULE, EXTENDED RELEASE ORAL at 15:32

## 2020-01-25 RX ADMIN — Medication 3 MILLILITER(S): at 13:32

## 2020-01-25 RX ADMIN — MORPHINE SULFATE 2 MILLIGRAM(S): 50 CAPSULE, EXTENDED RELEASE ORAL at 13:33

## 2020-01-25 RX ADMIN — Medication 15 MILLIGRAM(S): at 14:56

## 2020-01-25 RX ADMIN — MORPHINE SULFATE 2 MILLIGRAM(S): 50 CAPSULE, EXTENDED RELEASE ORAL at 14:56

## 2020-01-25 RX ADMIN — Medication 100 MILLIGRAM(S): at 13:32

## 2020-01-25 NOTE — ED PROVIDER NOTE - NSFOLLOWUPINSTRUCTIONS_ED_ALL_ED_FT
You presented with cough. You blood work was normal and your chest X-ray was negative for infection. You physical exam was negative for compartment syndrome of your b/l legs. We have prescribed flexeril to your pharmacy for muscle spasm. Please take as prescribed. Please follow up with your primary care doctor. If you experience, shortness of breath, chest pain, palpitations or increased leg swelling, please visit your nearest Emergency Department.

## 2020-01-25 NOTE — ED PROVIDER NOTE - ATTENDING CONTRIBUTION TO CARE
48yo F PMHx R Breast ca in 2013, sciatica, OA in R knee, p/w 1 month of cough with phlegm and 2 months of b/l thigh pain.  Pt denies any leg swelling, sob, syncope, fevers, trauma to legs, h/o DVT/PE.  Says she has family also with cough with phlegm    General: Patient in no apparent distress, AAO x 3  Skin: Dry and intact  HEENT: Oral mucosa moist. No pharyngeal exudates or tonsillar enlargement  Eyes: Conjunctiva normal  Cardiac: Regular rhythm and rate. No edema. 2+ DP pulses b/l  Respiratory: Lungs scant diffuse wheezing and symmetric. No respiratory distress  Gastrointestinal: Abdomen soft, nondistended, nontender  Musculoskeletal: Moves all extremities spontaneously. no tenderness to thighs b/l, +soft compartments with no skin changes  Neurological: alert and oriented to person, place and time  Psychiatric: Cooperative    a/p  leg pains- ?muscular pains vs electrolyte abnormalities, clinically with no swelling and normal perfusion  cough- likely viral in etiology. will get cxr, nebs, tessalon perles    Will get labs

## 2020-01-25 NOTE — ED PROVIDER NOTE - NS ED ROS FT
CONSTITUTIONAL: No fever, no chills  EYES: No eye pain, no visual disturbance  Mouth: no pain in mouth, no cuts  RESPIRATORY: + cough, No sob  CARDIOVASCULAR: No CP, no palpitations  GASTROINTESTINAL: no abdominal pain, no n/v/d  GENITOURINARY: No dysuria, no hematuria  NEUROLOGICAL: No headaches, no weakness  SKIN: No itching, no rashes  MUSCULOSKELETAL: + bl leg pain, no joint swelling  PSYCHIATRY: no insomnia, no irritability

## 2020-01-25 NOTE — ED ADULT NURSE NOTE - OBJECTIVE STATEMENT
Receive pt. in ER room 12 alert and oriented x4 presenting to the ER with complaints of coughing and pain in her feet. Pt. have a history of breast cancer, bilateral mastectomy. Pt. stated " for 2 weeks I have a cough and the bottom of my feet hurt". No edema in feet, feet warm to touch. Pt. have a persistent non productive cough, no shortness of breath. Will continue to monitor.

## 2020-01-25 NOTE — ED PROVIDER NOTE - PATIENT PORTAL LINK FT
You can access the FollowMyHealth Patient Portal offered by [facility] by registering at the following website: [email or web address]. By joining [employer]’s FollowMyHealth portal, you will also be able to view your health information using other applications (apps) compatible with our system.

## 2020-01-25 NOTE — ED ADULT NURSE NOTE - ALCOHOL PRE SCREEN (AUDIT - C)
Statement Selected
Simple: Patient demonstrates quick and easy understanding/Verbalized Understanding

## 2020-01-25 NOTE — ED ADULT TRIAGE NOTE - CHIEF COMPLAINT QUOTE
states " I am having a cough for a long time and has pain in the back and in lower legs with swollen ankles. h/o left breast Ca with bilateral mastectomy and reconstruction in 2013. denies use of chemo/radiation. denies CP/SOB

## 2020-01-25 NOTE — ED PROVIDER NOTE - FAMILY HISTORY
Family history of sickle cell anemia, son     Father  Still living? Unknown  Family history of sickle cell trait in father, Age at diagnosis: Age Unknown

## 2020-01-25 NOTE — ED PROVIDER NOTE - CLINICAL SUMMARY MEDICAL DECISION MAKING FREE TEXT BOX
49F w/ hx of sciatica, OA, LT breast cancer w/ bilateral mastectomy and reconstruction in 2013, and gallstone s/p lap lexis w/ cough w/ clear sputum, x 1 month ago. Likely viral URI vs bronchitis vs PNA. F/u labs including D-dimer and CXR. Will order US b/l LE r/o DVT. Symptomatic management with duoneb and tessalon.

## 2020-01-25 NOTE — ED PROVIDER NOTE - PHYSICAL EXAMINATION
PHYSICAL EXAM:  GENERAL: NAD, lying in bed comfortably  HEAD:  Atraumatic, Normocephalic  EYES: EOMI, PERRLA, conjunctiva and sclera clear  ENT: Moist mucous membranes  NECK: Supple, No JVD  CHEST/LUNG: Clear to auscultation bilaterally; No rales, rhonchi, wheezing, or rubs. Unlabored respirations  HEART: Regular rate and rhythm; No murmurs, rubs, or gallops  ABDOMEN: Bowel sounds present; Soft, Nontender, Nondistended. No hepatomegally  EXTREMITIES:  2+ Peripheral Pulses, brisk capillary refill. No clubbing, cyanosis, or edema  NERVOUS SYSTEM:  Alert & Oriented X3, speech clear. No deficits   MSK: FROM all 4 extremities, full and equal strength  SKIN: No rashes or lesions PHYSICAL EXAM:  GENERAL: NAD, lying in bed comfortably  HEAD:  Atraumatic, Normocephalic  EYES: EOMI, PERRLA, conjunctiva and sclera clear  ENT: Moist mucous membranes  NECK: Supple, No JVD  CHEST/LUNG: wheezing in posterior lung fields. Unlabored respirations  HEART: Regular rate and rhythm; No murmurs, rubs, or gallops  ABDOMEN: Bowel sounds present; Soft, Nontender, Nondistended. No hepatomegaly  EXTREMITIES:  2+ Peripheral Pulses, brisk capillary refill. No clubbing, cyanosis, or edema  NERVOUS SYSTEM:  Alert & Oriented X3, speech clear. No deficits   MSK: No b/l thigh tenderness to palpation. FROM all 4 extremities, full and equal strength  SKIN: No rashes or lesions

## 2020-01-25 NOTE — ED PROVIDER NOTE - OBJECTIVE STATEMENT
49F w/ hx of sciatica, OA, LT breast cancer w/ bilateral mastectomy and reconstruction in 2013, and gallstone s/p lap lexis w/ cough w/ clear sputum, x 1 month ago. She endorses hot flashes. Denies f/c/h/d/n/v, weight loss, chest pain, SOB, and abdominal pain. She also has b/l thigh pain that is 8/10, constant, and achy in nature. It radiates to her legs with activity. She has hx of sciatica of her RT leg. No recent travel hx and sick contacts. Has not gotten her flu shot.    PCP- Dr. Sayda Villagomez on [facility]

## 2020-01-26 ENCOUNTER — EMERGENCY (EMERGENCY)
Facility: HOSPITAL | Age: 50
LOS: 1 days | Discharge: HOME | End: 2020-01-26
Attending: STUDENT IN AN ORGANIZED HEALTH CARE EDUCATION/TRAINING PROGRAM
Payer: MEDICAID

## 2020-01-26 ENCOUNTER — EMERGENCY (EMERGENCY)
Facility: HOSPITAL | Age: 50
LOS: 1 days | Discharge: HOME | End: 2020-01-26
Attending: EMERGENCY MEDICINE
Payer: MEDICAID

## 2020-01-26 VITALS
SYSTOLIC BLOOD PRESSURE: 126 MMHG | RESPIRATION RATE: 20 BRPM | HEART RATE: 109 BPM | TEMPERATURE: 98 F | HEIGHT: 63 IN | OXYGEN SATURATION: 98 % | DIASTOLIC BLOOD PRESSURE: 84 MMHG | WEIGHT: 139.99 LBS

## 2020-01-26 VITALS
OXYGEN SATURATION: 95 % | SYSTOLIC BLOOD PRESSURE: 106 MMHG | RESPIRATION RATE: 18 BRPM | HEART RATE: 95 BPM | DIASTOLIC BLOOD PRESSURE: 95 MMHG | TEMPERATURE: 98 F

## 2020-01-26 VITALS
HEART RATE: 88 BPM | TEMPERATURE: 98 F | RESPIRATION RATE: 18 BRPM | DIASTOLIC BLOOD PRESSURE: 80 MMHG | OXYGEN SATURATION: 99 % | SYSTOLIC BLOOD PRESSURE: 142 MMHG | HEIGHT: 63 IN

## 2020-01-26 VITALS
SYSTOLIC BLOOD PRESSURE: 136 MMHG | DIASTOLIC BLOOD PRESSURE: 84 MMHG | TEMPERATURE: 98 F | RESPIRATION RATE: 16 BRPM | OXYGEN SATURATION: 100 % | HEART RATE: 83 BPM

## 2020-01-26 DIAGNOSIS — Z90.13 ACQUIRED ABSENCE OF BILATERAL BREASTS AND NIPPLES: Chronic | ICD-10-CM

## 2020-01-26 LAB
ALBUMIN SERPL ELPH-MCNC: 4.2 G/DL — SIGNIFICANT CHANGE UP (ref 3.3–5)
ALP SERPL-CCNC: 77 U/L — SIGNIFICANT CHANGE UP (ref 40–120)
ALT FLD-CCNC: 50 U/L — HIGH (ref 10–45)
ANION GAP SERPL CALC-SCNC: 11 MMOL/L — SIGNIFICANT CHANGE UP (ref 5–17)
AST SERPL-CCNC: 39 U/L — SIGNIFICANT CHANGE UP (ref 10–40)
BASOPHILS # BLD AUTO: 0.06 K/UL — SIGNIFICANT CHANGE UP (ref 0–0.2)
BASOPHILS NFR BLD AUTO: 0.9 % — SIGNIFICANT CHANGE UP (ref 0–2)
BILIRUB SERPL-MCNC: 0.3 MG/DL — SIGNIFICANT CHANGE UP (ref 0.2–1.2)
BUN SERPL-MCNC: 12 MG/DL — SIGNIFICANT CHANGE UP (ref 7–23)
CALCIUM SERPL-MCNC: 9.2 MG/DL — SIGNIFICANT CHANGE UP (ref 8.4–10.5)
CHLORIDE SERPL-SCNC: 101 MMOL/L — SIGNIFICANT CHANGE UP (ref 96–108)
CO2 SERPL-SCNC: 27 MMOL/L — SIGNIFICANT CHANGE UP (ref 22–31)
CREAT SERPL-MCNC: 0.89 MG/DL — SIGNIFICANT CHANGE UP (ref 0.5–1.3)
EOSINOPHIL # BLD AUTO: 0.29 K/UL — SIGNIFICANT CHANGE UP (ref 0–0.5)
EOSINOPHIL NFR BLD AUTO: 4.3 % — SIGNIFICANT CHANGE UP (ref 0–6)
FLU A RESULT: NOT DETECTED — SIGNIFICANT CHANGE UP
FLU A RESULT: NOT DETECTED — SIGNIFICANT CHANGE UP
FLUAV AG NPH QL: NOT DETECTED — SIGNIFICANT CHANGE UP
FLUBV AG NPH QL: NOT DETECTED — SIGNIFICANT CHANGE UP
GLUCOSE SERPL-MCNC: 104 MG/DL — HIGH (ref 70–99)
HCT VFR BLD CALC: 38.5 % — SIGNIFICANT CHANGE UP (ref 34.5–45)
HGB BLD-MCNC: 11.9 G/DL — SIGNIFICANT CHANGE UP (ref 11.5–15.5)
IMM GRANULOCYTES NFR BLD AUTO: 0.4 % — SIGNIFICANT CHANGE UP (ref 0–1.5)
LYMPHOCYTES # BLD AUTO: 2.13 K/UL — SIGNIFICANT CHANGE UP (ref 1–3.3)
LYMPHOCYTES # BLD AUTO: 31.9 % — SIGNIFICANT CHANGE UP (ref 13–44)
MAGNESIUM SERPL-MCNC: 2 MG/DL — SIGNIFICANT CHANGE UP (ref 1.6–2.6)
MCHC RBC-ENTMCNC: 25.2 PG — LOW (ref 27–34)
MCHC RBC-ENTMCNC: 30.9 GM/DL — LOW (ref 32–36)
MCV RBC AUTO: 81.6 FL — SIGNIFICANT CHANGE UP (ref 80–100)
MONOCYTES # BLD AUTO: 0.66 K/UL — SIGNIFICANT CHANGE UP (ref 0–0.9)
MONOCYTES NFR BLD AUTO: 9.9 % — SIGNIFICANT CHANGE UP (ref 2–14)
NEUTROPHILS # BLD AUTO: 3.51 K/UL — SIGNIFICANT CHANGE UP (ref 1.8–7.4)
NEUTROPHILS NFR BLD AUTO: 52.6 % — SIGNIFICANT CHANGE UP (ref 43–77)
NRBC # BLD: 0 /100 WBCS — SIGNIFICANT CHANGE UP (ref 0–0)
PHOSPHATE SERPL-MCNC: 3.4 MG/DL — SIGNIFICANT CHANGE UP (ref 2.5–4.5)
PLATELET # BLD AUTO: 212 K/UL — SIGNIFICANT CHANGE UP (ref 150–400)
POTASSIUM SERPL-MCNC: 4.4 MMOL/L — SIGNIFICANT CHANGE UP (ref 3.5–5.3)
POTASSIUM SERPL-SCNC: 4.4 MMOL/L — SIGNIFICANT CHANGE UP (ref 3.5–5.3)
PROT SERPL-MCNC: 7.7 G/DL — SIGNIFICANT CHANGE UP (ref 6–8.3)
RBC # BLD: 4.72 M/UL — SIGNIFICANT CHANGE UP (ref 3.8–5.2)
RBC # FLD: 15.8 % — HIGH (ref 10.3–14.5)
RSV RESULT: SIGNIFICANT CHANGE UP
RSV RNA RESP QL NAA+PROBE: SIGNIFICANT CHANGE UP
SODIUM SERPL-SCNC: 139 MMOL/L — SIGNIFICANT CHANGE UP (ref 135–145)
WBC # BLD: 6.68 K/UL — SIGNIFICANT CHANGE UP (ref 3.8–10.5)
WBC # FLD AUTO: 6.68 K/UL — SIGNIFICANT CHANGE UP (ref 3.8–10.5)

## 2020-01-26 PROCEDURE — 96374 THER/PROPH/DIAG INJ IV PUSH: CPT

## 2020-01-26 PROCEDURE — 93970 EXTREMITY STUDY: CPT | Mod: 26

## 2020-01-26 PROCEDURE — 85027 COMPLETE CBC AUTOMATED: CPT

## 2020-01-26 PROCEDURE — 80053 COMPREHEN METABOLIC PANEL: CPT

## 2020-01-26 PROCEDURE — 82550 ASSAY OF CK (CPK): CPT

## 2020-01-26 PROCEDURE — 99284 EMERGENCY DEPT VISIT MOD MDM: CPT

## 2020-01-26 PROCEDURE — 73562 X-RAY EXAM OF KNEE 3: CPT

## 2020-01-26 PROCEDURE — 84100 ASSAY OF PHOSPHORUS: CPT

## 2020-01-26 PROCEDURE — 99284 EMERGENCY DEPT VISIT MOD MDM: CPT | Mod: 25

## 2020-01-26 PROCEDURE — 83735 ASSAY OF MAGNESIUM: CPT

## 2020-01-26 PROCEDURE — 93970 EXTREMITY STUDY: CPT

## 2020-01-26 PROCEDURE — 73562 X-RAY EXAM OF KNEE 3: CPT | Mod: 26,LT,RT

## 2020-01-26 RX ORDER — ACETAMINOPHEN 500 MG
650 TABLET ORAL ONCE
Refills: 0 | Status: COMPLETED | OUTPATIENT
Start: 2020-01-26 | End: 2020-01-26

## 2020-01-26 RX ORDER — KETOROLAC TROMETHAMINE 30 MG/ML
15 SYRINGE (ML) INJECTION ONCE
Refills: 0 | Status: DISCONTINUED | OUTPATIENT
Start: 2020-01-26 | End: 2020-01-26

## 2020-01-26 RX ORDER — OXYCODONE HYDROCHLORIDE 5 MG/1
5 TABLET ORAL ONCE
Refills: 0 | Status: DISCONTINUED | OUTPATIENT
Start: 2020-01-26 | End: 2020-01-26

## 2020-01-26 RX ORDER — OXYCODONE HYDROCHLORIDE 5 MG/1
1 TABLET ORAL
Qty: 9 | Refills: 0
Start: 2020-01-26

## 2020-01-26 RX ORDER — SODIUM CHLORIDE 9 MG/ML
1000 INJECTION INTRAMUSCULAR; INTRAVENOUS; SUBCUTANEOUS ONCE
Refills: 0 | Status: COMPLETED | OUTPATIENT
Start: 2020-01-26 | End: 2020-01-26

## 2020-01-26 RX ORDER — LIDOCAINE 4 G/100G
2 CREAM TOPICAL ONCE
Refills: 0 | Status: COMPLETED | OUTPATIENT
Start: 2020-01-26 | End: 2020-01-26

## 2020-01-26 RX ORDER — CYCLOBENZAPRINE HYDROCHLORIDE 10 MG/1
5 TABLET, FILM COATED ORAL ONCE
Refills: 0 | Status: COMPLETED | OUTPATIENT
Start: 2020-01-26 | End: 2020-01-26

## 2020-01-26 RX ADMIN — CYCLOBENZAPRINE HYDROCHLORIDE 5 MILLIGRAM(S): 10 TABLET, FILM COATED ORAL at 10:17

## 2020-01-26 RX ADMIN — SODIUM CHLORIDE 1000 MILLILITER(S): 9 INJECTION INTRAMUSCULAR; INTRAVENOUS; SUBCUTANEOUS at 08:23

## 2020-01-26 RX ADMIN — LIDOCAINE 2 PATCH: 4 CREAM TOPICAL at 18:57

## 2020-01-26 RX ADMIN — Medication 15 MILLIGRAM(S): at 08:23

## 2020-01-26 RX ADMIN — Medication 650 MILLIGRAM(S): at 18:57

## 2020-01-26 RX ADMIN — Medication 15 MILLIGRAM(S): at 18:57

## 2020-01-26 RX ADMIN — OXYCODONE HYDROCHLORIDE 5 MILLIGRAM(S): 5 TABLET ORAL at 18:59

## 2020-01-26 NOTE — ED PROVIDER NOTE - CLINICAL SUMMARY MEDICAL DECISION MAKING FREE TEXT BOX
Patient presenting cough, leg pain. Evaluated in ED yesterday. Will obtain additional workup including imaging, reassess

## 2020-01-26 NOTE — ED PROVIDER NOTE - PATIENT PORTAL LINK FT
You can access the FollowMyHealth Patient Portal offered by [facility] by registering at the following website: [email or web address]. By joining [employer]’s FollowMyHealth portal, you will also be able to view your health information using other applications (apps) compatible with our system.

## 2020-01-26 NOTE — ED PROVIDER NOTE - PMH
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 No surgery  Sciatica    Sickle cell trait

## 2020-01-26 NOTE — ED ADULT NURSE NOTE - NSIMPLEMENTINTERV_GEN_ALL_ED
Implemented All Universal Safety Interventions:  [place] to call system. Call bell, personal items and telephone within reach. Instruct patient to call for assistance. Room bathroom lighting operational. Non-slip footwear when patient is off stretcher. Physically safe environment: no spills, clutter or unnecessary equipment. Stretcher in lowest position, wheels locked, appropriate side rails in place.

## 2020-01-26 NOTE — ED PROVIDER NOTE - ATTENDING CONTRIBUTION TO CARE
Private Physician Sayda Villagomez PCP [place]  49y female pmh Anxiety, Breast ca sp shalonda mastectomy no chemo/RT, SP lexis, Sickle Trait, Denies HTN,HLD,CVA,CAD/Mi,Habits/Travel. Pt comes to ed complains of cough past 2wk, +ill contacts (cough has been going around house). PT was seen yesterday [facility]/ED for leg pain and cough  and dc home. Now returns with left leg pain. no fever chills shortness of breath, cp.abd pain, nvdc, PE WDWN female awake alert mild cough, NCAT neck supple chest clear anterior & posterior abd soft +bs no mass guarding, neuro no focal defects, cv no rubs, gallops or murmurs, MSK left leg slightly swollen compared to rt, Knees full rom, no swelling warmth,tenderness. b/l feet no swelling tender warmth, good dp pulse.   Yousuf Nevarez MD, Fac[facility]

## 2020-01-26 NOTE — ED ADULT NURSE NOTE - OBJECTIVE STATEMENT
Pt brought to spot 27--pt c/o rt knee pain and some lt knee pain--pt given meds as per MD--pt appears in no acute distress at this time  Pt given meds

## 2020-01-26 NOTE — ED PROVIDER NOTE - CLINICAL SUMMARY MEDICAL DECISION MAKING FREE TEXT BOX
49F w/ hx of sciatica, OA, LT breast cancer w/ bilateral mastectomy and reconstruction in 2013, and gallstone s/p lap lexis with prior right knee meniscal repair and known arthritis p/w progressive arthritis. As per prior note patient has had recent lab studies, including imaging (xray/vascular duplex) all of which where within normal limits, currently she does not display any change in exam however continues to complain of knee pain. Currently no acute signs of infection or trauma, plan for supportive management and dispo pending results.

## 2020-01-26 NOTE — ED PROVIDER NOTE - PROGRESS NOTE DETAILS
Beverley Trotter PGY2  pain reassessed, mildly improved with medications. flu swab neg. will dc with pmd and ortho f/u and pain control. patient able to ambulate without assist.

## 2020-01-26 NOTE — ED ADULT NURSE NOTE - OBJECTIVE STATEMENT
49 y F presents to the ED with coughing and R knee pain. Pt reports that pain feels like metal is grinding/rubbing. Pt states that pain feels like her normal arthritis pain but it has gone on for too long. Pt reports that she went to [facility] yesterday. Pt reports that a bunch of people in her family are sick and the cold keeps being passed around. Pt denies any fevers, running nose, watery eyes. On assessment, A&Ox4. Denies lightheadedness, dizziness, headaches, numbness and tingling. Breathing spontaneously and unlabored. Lungs bilaterally CTA. Sating 100% on Room air. Denies SOB and CP. S1 and S2 heard. No Peripheral edema. Cap refill 2s. No JVD. Peripheral pulses strong and equal bilaterally. Denies CP, SOB and palpitations. Abdomen soft, nontender, nondistended, negative CVA tenderness, positive bowel sounds in all four quadrants. Pt is continent. Denies n/v/d, dysuria, melena and hematuria. Pt has good ROM in both legs.  Pt reports increased pain when R Knee is bent all the way up next to her stomach. Pt denies any recent trauma and states that her knee looks normal for her. No swelling, redness, or abrasions are present. Seen and evaluated by MD. Awaiting dispo. 49 y F presents to the ED with coughing and R knee pain. Pt reports that pain feels like metal is grinding/rubbing. Pt states that pain feels like her normal arthritis pain but it has gone on for too long. Pt reports that she went to [facility] yesterday. Pt reports that a bunch of people in her family are sick and the cold keeps being passed around. Pt denies any fevers, running nose, watery eyes. On assessment, A&Ox4. Denies lightheadedness, dizziness, headaches, numbness and tingling. Breathing spontaneously and unlabored. Lungs bilaterally CTA. Sating 100% on Room air. Denies SOB and CP. S1 and S2 heard. No Peripheral edema. Cap refill 2s. No JVD. Peripheral pulses strong and equal bilaterally. Denies CP, SOB and palpitations. Abdomen soft, nontender, nondistended, negative CVA tenderness, positive bowel sounds in all four quadrants. Pt is continent. Denies n/v/d, dysuria, melena and hematuria. Pt has good ROM in both legs.  Pt reports increased pain when R Knee is bent all the way up next to her stomach. Pt denies any recent trauma and states that her knee looks normal for her. mild swelling in R knee,  no redness, or abrasions are present. Seen and evaluated by MD. Awaiting dispo.

## 2020-01-26 NOTE — ED PROVIDER NOTE - NSFOLLOWUPINSTRUCTIONS_ED_ALL_ED_FT
1. A copy of any of your resulted labs, imaging, and findings have been provided and discussed with you.   2. Follow up with your primary care doctor within 48 hours to assess the symptoms you were seen for in the emergency department and to review all results from your visit. If you don't have a doctor, call [phone] to make an appointment.  3. Take ibuprofen 600mg every 6 hours as needed for pain. Can also take oxycodone as prescribed as needed for severe pain   4. Return immediately to the emergency department for new, persistent, or worsening symptoms or signs.

## 2020-01-26 NOTE — ED PROVIDER NOTE - OBJECTIVE STATEMENT
49F with pmh sciatica, OA, LT breast cancer w/ bilateral mastectomy and reconstruction in 2013 presenting with 1 month hx dry cough and left thigh pain x 3 weeks. Endorses chronic sciatica and right leg pain 2/2 arthritis but left leg discomfort is new. Seen in ED yesterday with labs with mild low phosphorus otherwise negative workup. Patient states felt a bit better after leaving but pain came back. Endorses had some feet swelling last week, took over the counter diruetic with last dose 3 days ago. Denies fever, chills, cp, sob, n/v/d

## 2020-01-26 NOTE — ED ADULT TRIAGE NOTE - CHIEF COMPLAINT QUOTE
Ambulatory to triage, complains of harsh dry cough, and bilateral thigh pain.  Was seen yesterday in ED for same complaints.  Discharged with flexeril & oxycodone without relief.  Hx Breast CA, bilateral mastectomy (R limb restrict band in place), Osteoarthritis, Sciatica.

## 2020-01-26 NOTE — ED ADULT NURSE REASSESSMENT NOTE - NS ED NURSE REASSESS COMMENT FT1
Pt remains in the ED. Resting comfortably in bed. A&Ox3. Breathing spontaneously and unlabored. NAD. VSS. Pt reports that the pain medication has helped. Will continue to monitor. Awaiting dispo.

## 2020-01-26 NOTE — ED ADULT NURSE NOTE - NSIMPLEMENTINTERV_GEN_ALL_ED
Implemented All Universal Safety Interventions:  [place] to call system. Call bell, personal items and telephone within reach. Instruct patient to call for assistance. Room bathroom lighting operational. Non-slip footwear when patient is off stretcher. Physically safe environment: no spills, clutter or unnecessary equipment. Stretcher in lowest position, wheels locked, appropriate side rails in place.

## 2020-01-26 NOTE — ED PROVIDER NOTE - ATTENDING CONTRIBUTION TO CARE
49F w/ hx of sciatica, OA, LT breast cancer w/ bilateral mastectomy and reconstruction in 2013, and gallstone s/p lap lexis with prior right knee meniscal repair and known arthritis p/w progressive arthritis. As per prior note patient has had recent lab studies, including imaging (xray/vascular duplex) all of which where within normal limits, currently she does not display any change in exam however continues to complain of knee pain. Currently no acute signs of infection or trauma, plan for supportive management and dispo pending results.    JESUS Miller: I have personally performed a face to face bedside history and physical examination of this patient. I have discussed the history, examination, review of systems, assessment and plan of management with the resident. I have reviewed the electronic medical record and amended it to reflect my history, review of systems, physical exam, assessment and plan.

## 2020-01-26 NOTE — ED PROVIDER NOTE - NSFOLLOWUPINSTRUCTIONS_ED_ALL_ED_FT
Activities as tolerated. Please encourage good oral and fluid intake. For pain, please take Motrin 400mg every 4 hours as needed, or Tylenol 650mg every 6 hours as needed. For severe pain, please take your oxycodone and flexeril as directed.     Please see your primary care doctor within 24-48 hours for further management of your symptoms.  Please follow up with your orthopedic doctor this week for further evaluation.    Please seek emergent medical management if you have any worsening signs or symptoms, such as recurrent falls, inability to walk, chest pain, difficulty breathing, loss of consciousness, or persistent vomiting.

## 2020-01-26 NOTE — ED PROVIDER NOTE - PATIENT PORTAL LINK FT
You can access the FollowMyHealth Patient Portal offered by [facility] by registering at the following website: [email or web address]. By joining [employer]’s FollowMyHealth portal, you will also be able to view your health information using other applications (apps) compatible with our system.

## 2020-01-26 NOTE — ED PROVIDER NOTE - NSFOLLOWUPCLINICS_GEN_ALL_ED_FT
[facility]  Orthopedic Surgery  [address], 3rd & 4th [place] [address]  Phone: [phone]  Fax:   Follow Up Time: Routine    [facility]  Orthopedic Surgery  [address] [address]  Phone: [phone]  Fax:   Follow Up Time: Routine

## 2020-01-26 NOTE — ED PROVIDER NOTE - OBJECTIVE STATEMENT
49F w/ hx of sciatica, OA, LT breast cancer w/ bilateral mastectomy and reconstruction in 2013, and gallstone s/p lap lexis, with 2 recent presentations including a cough (CXR clear, labs) and a presentation yesterday for lower extremity pain. The patient denies any associated traumatic injury. She states that she takes an OTC diuretic but denies any associated f/c/n/v/d/c.

## 2020-01-26 NOTE — ED PROVIDER NOTE - PHYSICAL EXAMINATION
GEN: NAD, awake, well appearing  HEENT: NCAT, MMM, normal conjunctiva, perrl  CHEST/LUNGS: Non-tachypneic, CTAB, bilateral breath sounds  CARDIAC: Non-tachycardic, s1s2, normal perfusion, no peripheral edema  ABDOMEN: Soft, NTND, No rebound/guarding  MSK: No joint tenderness, FROM all extremities, no gross deformity of extremities  SKIN: No rashes, no petechiae, no vesicles  NEURO: CN grossly intact, normal coordination, no focal motor or sensory deficits  PSYCH: Alert, appropriate, cooperative

## 2020-01-27 PROBLEM — M54.30 SCIATICA, UNSPECIFIED SIDE: Chronic | Status: ACTIVE | Noted: 2020-01-26

## 2020-01-27 PROBLEM — M19.90 UNSPECIFIED OSTEOARTHRITIS, UNSPECIFIED SITE: Chronic | Status: ACTIVE | Noted: 2020-01-26

## 2020-01-31 ENCOUNTER — APPOINTMENT (OUTPATIENT)
Dept: ORTHOPEDIC SURGERY | Facility: CLINIC | Age: 50
End: 2020-01-31
Payer: MEDICAID

## 2020-01-31 VITALS
HEART RATE: 93 BPM | SYSTOLIC BLOOD PRESSURE: 130 MMHG | DIASTOLIC BLOOD PRESSURE: 88 MMHG | HEIGHT: 63 IN | WEIGHT: 140 LBS | BODY MASS INDEX: 24.8 KG/M2

## 2020-01-31 DIAGNOSIS — M12.561 TRAUMATIC ARTHROPATHY, RIGHT KNEE: ICD-10-CM

## 2020-01-31 PROCEDURE — 99215 OFFICE O/P EST HI 40 MIN: CPT

## 2020-01-31 RX ORDER — TRAMADOL HYDROCHLORIDE 50 MG/1
50 TABLET, COATED ORAL
Qty: 40 | Refills: 2 | Status: ACTIVE | COMMUNITY
Start: 2020-01-31 | End: 1900-01-01

## 2020-01-31 NOTE — HISTORY OF PRESENT ILLNESS
[Worsening] : worsening [___ mths] : [unfilled] month(s) ago [7] : a maximum pain level of 7/10 [Constant] : ~He/She~ states the symptoms seem to be constant [Walking] : worsened by walking [Rest] : relieved by rest [de-identified] : Flexeril oxycodone [de-identified] : Pt presents for follow up for pain in her right knee. Pt was seen in ER 12/26/20 for pain   in her right knee, she was given oxycodone and Flexeril for pain relief. Pt is wearing a pain patch over her right thigh. Pt has not started her physical therapy as prescribed. Insurance rejected the gel injections.

## 2020-01-31 NOTE — PHYSICAL EXAM
[de-identified] : The pain is noted to have significant pain on attempted range of motion which is limited between 10 and 95° flexion a mild effusion is noted [de-identified] : The x-rays were again reviewed with the patient disclosing near bone-on-bone medial joint space narrowing along with tricompartmental arthritic changes

## 2020-01-31 NOTE — DISCUSSION/SUMMARY
[de-identified] : the patient is considerably more symptomatic at this time and is requesting further Specific treatment. The options were again spelled out to the patient however she would like to proceed with total knee arthroplasty at this time Given her degree of arthritic joint space narrowing and continued symptoms.A lengthy discussion to place pertaining to the procedure\par Aside from being seen and evaluated for her knee, the patient underwent a lengthy face to face discussion pertaining to total knee arthroplasty.  This included instructions and information about the pre-operative preparation as well as information pertaining to the hospitalization and post-operative care and rehabilitation.\par \par The patient was also made aware of the potential risks and possible complications as it pertains to total knee arthroplasty as well as the general surgical and anesthesia risks and complications.  This includes, but is not limited to, possible wound infection, cardio-pulmonary problems such as DVT, fat embolism and PE (potentially fatal), soft tissue swelling, stiffness and fibrosis, skin slough, muscle or nerve injury, compartment syndrome, blood transfusion reaction/infection. Problems with the knee components include possible loosening or wearing-out which would require revision surgery.\par \par Patient specific instruments may also be utilized, if applicable, to help achieve more optimal implant alignment, customized to your unique knee anatomy. MRI (Magnetic Resonance Images) and X-Ray images of your affected leg are scanned into an advanced web-based software program, which will generate virtual images of your knee which are read and reviewed and ultimately approved by me. Surgical instruments and guides are then designed and built, mapping out specific bone cuts to accurately align the implants to your knee while performing the surgery. Time under anesthesia may be reduced, which may also lead to less blood loss and a lower risk of infection.\par \par Revision surgery or repeat arthrotomy may also be required for deep infections, and in extreme cases, the implants may be removed either temporarily (staged procedures) or permanently (resulting in knee fusion or even amputation in extreme cases). \par \par The patient understands the above discussion and has been given ample time to ask any other questions or address any concerns pertaining to the purposed surgery.  They are also aware that our office staff, specifically our surgical coordinator will continue to be available to guide and instruct the patient during the perioperative phase, as well as answer or relay any other questions that may arise.\par \par Counseling/Coordination of Care: Greater than 50% of the encounter time was spent on counseling and coordination of care for total knee arthroplasty and I have spent at least 45 minutes of face to face time with the patient.

## 2020-02-18 ENCOUNTER — OUTPATIENT (OUTPATIENT)
Dept: OUTPATIENT SERVICES | Facility: HOSPITAL | Age: 50
LOS: 1 days | End: 2020-02-18

## 2020-02-18 VITALS
TEMPERATURE: 98 F | WEIGHT: 147.93 LBS | HEIGHT: 63 IN | DIASTOLIC BLOOD PRESSURE: 84 MMHG | OXYGEN SATURATION: 99 % | RESPIRATION RATE: 14 BRPM | SYSTOLIC BLOOD PRESSURE: 120 MMHG | HEART RATE: 82 BPM

## 2020-02-18 DIAGNOSIS — M19.90 UNSPECIFIED OSTEOARTHRITIS, UNSPECIFIED SITE: ICD-10-CM

## 2020-02-18 DIAGNOSIS — M17.11 UNILATERAL PRIMARY OSTEOARTHRITIS, RIGHT KNEE: ICD-10-CM

## 2020-02-18 DIAGNOSIS — Z90.49 ACQUIRED ABSENCE OF OTHER SPECIFIED PARTS OF DIGESTIVE TRACT: Chronic | ICD-10-CM

## 2020-02-18 DIAGNOSIS — Z90.13 ACQUIRED ABSENCE OF BILATERAL BREASTS AND NIPPLES: Chronic | ICD-10-CM

## 2020-02-18 LAB
ALBUMIN SERPL ELPH-MCNC: 4 G/DL — SIGNIFICANT CHANGE UP (ref 3.3–5)
ALP SERPL-CCNC: 78 U/L — SIGNIFICANT CHANGE UP (ref 40–120)
ALT FLD-CCNC: 29 U/L — SIGNIFICANT CHANGE UP (ref 4–33)
ANION GAP SERPL CALC-SCNC: 13 MMO/L — SIGNIFICANT CHANGE UP (ref 7–14)
APPEARANCE UR: CLEAR — SIGNIFICANT CHANGE UP
APTT BLD: 30.1 SEC — SIGNIFICANT CHANGE UP (ref 27.5–36.3)
AST SERPL-CCNC: 28 U/L — SIGNIFICANT CHANGE UP (ref 4–32)
BACTERIA # UR AUTO: SIGNIFICANT CHANGE UP
BILIRUB SERPL-MCNC: 0.2 MG/DL — SIGNIFICANT CHANGE UP (ref 0.2–1.2)
BILIRUB UR-MCNC: NEGATIVE — SIGNIFICANT CHANGE UP
BLD GP AB SCN SERPL QL: NEGATIVE — SIGNIFICANT CHANGE UP
BLOOD UR QL VISUAL: NEGATIVE — SIGNIFICANT CHANGE UP
BUN SERPL-MCNC: 8 MG/DL — SIGNIFICANT CHANGE UP (ref 7–23)
CALCIUM SERPL-MCNC: 9.1 MG/DL — SIGNIFICANT CHANGE UP (ref 8.4–10.5)
CHLORIDE SERPL-SCNC: 103 MMOL/L — SIGNIFICANT CHANGE UP (ref 98–107)
CO2 SERPL-SCNC: 26 MMOL/L — SIGNIFICANT CHANGE UP (ref 22–31)
COD CRY URNS QL: SIGNIFICANT CHANGE UP (ref 0–0)
COLOR SPEC: YELLOW — SIGNIFICANT CHANGE UP
CREAT SERPL-MCNC: 0.75 MG/DL — SIGNIFICANT CHANGE UP (ref 0.5–1.3)
GLUCOSE SERPL-MCNC: 83 MG/DL — SIGNIFICANT CHANGE UP (ref 70–99)
GLUCOSE UR-MCNC: NEGATIVE — SIGNIFICANT CHANGE UP
HBA1C BLD-MCNC: 5.2 % — SIGNIFICANT CHANGE UP (ref 4–5.6)
HCT VFR BLD CALC: 38.1 % — SIGNIFICANT CHANGE UP (ref 34.5–45)
HGB BLD-MCNC: 12.1 G/DL — SIGNIFICANT CHANGE UP (ref 11.5–15.5)
HYALINE CASTS # UR AUTO: NEGATIVE — SIGNIFICANT CHANGE UP
INR BLD: 1.06 — SIGNIFICANT CHANGE UP (ref 0.88–1.17)
KETONES UR-MCNC: NEGATIVE — SIGNIFICANT CHANGE UP
LEUKOCYTE ESTERASE UR-ACNC: NEGATIVE — SIGNIFICANT CHANGE UP
MCHC RBC-ENTMCNC: 26.1 PG — LOW (ref 27–34)
MCHC RBC-ENTMCNC: 31.8 % — LOW (ref 32–36)
MCV RBC AUTO: 82.1 FL — SIGNIFICANT CHANGE UP (ref 80–100)
NITRITE UR-MCNC: NEGATIVE — SIGNIFICANT CHANGE UP
NRBC # FLD: 0 K/UL — SIGNIFICANT CHANGE UP (ref 0–0)
PH UR: 6.5 — SIGNIFICANT CHANGE UP (ref 5–8)
PLATELET # BLD AUTO: 302 K/UL — SIGNIFICANT CHANGE UP (ref 150–400)
PMV BLD: 10.4 FL — SIGNIFICANT CHANGE UP (ref 7–13)
POTASSIUM SERPL-MCNC: 3.8 MMOL/L — SIGNIFICANT CHANGE UP (ref 3.5–5.3)
POTASSIUM SERPL-SCNC: 3.8 MMOL/L — SIGNIFICANT CHANGE UP (ref 3.5–5.3)
PROT SERPL-MCNC: 7.4 G/DL — SIGNIFICANT CHANGE UP (ref 6–8.3)
PROT UR-MCNC: 30 — SIGNIFICANT CHANGE UP
PROTHROM AB SERPL-ACNC: 11.8 SEC — SIGNIFICANT CHANGE UP (ref 9.8–13.1)
RBC # BLD: 4.64 M/UL — SIGNIFICANT CHANGE UP (ref 3.8–5.2)
RBC # FLD: 14.5 % — SIGNIFICANT CHANGE UP (ref 10.3–14.5)
RBC CASTS # UR COMP ASSIST: SIGNIFICANT CHANGE UP (ref 0–?)
RH IG SCN BLD-IMP: POSITIVE — SIGNIFICANT CHANGE UP
SODIUM SERPL-SCNC: 142 MMOL/L — SIGNIFICANT CHANGE UP (ref 135–145)
SP GR SPEC: 1.02 — SIGNIFICANT CHANGE UP (ref 1–1.04)
SQUAMOUS # UR AUTO: SIGNIFICANT CHANGE UP
UROBILINOGEN FLD QL: NORMAL — SIGNIFICANT CHANGE UP
WBC # BLD: 7.06 K/UL — SIGNIFICANT CHANGE UP (ref 3.8–10.5)
WBC # FLD AUTO: 7.06 K/UL — SIGNIFICANT CHANGE UP (ref 3.8–10.5)
WBC UR QL: SIGNIFICANT CHANGE UP (ref 0–?)

## 2020-02-18 RX ORDER — SODIUM CHLORIDE 9 MG/ML
3 INJECTION INTRAMUSCULAR; INTRAVENOUS; SUBCUTANEOUS EVERY 8 HOURS
Refills: 0 | Status: DISCONTINUED | OUTPATIENT
Start: 2020-03-05 | End: 2020-03-06

## 2020-02-18 RX ORDER — MIRTAZAPINE 45 MG/1
7.5 TABLET, ORALLY DISINTEGRATING ORAL
Qty: 0 | Refills: 0 | DISCHARGE

## 2020-02-18 NOTE — H&P PST ADULT - GIT GEN HX ROS MEA POS PC
abdominal pain/nausea/h/o radiates to back, was seen in [facility] ER on 7/15/2019.  Refer to hpi

## 2020-02-18 NOTE — H&P PST ADULT - NEGATIVE GENERAL GENITOURINARY SYMPTOMS
normal urinary frequency/no hematuria/no dysuria/no flank pain L/no renal colic/no flank pain R no dysuria/normal urinary frequency/no hematuria/no flank pain L/no flank pain R/no bladder infections

## 2020-02-18 NOTE — H&P PST ADULT - NEGATIVE FEMALE-SPECIFIC SYMPTOMS
no abnormal vaginal bleeding/no pelvic pain/no irregular menses no pelvic pain/no abnormal vaginal bleeding

## 2020-02-18 NOTE — H&P PST ADULT - LYMPHATIC
posterior cervical R/posterior cervical L/anterior cervical R/supraclavicular R/supraclavicular L/anterior cervical L

## 2020-02-18 NOTE — H&P PST ADULT - NSICDXPASTMEDICALHX_GEN_ALL_CORE_FT
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

## 2020-02-18 NOTE — H&P PST ADULT - GASTROINTESTINAL DETAILS
no distention/soft/bowel sounds normal/nontender/no masses palpable no rigidity/soft/nontender/no masses palpable/bowel sounds normal/no bruit/no guarding/no organomegaly/no rebound tenderness/no distention

## 2020-02-18 NOTE — H&P PST ADULT - NEGATIVE GENERAL SYMPTOMS
no chills/no sweating/no fever/no fatigue no malaise/no fever/no chills/no anorexia/no weight loss/no polyphagia/no fatigue/no sweating/no weight gain/no polyuria/no polydipsia

## 2020-02-18 NOTE — H&P PST ADULT - HISTORY OF PRESENT ILLNESS
50y/o female scheduled for removal of hardware right total knee replacement.  Pt states, "hx right tibia fx 2000 screw placed, right knee pain for many yrs, worse over the past 2 months, xray shows bone on bone."

## 2020-02-18 NOTE — H&P PST ADULT - RS GEN PE MLT RESP DETAILS PC
clear to auscultation bilaterally/respirations non-labored/breath sounds equal respirations non-labored/breath sounds equal/good air movement/clear to auscultation bilaterally/no chest wall tenderness/no intercostal retractions/no rhonchi/no rales/no wheezes

## 2020-02-18 NOTE — H&P PST ADULT - NSICDXFAMILYHX_GEN_ALL_CORE_FT
FAMILY HISTORY:  Family history of sickle cell anemia, son  Family history of sickle cell trait in father FAMILY HISTORY:  Family history of sickle cell anemia, son    Father  Still living? Unknown  Family history of sickle cell trait in father, Age at diagnosis: Age Unknown

## 2020-02-18 NOTE — H&P PST ADULT - NSICDXPASTSURGICALHX_GEN_ALL_CORE_FT
PAST SURGICAL HISTORY:  History of  Section '  and 2007    History of mastectomy, bilateral 2013 with NIKITA    Normal vaginal delivery /  /      S/P knee surgery ' :  Right Knee Arthroscopy    S/P tubal ligation '  PAST SURGICAL HISTORY:  History of  Section '  and 2007    History of mastectomy, bilateral 2013 with NIKITA    Normal vaginal delivery /      S/P cholecystectomy 2019    S/P knee surgery ' :  Right Knee Arthroscopy    S/P tubal ligation 2007

## 2020-02-18 NOTE — H&P PST ADULT - NSICDXPROBLEM_GEN_ALL_CORE_FT
PROBLEM DIAGNOSES  Problem: Osteoarthritis  Assessment and Plan: Pt scheduled for removal of hardware righ ttotal kne replacment on 3/5/2020.  labs done results pending.  Hibilclens provided:  verbal and written instructions given with teach back, able to verbalize understanding.  Pt scheduled for medical eval as per surgeon, pst not requesting.  Preop teaching done, pt able to verbalize understanding.   meds day of surgery pepcid

## 2020-02-18 NOTE — H&P PST ADULT - VENOUS THROMBOEMBOLISM CURRENT STATUS
(2) malignancy (present or previous) (2) malignancy (present or previous)/(1) other risk factor (includes escalating BMI, pack-years of smoking, diabetes requiring insulin, chemotherapy, female gender and length of surgery)

## 2020-02-19 LAB
BACTERIA NPH CULT: SIGNIFICANT CHANGE UP
BACTERIA UR CULT: SIGNIFICANT CHANGE UP
SPECIMEN SOURCE: SIGNIFICANT CHANGE UP
SPECIMEN SOURCE: SIGNIFICANT CHANGE UP

## 2020-02-24 RX ORDER — MUPIROCIN 20 MG/G
2 OINTMENT TOPICAL TWICE DAILY
Qty: 1 | Refills: 0 | Status: ACTIVE | COMMUNITY
Start: 2020-02-24 | End: 1900-01-01

## 2020-02-25 ENCOUNTER — FORM ENCOUNTER (OUTPATIENT)
Age: 50
End: 2020-02-25

## 2020-02-26 ENCOUNTER — APPOINTMENT (OUTPATIENT)
Dept: CT IMAGING | Facility: IMAGING CENTER | Age: 50
End: 2020-02-26
Payer: MEDICAID

## 2020-02-26 ENCOUNTER — OUTPATIENT (OUTPATIENT)
Dept: OUTPATIENT SERVICES | Facility: HOSPITAL | Age: 50
LOS: 1 days | End: 2020-02-26
Payer: MEDICAID

## 2020-02-26 DIAGNOSIS — Z90.49 ACQUIRED ABSENCE OF OTHER SPECIFIED PARTS OF DIGESTIVE TRACT: Chronic | ICD-10-CM

## 2020-02-26 DIAGNOSIS — M12.561 TRAUMATIC ARTHROPATHY, RIGHT KNEE: ICD-10-CM

## 2020-02-26 DIAGNOSIS — Z90.13 ACQUIRED ABSENCE OF BILATERAL BREASTS AND NIPPLES: Chronic | ICD-10-CM

## 2020-02-26 PROCEDURE — 73700 CT LOWER EXTREMITY W/O DYE: CPT | Mod: 26,RT

## 2020-02-26 PROCEDURE — 73700 CT LOWER EXTREMITY W/O DYE: CPT

## 2020-02-28 RX ORDER — TRAMADOL HYDROCHLORIDE 50 MG/1
50 TABLET, COATED ORAL
Qty: 40 | Refills: 2 | Status: ACTIVE | COMMUNITY
Start: 2020-02-28 | End: 1900-01-01

## 2020-03-01 ENCOUNTER — OUTPATIENT (OUTPATIENT)
Dept: OUTPATIENT SERVICES | Facility: HOSPITAL | Age: 50
LOS: 1 days | End: 2020-03-01

## 2020-03-01 DIAGNOSIS — Z90.13 ACQUIRED ABSENCE OF BILATERAL BREASTS AND NIPPLES: Chronic | ICD-10-CM

## 2020-03-01 DIAGNOSIS — Z90.49 ACQUIRED ABSENCE OF OTHER SPECIFIED PARTS OF DIGESTIVE TRACT: Chronic | ICD-10-CM

## 2020-03-04 ENCOUNTER — TRANSCRIPTION ENCOUNTER (OUTPATIENT)
Age: 50
End: 2020-03-04

## 2020-03-04 NOTE — ASU PATIENT PROFILE, ADULT - PSH
History of  Section  '  and 2007  History of mastectomy, bilateral  2013 with NIKITA  Normal vaginal delivery  ' /    S/P cholecystectomy  2019  S/P knee surgery  ' :  Right Knee Arthroscopy  S/P tubal ligation  '

## 2020-03-05 ENCOUNTER — INPATIENT (INPATIENT)
Facility: HOSPITAL | Age: 50
LOS: 0 days | Discharge: HOME HEALTH | End: 2020-03-06
Attending: ORTHOPAEDIC SURGERY | Admitting: ORTHOPAEDIC SURGERY
Payer: MEDICAID

## 2020-03-05 ENCOUNTER — RESULT REVIEW (OUTPATIENT)
Age: 50
End: 2020-03-05

## 2020-03-05 ENCOUNTER — APPOINTMENT (OUTPATIENT)
Dept: ORTHOPEDIC SURGERY | Facility: HOSPITAL | Age: 50
End: 2020-03-05

## 2020-03-05 VITALS
SYSTOLIC BLOOD PRESSURE: 141 MMHG | WEIGHT: 149.91 LBS | HEIGHT: 63 IN | RESPIRATION RATE: 16 BRPM | HEART RATE: 73 BPM | DIASTOLIC BLOOD PRESSURE: 74 MMHG | TEMPERATURE: 98 F | OXYGEN SATURATION: 97 %

## 2020-03-05 DIAGNOSIS — Z90.13 ACQUIRED ABSENCE OF BILATERAL BREASTS AND NIPPLES: Chronic | ICD-10-CM

## 2020-03-05 DIAGNOSIS — M17.11 UNILATERAL PRIMARY OSTEOARTHRITIS, RIGHT KNEE: ICD-10-CM

## 2020-03-05 DIAGNOSIS — Z90.49 ACQUIRED ABSENCE OF OTHER SPECIFIED PARTS OF DIGESTIVE TRACT: Chronic | ICD-10-CM

## 2020-03-05 LAB
ANION GAP SERPL CALC-SCNC: 12 MMO/L — SIGNIFICANT CHANGE UP (ref 7–14)
BUN SERPL-MCNC: 9 MG/DL — SIGNIFICANT CHANGE UP (ref 7–23)
CALCIUM SERPL-MCNC: 8.4 MG/DL — SIGNIFICANT CHANGE UP (ref 8.4–10.5)
CHLORIDE SERPL-SCNC: 104 MMOL/L — SIGNIFICANT CHANGE UP (ref 98–107)
CO2 SERPL-SCNC: 23 MMOL/L — SIGNIFICANT CHANGE UP (ref 22–31)
CREAT SERPL-MCNC: 0.79 MG/DL — SIGNIFICANT CHANGE UP (ref 0.5–1.3)
GLUCOSE BLDC GLUCOMTR-MCNC: 89 MG/DL — SIGNIFICANT CHANGE UP (ref 70–99)
GLUCOSE SERPL-MCNC: 100 MG/DL — HIGH (ref 70–99)
HCG UR QL: NEGATIVE — SIGNIFICANT CHANGE UP
HCT VFR BLD CALC: 33.8 % — LOW (ref 34.5–45)
HGB BLD-MCNC: 10.6 G/DL — LOW (ref 11.5–15.5)
MCHC RBC-ENTMCNC: 26.5 PG — LOW (ref 27–34)
MCHC RBC-ENTMCNC: 31.4 % — LOW (ref 32–36)
MCV RBC AUTO: 84.5 FL — SIGNIFICANT CHANGE UP (ref 80–100)
NRBC # FLD: 0 K/UL — SIGNIFICANT CHANGE UP (ref 0–0)
PLATELET # BLD AUTO: 171 K/UL — SIGNIFICANT CHANGE UP (ref 150–400)
PMV BLD: 10.1 FL — SIGNIFICANT CHANGE UP (ref 7–13)
POTASSIUM SERPL-MCNC: 4 MMOL/L — SIGNIFICANT CHANGE UP (ref 3.5–5.3)
POTASSIUM SERPL-SCNC: 4 MMOL/L — SIGNIFICANT CHANGE UP (ref 3.5–5.3)
RBC # BLD: 4 M/UL — SIGNIFICANT CHANGE UP (ref 3.8–5.2)
RBC # FLD: 14 % — SIGNIFICANT CHANGE UP (ref 10.3–14.5)
SODIUM SERPL-SCNC: 139 MMOL/L — SIGNIFICANT CHANGE UP (ref 135–145)
WBC # BLD: 7.47 K/UL — SIGNIFICANT CHANGE UP (ref 3.8–10.5)
WBC # FLD AUTO: 7.47 K/UL — SIGNIFICANT CHANGE UP (ref 3.8–10.5)

## 2020-03-05 PROCEDURE — 73560 X-RAY EXAM OF KNEE 1 OR 2: CPT | Mod: 26,RT

## 2020-03-05 PROCEDURE — 27447 TOTAL KNEE ARTHROPLASTY: CPT | Mod: RT

## 2020-03-05 PROCEDURE — 88305 TISSUE EXAM BY PATHOLOGIST: CPT | Mod: 26

## 2020-03-05 PROCEDURE — 88311 DECALCIFY TISSUE: CPT | Mod: 26

## 2020-03-05 RX ORDER — CEFAZOLIN SODIUM 1 G
2000 VIAL (EA) INJECTION EVERY 8 HOURS
Refills: 0 | Status: COMPLETED | OUTPATIENT
Start: 2020-03-05 | End: 2020-03-06

## 2020-03-05 RX ORDER — TRAMADOL HYDROCHLORIDE 50 MG/1
50 TABLET ORAL EVERY 8 HOURS
Refills: 0 | Status: DISCONTINUED | OUTPATIENT
Start: 2020-03-05 | End: 2020-03-06

## 2020-03-05 RX ORDER — ASPIRIN/CALCIUM CARB/MAGNESIUM 324 MG
325 TABLET ORAL
Refills: 0 | Status: DISCONTINUED | OUTPATIENT
Start: 2020-03-05 | End: 2020-03-06

## 2020-03-05 RX ORDER — KETOROLAC TROMETHAMINE 30 MG/ML
15 SYRINGE (ML) INJECTION EVERY 6 HOURS
Refills: 0 | Status: DISCONTINUED | OUTPATIENT
Start: 2020-03-05 | End: 2020-03-06

## 2020-03-05 RX ORDER — ACETAMINOPHEN 500 MG
650 TABLET ORAL EVERY 6 HOURS
Refills: 0 | Status: DISCONTINUED | OUTPATIENT
Start: 2020-03-05 | End: 2020-03-06

## 2020-03-05 RX ORDER — SODIUM CHLORIDE 9 MG/ML
1000 INJECTION, SOLUTION INTRAVENOUS ONCE
Refills: 0 | Status: COMPLETED | OUTPATIENT
Start: 2020-03-05 | End: 2020-03-05

## 2020-03-05 RX ORDER — FOLIC ACID 0.8 MG
1 TABLET ORAL DAILY
Refills: 0 | Status: DISCONTINUED | OUTPATIENT
Start: 2020-03-05 | End: 2020-03-06

## 2020-03-05 RX ORDER — HYDROMORPHONE HYDROCHLORIDE 2 MG/ML
1 INJECTION INTRAMUSCULAR; INTRAVENOUS; SUBCUTANEOUS
Refills: 0 | Status: DISCONTINUED | OUTPATIENT
Start: 2020-03-05 | End: 2020-03-05

## 2020-03-05 RX ORDER — MAGNESIUM HYDROXIDE 400 MG/1
30 TABLET, CHEWABLE ORAL DAILY
Refills: 0 | Status: DISCONTINUED | OUTPATIENT
Start: 2020-03-05 | End: 2020-03-06

## 2020-03-05 RX ORDER — ACETAMINOPHEN 500 MG
975 TABLET ORAL ONCE
Refills: 0 | Status: COMPLETED | OUTPATIENT
Start: 2020-03-05 | End: 2020-03-05

## 2020-03-05 RX ORDER — TRAMADOL HYDROCHLORIDE 50 MG/1
50 TABLET ORAL ONCE
Refills: 0 | Status: DISCONTINUED | OUTPATIENT
Start: 2020-03-05 | End: 2020-03-05

## 2020-03-05 RX ORDER — HYDROMORPHONE HYDROCHLORIDE 2 MG/ML
0.5 INJECTION INTRAMUSCULAR; INTRAVENOUS; SUBCUTANEOUS
Refills: 0 | Status: DISCONTINUED | OUTPATIENT
Start: 2020-03-05 | End: 2020-03-05

## 2020-03-05 RX ORDER — GABAPENTIN 400 MG/1
100 CAPSULE ORAL EVERY 8 HOURS
Refills: 0 | Status: DISCONTINUED | OUTPATIENT
Start: 2020-03-05 | End: 2020-03-06

## 2020-03-05 RX ORDER — FAMOTIDINE 10 MG/ML
20 INJECTION INTRAVENOUS EVERY 12 HOURS
Refills: 0 | Status: DISCONTINUED | OUTPATIENT
Start: 2020-03-05 | End: 2020-03-06

## 2020-03-05 RX ORDER — PANTOPRAZOLE SODIUM 20 MG/1
40 TABLET, DELAYED RELEASE ORAL
Refills: 0 | Status: DISCONTINUED | OUTPATIENT
Start: 2020-03-05 | End: 2020-03-06

## 2020-03-05 RX ORDER — SODIUM CHLORIDE 9 MG/ML
1000 INJECTION INTRAMUSCULAR; INTRAVENOUS; SUBCUTANEOUS
Refills: 0 | Status: DISCONTINUED | OUTPATIENT
Start: 2020-03-05 | End: 2020-03-06

## 2020-03-05 RX ORDER — GABAPENTIN 400 MG/1
300 CAPSULE ORAL ONCE
Refills: 0 | Status: COMPLETED | OUTPATIENT
Start: 2020-03-05 | End: 2020-03-05

## 2020-03-05 RX ORDER — HYDROMORPHONE HYDROCHLORIDE 2 MG/ML
0.5 INJECTION INTRAMUSCULAR; INTRAVENOUS; SUBCUTANEOUS EVERY 4 HOURS
Refills: 0 | Status: DISCONTINUED | OUTPATIENT
Start: 2020-03-05 | End: 2020-03-06

## 2020-03-05 RX ORDER — OXYCODONE HYDROCHLORIDE 5 MG/1
5 TABLET ORAL EVERY 4 HOURS
Refills: 0 | Status: DISCONTINUED | OUTPATIENT
Start: 2020-03-05 | End: 2020-03-06

## 2020-03-05 RX ORDER — POLYETHYLENE GLYCOL 3350 17 G/17G
17 POWDER, FOR SOLUTION ORAL DAILY
Refills: 0 | Status: DISCONTINUED | OUTPATIENT
Start: 2020-03-05 | End: 2020-03-06

## 2020-03-05 RX ORDER — OXYCODONE HYDROCHLORIDE 5 MG/1
10 TABLET ORAL EVERY 4 HOURS
Refills: 0 | Status: DISCONTINUED | OUTPATIENT
Start: 2020-03-05 | End: 2020-03-06

## 2020-03-05 RX ORDER — SODIUM CHLORIDE 9 MG/ML
1000 INJECTION, SOLUTION INTRAVENOUS ONCE
Refills: 0 | Status: COMPLETED | OUTPATIENT
Start: 2020-03-06 | End: 2020-03-06

## 2020-03-05 RX ORDER — PANTOPRAZOLE SODIUM 20 MG/1
40 TABLET, DELAYED RELEASE ORAL ONCE
Refills: 0 | Status: COMPLETED | OUTPATIENT
Start: 2020-03-05 | End: 2020-03-05

## 2020-03-05 RX ORDER — ONDANSETRON 8 MG/1
4 TABLET, FILM COATED ORAL EVERY 6 HOURS
Refills: 0 | Status: DISCONTINUED | OUTPATIENT
Start: 2020-03-05 | End: 2020-03-06

## 2020-03-05 RX ORDER — CELECOXIB 200 MG/1
200 CAPSULE ORAL DAILY
Refills: 0 | Status: DISCONTINUED | OUTPATIENT
Start: 2020-03-07 | End: 2020-03-06

## 2020-03-05 RX ORDER — SENNA PLUS 8.6 MG/1
2 TABLET ORAL AT BEDTIME
Refills: 0 | Status: DISCONTINUED | OUTPATIENT
Start: 2020-03-05 | End: 2020-03-06

## 2020-03-05 RX ORDER — DEXAMETHASONE 0.5 MG/5ML
10 ELIXIR ORAL ONCE
Refills: 0 | Status: COMPLETED | OUTPATIENT
Start: 2020-03-05 | End: 2020-03-05

## 2020-03-05 RX ORDER — DEXAMETHASONE 0.5 MG/5ML
10 ELIXIR ORAL ONCE
Refills: 0 | Status: COMPLETED | OUTPATIENT
Start: 2020-03-06 | End: 2020-03-06

## 2020-03-05 RX ADMIN — Medication 975 MILLIGRAM(S): at 12:11

## 2020-03-05 RX ADMIN — SODIUM CHLORIDE 3 MILLILITER(S): 9 INJECTION INTRAMUSCULAR; INTRAVENOUS; SUBCUTANEOUS at 22:26

## 2020-03-05 RX ADMIN — GABAPENTIN 100 MILLIGRAM(S): 400 CAPSULE ORAL at 22:25

## 2020-03-05 RX ADMIN — TRAMADOL HYDROCHLORIDE 50 MILLIGRAM(S): 50 TABLET ORAL at 22:25

## 2020-03-05 RX ADMIN — TRAMADOL HYDROCHLORIDE 50 MILLIGRAM(S): 50 TABLET ORAL at 12:11

## 2020-03-05 RX ADMIN — SODIUM CHLORIDE 1000 MILLILITER(S): 9 INJECTION, SOLUTION INTRAVENOUS at 19:14

## 2020-03-05 RX ADMIN — OXYCODONE HYDROCHLORIDE 10 MILLIGRAM(S): 5 TABLET ORAL at 20:50

## 2020-03-05 RX ADMIN — Medication 100 MILLIGRAM(S): at 22:25

## 2020-03-05 RX ADMIN — POLYETHYLENE GLYCOL 3350 17 GRAM(S): 17 POWDER, FOR SOLUTION ORAL at 19:08

## 2020-03-05 RX ADMIN — PANTOPRAZOLE SODIUM 40 MILLIGRAM(S): 20 TABLET, DELAYED RELEASE ORAL at 12:11

## 2020-03-05 RX ADMIN — Medication 650 MILLIGRAM(S): at 19:39

## 2020-03-05 RX ADMIN — HYDROMORPHONE HYDROCHLORIDE 0.5 MILLIGRAM(S): 2 INJECTION INTRAMUSCULAR; INTRAVENOUS; SUBCUTANEOUS at 17:05

## 2020-03-05 RX ADMIN — Medication 1 TABLET(S): at 22:25

## 2020-03-05 RX ADMIN — HYDROMORPHONE HYDROCHLORIDE 0.5 MILLIGRAM(S): 2 INJECTION INTRAMUSCULAR; INTRAVENOUS; SUBCUTANEOUS at 17:20

## 2020-03-05 RX ADMIN — GABAPENTIN 300 MILLIGRAM(S): 400 CAPSULE ORAL at 12:10

## 2020-03-05 RX ADMIN — FAMOTIDINE 20 MILLIGRAM(S): 10 INJECTION INTRAVENOUS at 19:09

## 2020-03-05 RX ADMIN — Medication 325 MILLIGRAM(S): at 19:09

## 2020-03-05 RX ADMIN — OXYCODONE HYDROCHLORIDE 10 MILLIGRAM(S): 5 TABLET ORAL at 20:10

## 2020-03-05 RX ADMIN — Medication 15 MILLIGRAM(S): at 19:09

## 2020-03-05 RX ADMIN — Medication 650 MILLIGRAM(S): at 19:08

## 2020-03-05 RX ADMIN — Medication 15 MILLIGRAM(S): at 19:40

## 2020-03-05 NOTE — PROGRESS NOTE ADULT - SUBJECTIVE AND OBJECTIVE BOX
ORTHO POC      Patient resting comfortably without complaint s/p SANDRO-> right TKA today     Vital Signs Last 24 Hrs  T(C): 36.6 (05 Mar 2020 15:55), Max: 36.6 (05 Mar 2020 15:55)  T(F): 97.9 (05 Mar 2020 15:55), Max: 97.9 (05 Mar 2020 15:55)  HR: 88 (05 Mar 2020 16:45) (73 - 101)  BP: 137/82 (05 Mar 2020 16:45) (115/70 - 145/77)  BP(mean): 97 (05 Mar 2020 16:45) (81 - 97)  RR: 11 (05 Mar 2020 16:45) (9 - 16)  SpO2: 96% (05 Mar 2020 16:45) (96% - 100%)    right knee : dressing clean/dry/ intact             LE:  EHL/GC/TA 5/5                  sensory intact                   DP pulse 2+    Labs : Pending                      U/O:  DTV    A/P: Stable postop            PT- WBAT            DVT prophylaxis- venodynes/ ASA BID           Pain Control            Incentive Spirometer            Antibiotics x 24 hr            Follow up AM labs

## 2020-03-05 NOTE — BRIEF OPERATIVE NOTE - NSICDXBRIEFPROCEDURE_GEN_ALL_CORE_FT
PROCEDURES:  Simple removal of hardware from extremity 05-Mar-2020 15:42:26  Fabiana Osman  Right total knee replacement 05-Mar-2020 15:41:35  Fabiana Osman

## 2020-03-06 ENCOUNTER — TRANSCRIPTION ENCOUNTER (OUTPATIENT)
Age: 50
End: 2020-03-06

## 2020-03-06 VITALS
SYSTOLIC BLOOD PRESSURE: 131 MMHG | HEART RATE: 100 BPM | RESPIRATION RATE: 18 BRPM | OXYGEN SATURATION: 98 % | DIASTOLIC BLOOD PRESSURE: 78 MMHG | TEMPERATURE: 98 F

## 2020-03-06 DIAGNOSIS — M17.11 UNILATERAL PRIMARY OSTEOARTHRITIS, RIGHT KNEE: ICD-10-CM

## 2020-03-06 DIAGNOSIS — C50.919 MALIGNANT NEOPLASM OF UNSPECIFIED SITE OF UNSPECIFIED FEMALE BREAST: ICD-10-CM

## 2020-03-06 DIAGNOSIS — D57.3 SICKLE-CELL TRAIT: ICD-10-CM

## 2020-03-06 DIAGNOSIS — Z29.9 ENCOUNTER FOR PROPHYLACTIC MEASURES, UNSPECIFIED: ICD-10-CM

## 2020-03-06 LAB
ANION GAP SERPL CALC-SCNC: 12 MMO/L — SIGNIFICANT CHANGE UP (ref 7–14)
BUN SERPL-MCNC: 10 MG/DL — SIGNIFICANT CHANGE UP (ref 7–23)
CALCIUM SERPL-MCNC: 8 MG/DL — LOW (ref 8.4–10.5)
CHLORIDE SERPL-SCNC: 107 MMOL/L — SIGNIFICANT CHANGE UP (ref 98–107)
CO2 SERPL-SCNC: 21 MMOL/L — LOW (ref 22–31)
CREAT SERPL-MCNC: 0.75 MG/DL — SIGNIFICANT CHANGE UP (ref 0.5–1.3)
GLUCOSE SERPL-MCNC: 232 MG/DL — HIGH (ref 70–99)
HCT VFR BLD CALC: 27.9 % — LOW (ref 34.5–45)
HGB BLD-MCNC: 8.6 G/DL — LOW (ref 11.5–15.5)
MCHC RBC-ENTMCNC: 26.1 PG — LOW (ref 27–34)
MCHC RBC-ENTMCNC: 30.8 % — LOW (ref 32–36)
MCV RBC AUTO: 84.5 FL — SIGNIFICANT CHANGE UP (ref 80–100)
NRBC # FLD: 0 K/UL — SIGNIFICANT CHANGE UP (ref 0–0)
PLATELET # BLD AUTO: 174 K/UL — SIGNIFICANT CHANGE UP (ref 150–400)
PMV BLD: 10.7 FL — SIGNIFICANT CHANGE UP (ref 7–13)
POTASSIUM SERPL-MCNC: 3.9 MMOL/L — SIGNIFICANT CHANGE UP (ref 3.5–5.3)
POTASSIUM SERPL-SCNC: 3.9 MMOL/L — SIGNIFICANT CHANGE UP (ref 3.5–5.3)
RBC # BLD: 3.3 M/UL — LOW (ref 3.8–5.2)
RBC # FLD: 13.8 % — SIGNIFICANT CHANGE UP (ref 10.3–14.5)
SODIUM SERPL-SCNC: 140 MMOL/L — SIGNIFICANT CHANGE UP (ref 135–145)
WBC # BLD: 9.25 K/UL — SIGNIFICANT CHANGE UP (ref 3.8–10.5)
WBC # FLD AUTO: 9.25 K/UL — SIGNIFICANT CHANGE UP (ref 3.8–10.5)

## 2020-03-06 PROCEDURE — 99223 1ST HOSP IP/OBS HIGH 75: CPT

## 2020-03-06 RX ORDER — SENNA PLUS 8.6 MG/1
2 TABLET ORAL AT BEDTIME
Refills: 0 | Status: DISCONTINUED | OUTPATIENT
Start: 2020-03-06 | End: 2020-03-06

## 2020-03-06 RX ORDER — ACETAMINOPHEN 500 MG
2 TABLET ORAL
Qty: 120 | Refills: 0
Start: 2020-03-06 | End: 2020-03-20

## 2020-03-06 RX ORDER — ASPIRIN/CALCIUM CARB/MAGNESIUM 324 MG
1 TABLET ORAL
Qty: 84 | Refills: 0
Start: 2020-03-06 | End: 2020-04-16

## 2020-03-06 RX ORDER — OXYCODONE HYDROCHLORIDE 5 MG/1
1 TABLET ORAL
Qty: 42 | Refills: 0
Start: 2020-03-06 | End: 2020-03-12

## 2020-03-06 RX ORDER — ASPIRIN/CALCIUM CARB/MAGNESIUM 324 MG
325 TABLET ORAL
Refills: 0 | Status: DISCONTINUED | OUTPATIENT
Start: 2020-03-06 | End: 2020-03-06

## 2020-03-06 RX ORDER — HYDROMORPHONE HYDROCHLORIDE 2 MG/ML
0.5 INJECTION INTRAMUSCULAR; INTRAVENOUS; SUBCUTANEOUS EVERY 4 HOURS
Refills: 0 | Status: DISCONTINUED | OUTPATIENT
Start: 2020-03-06 | End: 2020-03-06

## 2020-03-06 RX ORDER — KETOROLAC TROMETHAMINE 30 MG/ML
15 SYRINGE (ML) INJECTION EVERY 6 HOURS
Refills: 0 | Status: COMPLETED | OUTPATIENT
Start: 2020-03-06 | End: 2020-03-07

## 2020-03-06 RX ORDER — SENNA PLUS 8.6 MG/1
2 TABLET ORAL
Qty: 30 | Refills: 0
Start: 2020-03-06 | End: 2020-03-20

## 2020-03-06 RX ORDER — IBUPROFEN 200 MG
1 TABLET ORAL
Qty: 0 | Refills: 0 | DISCHARGE

## 2020-03-06 RX ORDER — ACETAMINOPHEN 500 MG
650 TABLET ORAL EVERY 6 HOURS
Refills: 0 | Status: DISCONTINUED | OUTPATIENT
Start: 2020-03-06 | End: 2020-03-06

## 2020-03-06 RX ORDER — PANTOPRAZOLE SODIUM 20 MG/1
1 TABLET, DELAYED RELEASE ORAL
Qty: 30 | Refills: 0
Start: 2020-03-06 | End: 2020-04-04

## 2020-03-06 RX ORDER — TRAMADOL HYDROCHLORIDE 50 MG/1
50 TABLET ORAL EVERY 8 HOURS
Refills: 0 | Status: DISCONTINUED | OUTPATIENT
Start: 2020-03-06 | End: 2020-03-06

## 2020-03-06 RX ORDER — TRAMADOL HYDROCHLORIDE 50 MG/1
1 TABLET ORAL
Qty: 21 | Refills: 0
Start: 2020-03-06 | End: 2020-03-12

## 2020-03-06 RX ORDER — OXYCODONE HYDROCHLORIDE 5 MG/1
10 TABLET ORAL EVERY 4 HOURS
Refills: 0 | Status: DISCONTINUED | OUTPATIENT
Start: 2020-03-06 | End: 2020-03-06

## 2020-03-06 RX ORDER — GABAPENTIN 400 MG/1
1 CAPSULE ORAL
Qty: 90 | Refills: 0
Start: 2020-03-06 | End: 2020-04-04

## 2020-03-06 RX ADMIN — PANTOPRAZOLE SODIUM 40 MILLIGRAM(S): 20 TABLET, DELAYED RELEASE ORAL at 05:04

## 2020-03-06 RX ADMIN — OXYCODONE HYDROCHLORIDE 10 MILLIGRAM(S): 5 TABLET ORAL at 11:43

## 2020-03-06 RX ADMIN — SODIUM CHLORIDE 3 MILLILITER(S): 9 INJECTION INTRAMUSCULAR; INTRAVENOUS; SUBCUTANEOUS at 06:46

## 2020-03-06 RX ADMIN — Medication 15 MILLIGRAM(S): at 07:16

## 2020-03-06 RX ADMIN — Medication 650 MILLIGRAM(S): at 05:04

## 2020-03-06 RX ADMIN — TRAMADOL HYDROCHLORIDE 50 MILLIGRAM(S): 50 TABLET ORAL at 15:08

## 2020-03-06 RX ADMIN — OXYCODONE HYDROCHLORIDE 10 MILLIGRAM(S): 5 TABLET ORAL at 07:16

## 2020-03-06 RX ADMIN — POLYETHYLENE GLYCOL 3350 17 GRAM(S): 17 POWDER, FOR SOLUTION ORAL at 11:36

## 2020-03-06 RX ADMIN — Medication 102 MILLIGRAM(S): at 07:16

## 2020-03-06 RX ADMIN — SODIUM CHLORIDE 1000 MILLILITER(S): 9 INJECTION, SOLUTION INTRAVENOUS at 05:05

## 2020-03-06 RX ADMIN — Medication 102 MILLIGRAM(S): at 00:03

## 2020-03-06 RX ADMIN — SODIUM CHLORIDE 3 MILLILITER(S): 9 INJECTION INTRAMUSCULAR; INTRAVENOUS; SUBCUTANEOUS at 14:39

## 2020-03-06 RX ADMIN — Medication 15 MILLIGRAM(S): at 11:36

## 2020-03-06 RX ADMIN — OXYCODONE HYDROCHLORIDE 10 MILLIGRAM(S): 5 TABLET ORAL at 07:46

## 2020-03-06 RX ADMIN — Medication 650 MILLIGRAM(S): at 00:03

## 2020-03-06 RX ADMIN — GABAPENTIN 100 MILLIGRAM(S): 400 CAPSULE ORAL at 05:04

## 2020-03-06 RX ADMIN — Medication 325 MILLIGRAM(S): at 05:04

## 2020-03-06 RX ADMIN — Medication 100 MILLIGRAM(S): at 05:03

## 2020-03-06 RX ADMIN — Medication 15 MILLIGRAM(S): at 00:03

## 2020-03-06 RX ADMIN — SODIUM CHLORIDE 3 MILLILITER(S): 9 INJECTION INTRAMUSCULAR; INTRAVENOUS; SUBCUTANEOUS at 05:06

## 2020-03-06 RX ADMIN — TRAMADOL HYDROCHLORIDE 50 MILLIGRAM(S): 50 TABLET ORAL at 05:04

## 2020-03-06 RX ADMIN — GABAPENTIN 100 MILLIGRAM(S): 400 CAPSULE ORAL at 15:09

## 2020-03-06 RX ADMIN — Medication 1 TABLET(S): at 05:04

## 2020-03-06 RX ADMIN — OXYCODONE HYDROCHLORIDE 10 MILLIGRAM(S): 5 TABLET ORAL at 12:30

## 2020-03-06 RX ADMIN — FAMOTIDINE 20 MILLIGRAM(S): 10 INJECTION INTRAVENOUS at 05:04

## 2020-03-06 RX ADMIN — Medication 650 MILLIGRAM(S): at 11:36

## 2020-03-06 NOTE — PROGRESS NOTE ADULT - SUBJECTIVE AND OBJECTIVE BOX
ANESTHESIA POSTOP CHECK    49y Female POSTOP DAY 1     Vital Signs Last 24 Hrs  T(C): 36.7 (06 Mar 2020 09:35), Max: 36.7 (05 Mar 2020 18:46)  T(F): 98.1 (06 Mar 2020 09:35), Max: 98.1 (05 Mar 2020 22:25)  HR: 75 (06 Mar 2020 09:35) (75 - 101)  BP: 128/59 (06 Mar 2020 09:35) (104/75 - 145/77)  BP(mean): 85 (05 Mar 2020 17:45) (79 - 97)  RR: 17 (06 Mar 2020 09:35) (9 - 17)  SpO2: 98% (06 Mar 2020 09:35) (94% - 100%)  I&O's Summary    05 Mar 2020 07:01  -  06 Mar 2020 07:00  --------------------------------------------------------  IN: 375 mL / OUT: 400 mL / NET: -25 mL    06 Mar 2020 07:01  -  06 Mar 2020 12:04  --------------------------------------------------------  IN: 0 mL / OUT: 500 mL / NET: -500 mL        [X ] NO APPARENT ANESTHESIA COMPLICATIONS      Comments:

## 2020-03-06 NOTE — DISCHARGE NOTE PROVIDER - NSDCCPTREATMENT_GEN_ALL_CORE_FT
LM to call us back   PRINCIPAL PROCEDURE  Procedure: Right total knee replacement  Findings and Treatment:

## 2020-03-06 NOTE — DISCHARGE NOTE NURSING/CASE MANAGEMENT/SOCIAL WORK - NSDCPNINST_GEN_ALL_CORE
You have a post op appointment with Dr Pendleton on March 18th, 2020 @ 9:45 am, please keep postop dressing in place until this appointment.  Notify Dr Pendleton if you experience any increase in pain  not releievd with paIN MEDICATION, ANY REDNESS, DRAINAGE OR swelling around incision or if you develop a fever >100.5.  Drink plenty of fluids.  No heavy lifting or straining.  Continue to elevate leg and do exercises as instructed.  Use over the counter stool softeners to assist with constipation which can be a side effect of your pain medication.

## 2020-03-06 NOTE — DISCHARGE NOTE NURSING/CASE MANAGEMENT/SOCIAL WORK - NSDPDISTO_GEN_ALL_CORE
stable, right knee dressing in place clean dry and intact, tolerating diet, voiding well, oob with assist/Home with home care

## 2020-03-06 NOTE — CONSULT NOTE ADULT - ASSESSMENT
49 year old female with sickle trait, gallbladder removal, breast Ca s/p b/l total mastectomy presented for removal of right knee hardware/ right knee arthroplasty now s/p OR on 3/5/20.

## 2020-03-06 NOTE — PHYSICAL THERAPY INITIAL EVALUATION ADULT - ACTIVE RANGE OF MOTION EXAMINATION, REHAB EVAL
except right knee flexion/extension -5 degrees to 100 degrees/bilateral  lower extremity Active ROM was WFL (within functional limits)/bilateral upper extremity Active ROM was WFL (within functional limits)

## 2020-03-06 NOTE — CONSULT NOTE ADULT - PROBLEM SELECTOR PROBLEM 2
Breast cancer
Quality 226: Preventive Care And Screening: Tobacco Use: Screening And Cessation Intervention: Patient screened for tobacco use and is an ex/non-smoker
Detail Level: Detailed
Quality 431: Preventive Care And Screening: Unhealthy Alcohol Use - Screening: Patient screened for unhealthy alcohol use using a single question and scores less than 2 times per year

## 2020-03-06 NOTE — OCCUPATIONAL THERAPY INITIAL EVALUATION ADULT - PERTINENT HX OF CURRENT PROBLEM, REHAB EVAL
Pt is a 49 year old female with "hx of right tibia fx 2000 screw placed, right knee pain for many years, worse over the past 2 months, xray shows bone on bone." Pt is now s/p removal of hardware and Right Total Knee Replacement on 3/5/2020.

## 2020-03-06 NOTE — OCCUPATIONAL THERAPY INITIAL EVALUATION ADULT - GENERAL OBSERVATIONS, REHAB EVAL
Pt. received semisupine in bed. No acute distress. Patient agreed to evaluation from Occupational Therapist. +Clean dry intact dressing to Right LE, +IV.

## 2020-03-06 NOTE — PHYSICAL THERAPY INITIAL EVALUATION ADULT - GENERAL OBSERVATIONS, REHAB EVAL
Patient found supine in bed in NAD; +IV; +right knee dressing CDI; +SLR. Patient found supine in bed in NAD; +IV; +right knee dressing CDI; +SLR; patient able to demonstrate car transfers independently

## 2020-03-06 NOTE — CONSULT NOTE ADULT - SUBJECTIVE AND OBJECTIVE BOX
[facility] Division of Hospital Medicine  Manuel Zurita MD  Pager #[phone]    CHIEF COMPLAINT: Patient is a 49y old  Female who presents with a chief complaint of SANDRO-> right TKA 3/5/20 (06 Mar 2020 06:57)    HPI: 49 year old female with sickle trait, gallbladder removal, breast Ca s/p b/l total mastectomy presented for removal of right knee hardware/ right knee arthroplasty now s/p OR on 3/5/20. had been experiencing chronic progressive right knee discomfort for many years which was worse the past few months prompting her to seek care. Currently feels well, no fever, chills, nausea, vomiting, shortness of breath, chest pain. No headaches, lightheadedness, dizziness.     Allergies  No Known Drug Allergies    HOME MEDICATIONS: [x] Reviewed    MEDICATIONS  (STANDING):  acetaminophen   Tablet .. 650 milliGRAM(s) Oral every 6 hours  aspirin enteric coated 325 milliGRAM(s) Oral two times a day  gabapentin 100 milliGRAM(s) Oral every 8 hours  ketorolac   Injectable 15 milliGRAM(s) IV Push every 6 hours  pantoprazole    Tablet 40 milliGRAM(s) Oral before breakfast  polyethylene glycol 3350 17 Gram(s) Oral daily  senna 2 Tablet(s) Oral at bedtime  sodium chloride 0.9% lock flush 3 milliLiter(s) IV Push every 8 hours  sodium chloride 0.9%. 1000 milliLiter(s) (125 mL/Hr) IV Continuous <Continuous>  traMADol 50 milliGRAM(s) Oral every 8 hours    MEDICATIONS  (PRN):  aluminum hydroxide/magnesium hydroxide/simethicone Suspension 30 milliLiter(s) Oral four times a day PRN Indigestion  HYDROmorphone  Injectable 0.5 milliGRAM(s) IV Push every 4 hours PRN breakthrough pain  magnesium hydroxide Suspension 30 milliLiter(s) Oral daily PRN Constipation  ondansetron Injectable 4 milliGRAM(s) IV Push every 6 hours PRN Nausea and/or Vomiting  oxyCODONE    IR 5 milliGRAM(s) Oral every 4 hours PRN Mild Pain (1 - 3)  oxyCODONE    IR 10 milliGRAM(s) Oral every 4 hours PRN Moderate or Severe pain    PAST MEDICAL & SURGICAL HISTORY:  Osteoarthritis  Sciatica  Calculus of gallbladder without cholangitis or cholecystitis  Anxiety  Sickle cell trait  History of hepatitis B: &#x27; 90&#x27;s  Pseudotumor (inflammatory) of orbit: Right:  &#x27; .  Treated with prednisone. No surgery  Multiparity  Medial meniscus tear: &#x27;:  Right Knee  Breast cancer: dx: 2013, s/p bilateral mastectomy   S/P cholecystectomy: 2019  History of mastectomy, bilateral: 2013 with NIKITA  S/P tubal ligation: &#x27;   S/P knee surgery: &#x27; :  Right Knee Arthroscopy  Normal vaginal delivery: &#x27; 91/  &#x27; 92/  &#x27; 99  History of  Section: &#x27;  and &#x27; 2007  [X] Reviewed     SOCIAL HISTORY:  [x] Substance abuse: denies	  [x] Tobacco: denies tobacco use	  [x] Alcohol use: denies alcohol use    FAMILY HISTORY:  Family history of sickle cell trait in father (Father)  Family history of sickle cell anemia: son  [x] No pertinent family history in first degree relatives     REVIEW OF SYSTEMS:    Constitutional: no recent weight changes, fevers, night sweats or fatigue  Dermatologic: no lesions or rashes.  HEENT: denies visual changes, hearing changes, rhinitis, odynophagia, or dysphagia  Cardiovascular: denies palpitations, chest pain, occasional pedal edema with prolonged standing  Respiratory: denies SOB, wheezing  Gastrointestinal: denies N/V/D, abdominal pain, hematochezia, melena  : denies dysuria, hematuria  MSK: +knee pain  Endocrine: no cold or heat intolerance or excessive thirst or urination  Hematologic: no excessive bleeding or clotting  Neurologic: no headaches, vision changes, dizziness, fainting or numbness, tingling or weakness    Vital Signs Last 24 Hrs  T(C): 36.7 (06 Mar 2020 09:35), Max: 36.7 (05 Mar 2020 18:46)  T(F): 98.1 (06 Mar 2020 09:35), Max: 98.1 (05 Mar 2020 22:25)  HR: 75 (06 Mar 2020 09:35) (75 - 101)  BP: 128/59 (06 Mar 2020 09:35) (104/75 - 145/77)  BP(mean): 85 (05 Mar 2020 17:45) (79 - 97)  RR: 17 (06 Mar 2020 09:35) (9 - 17)  SpO2: 98% (06 Mar 2020 09:35) (94% - 100%)    PHYSICAL EXAM:    GENERAL: NAD, well-groomed, well-developed  HEAD:  Atraumatic, Normocephalic  EYES: conjunctiva and sclera clear  ENMT: Moist mucous membranes  NECK: Supple, No JVD  RESPIRATORY: Clear to auscultation bilaterally; No rales, rhonchi, wheezing, or rubs  CARDIOVASCULAR: Regular rate and rhythm; No murmurs, rubs, or gallops  GASTROINTESTINAL: Soft, Nontender, Nondistended; Bowel sounds present  GENITOURINARY: Not examined  EXTREMITIES:  2+ Peripheral Pulses, No clubbing, cyanosis, or edema  NERVOUS SYSTEM:  Alert & Oriented X3; Moving all 4 extremities; No gross sensory deficits  HEME/LYMPH: No lymphadenopathy noted  SKIN: No rashes appreciated    LABS:                        8.6    9.25  )-----------( 174      ( 06 Mar 2020 07:06 )             27.9     03-06    140  |  107  |  10  ----------------------------<  232<H>  3.9   |  21<L>  |  0.75    Ca    8.0<L>      06 Mar 2020 07:06      CAPILLARY BLOOD GLUCOSE    POCT Blood Glucose.: 89 mg/dL (05 Mar 2020 11:23)    RADIOLOGY & ADDITIONAL STUDIES: Reviewed  Imaging:   Personally Reviewed:  [x] YES               [x] Care Discussed with Consultants/Other Providers: Orthopedics PA - discussed [facility] of Hospital Medicine  Manuel Zurita MD  Pager #[phone]    CHIEF COMPLAINT: Patient is a 49y old  Female who presents with a chief complaint of SANDRO-> right TKA 3/5/20 (06 Mar 2020 06:57)    HPI: 49 year old female with sickle trait, gallbladder removal, breast Ca s/p b/l total mastectomy presented for removal of right knee hardware/ right knee arthroplasty now s/p OR on 3/5/20. had been experiencing chronic progressive right knee discomfort for many years which was worse the past few months prompting her to seek care. Currently feels well, no fever, chills, nausea, vomiting, shortness of breath, chest pain. No headaches, lightheadedness, dizziness.     Allergies  No Known Drug Allergies    HOME MEDICATIONS: [x] Reviewed    MEDICATIONS  (STANDING):  acetaminophen   Tablet .. 650 milliGRAM(s) Oral every 6 hours  aspirin enteric coated 325 milliGRAM(s) Oral two times a day  gabapentin 100 milliGRAM(s) Oral every 8 hours  ketorolac   Injectable 15 milliGRAM(s) IV Push every 6 hours  pantoprazole    Tablet 40 milliGRAM(s) Oral before breakfast  polyethylene glycol 3350 17 Gram(s) Oral daily  senna 2 Tablet(s) Oral at bedtime  sodium chloride 0.9% lock flush 3 milliLiter(s) IV Push every 8 hours  sodium chloride 0.9%. 1000 milliLiter(s) (125 mL/Hr) IV Continuous <Continuous>  traMADol 50 milliGRAM(s) Oral every 8 hours    MEDICATIONS  (PRN):  aluminum hydroxide/magnesium hydroxide/simethicone Suspension 30 milliLiter(s) Oral four times a day PRN Indigestion  HYDROmorphone  Injectable 0.5 milliGRAM(s) IV Push every 4 hours PRN breakthrough pain  magnesium hydroxide Suspension 30 milliLiter(s) Oral daily PRN Constipation  ondansetron Injectable 4 milliGRAM(s) IV Push every 6 hours PRN Nausea and/or Vomiting  oxyCODONE    IR 5 milliGRAM(s) Oral every 4 hours PRN Mild Pain (1 - 3)  oxyCODONE    IR 10 milliGRAM(s) Oral every 4 hours PRN Moderate or Severe pain    PAST MEDICAL & SURGICAL HISTORY:  Osteoarthritis  Sciatica  Calculus of gallbladder without cholangitis or cholecystitis  Anxiety  Sickle cell trait  History of hepatitis B: &#x27; 90&#x27;s  Pseudotumor (inflammatory) of orbit: Right:  &#x27; .  Treated with prednisone. No surgery  Multiparity  Medial meniscus tear: &#x27;:  Right Knee  Breast cancer: dx: 2013, s/p bilateral mastectomy   S/P cholecystectomy: 2019  History of mastectomy, bilateral: 2013 with NIKITA  S/P tubal ligation: &#x27;   S/P knee surgery: &#x27; :  Right Knee Arthroscopy  Normal vaginal delivery: &#x27; 91/  &#x27; 92/  &#x27; 99  History of  Section: &#x27;  and &#x27; 2007  [X] Reviewed     SOCIAL HISTORY:  [x] Substance abuse: denies use  [x] Tobacco: denies tobacco use	  [x] Alcohol use: endorses social alcohol use    FAMILY HISTORY:  Family history of sickle cell trait in father (Father)  Family history of sickle cell anemia: son  [x] No pertinent family history in first degree relatives     REVIEW OF SYSTEMS:    Constitutional: no recent weight changes, fevers, night sweats or fatigue  Dermatologic: no lesions or rashes.  HEENT: denies visual changes, hearing changes, rhinitis, odynophagia, or dysphagia  Cardiovascular: denies palpitations, chest pain, occasional pedal edema with prolonged standing  Respiratory: denies SOB, wheezing  Gastrointestinal: denies N/V/D, abdominal pain, hematochezia, melena  : denies dysuria, hematuria  MSK: +knee pain  Endocrine: no cold or heat intolerance or excessive thirst or urination  Hematologic: no excessive bleeding or clotting  Neurologic: no headaches, vision changes, dizziness, fainting or numbness, tingling or weakness    Vital Signs Last 24 Hrs  T(C): 36.7 (06 Mar 2020 09:35), Max: 36.7 (05 Mar 2020 18:46)  T(F): 98.1 (06 Mar 2020 09:35), Max: 98.1 (05 Mar 2020 22:25)  HR: 75 (06 Mar 2020 09:35) (75 - 101)  BP: 128/59 (06 Mar 2020 09:35) (104/75 - 145/77)  BP(mean): 85 (05 Mar 2020 17:45) (79 - 97)  RR: 17 (06 Mar 2020 09:35) (9 - 17)  SpO2: 98% (06 Mar 2020 09:35) (94% - 100%)    PHYSICAL EXAM:    GENERAL: NAD, well-groomed, well-developed  HEAD:  Atraumatic, Normocephalic  EYES: conjunctiva and sclera clear  ENMT: Moist mucous membranes  NECK: Supple, No JVD  RESPIRATORY: Clear to auscultation bilaterally; No rales, rhonchi, wheezing, or rubs  CARDIOVASCULAR: Regular rate and rhythm; No murmurs, rubs, or gallops  GASTROINTESTINAL: Soft, Nontender, Nondistended; Bowel sounds present  GENITOURINARY: Not examined  EXTREMITIES:  2+ Peripheral Pulses, No clubbing, cyanosis, or edema  NERVOUS SYSTEM:  Alert & Oriented X3; Moving all 4 extremities; No gross sensory deficits  HEME/LYMPH: No lymphadenopathy noted  SKIN: No rashes appreciated    LABS:                        8.6    9.25  )-----------( 174      ( 06 Mar 2020 07:06 )             27.9     03-06    140  |  107  |  10  ----------------------------<  232<H>  3.9   |  21<L>  |  0.75    Ca    8.0<L>      06 Mar 2020 07:06      CAPILLARY BLOOD GLUCOSE    POCT Blood Glucose.: 89 mg/dL (05 Mar 2020 11:23)    RADIOLOGY & ADDITIONAL STUDIES: Reviewed  Imaging:   Personally Reviewed:  [x] YES               [x] Care Discussed with Consultants/Other Providers: Orthopedics PA - discussed

## 2020-03-06 NOTE — OCCUPATIONAL THERAPY INITIAL EVALUATION ADULT - LIVES WITH, PROFILE
Pt. reports she lives with her  in a house with 4 steps to enter. Once inside, pt. reports bedroom and bathroom are located on the main level. Per pt., she has a shower stall in her bathroom.

## 2020-03-06 NOTE — DISCHARGE NOTE PROVIDER - NSDCMRMEDTOKEN_GEN_ALL_CORE_FT
Advil 200 mg oral tablet: 1 tab(s) orally every 6 hours acetaminophen 325 mg oral tablet: 2 tab(s) orally every 6 hours MDD:8  aspirin 325 mg oral delayed release tablet: 1 tab(s) orally 2 times a day MDD:2  gabapentin 100 mg oral capsule: 1 cap(s) orally 3 times a day MDD:3  oxyCODONE 5 mg oral tablet: 1 -2 tab(s) orally every 4 hours, As needed, Mild Pain (1 - 3) MDD:8  pantoprazole 40 mg oral delayed release tablet: 1 tab(s) orally once a day (before a meal) MDD:1  senna oral tablet: 2 tab(s) orally once a day (at bedtime) MDD:2  traMADol 50 mg oral tablet: 1 tab(s) orally every 8 hours MDD:3

## 2020-03-06 NOTE — DISCHARGE NOTE NURSING/CASE MANAGEMENT/SOCIAL WORK - NSDCPECAREGIVERED_GEN_ALL_CORE
carenotes on knee replacement, side effects ,FORCE  pamphlets. managing pain at home handout, exercise worksheet, carenotes on d/c medicatons carenotes on knee replacement, FORCE and side effects pamphlet, carenotes on d/c medications, exercise worksheet, managing pain at home handout

## 2020-03-06 NOTE — PHYSICAL THERAPY INITIAL EVALUATION ADULT - PLANNED THERAPY INTERVENTIONS, PT EVAL
bed mobility training/Patient left supine in bed in NAD, call bell in reach, all lines intact./gait training/strengthening/transfer training/balance training/ROM

## 2020-03-06 NOTE — DISCHARGE NOTE NURSING/CASE MANAGEMENT/SOCIAL WORK - PATIENT PORTAL LINK FT
You can access the FollowMyHealth Patient Portal offered by [facility] by registering at the following website: [email or web address]. By joining [facility]’s FollowMyHealth portal, you will also be able to view your health information using other applications (apps) compatible with our system.

## 2020-03-06 NOTE — PROGRESS NOTE ADULT - SUBJECTIVE AND OBJECTIVE BOX
ORTHO PROGRESS NOTE     Pt seen and examined at bedside, denies SOB, CP, Dizziness. N/V/D /HA.  No significant overnight events. Pain well controlled. Patient ambulated  with PT     Vital Signs Last 24 Hrs  T(C): 36.7 (06 Mar 2020 01:04), Max: 36.7 (05 Mar 2020 18:46)  T(F): 98.1 (06 Mar 2020 01:04), Max: 98.1 (05 Mar 2020 22:25)  HR: 89 (06 Mar 2020 01:04) (73 - 101)  BP: 126/67 (06 Mar 2020 01:04) (104/75 - 145/77)  BP(mean): 85 (05 Mar 2020 17:45) (79 - 97)  RR: 15 (06 Mar 2020 01:04) (9 - 16)  SpO2: 98% (06 Mar 2020 01:04) (94% - 100%)    Gen: No apparent distress    right LE:  Dressing C/D I    BLE: motor intact EHL/FHL/TA/GS .  Sensation is grossly intact to light touch in the distal extremities.  Compartments are soft bilaterally.  Extremities are warm .  DP 2+ BLE     Labs:  CBC Full  -  ( 05 Mar 2020 16:20 )  WBC Count : 7.47 K/uL  RBC Count : 4.00 M/uL  Hemoglobin : 10.6 g/dL  Hematocrit : 33.8 %  Platelet Count - Automated : 171 K/uL  Mean Cell Volume : 84.5 fL  Mean Cell Hemoglobin : 26.5 pg  Mean Cell Hemoglobin Concentration : 31.4 %  Auto Neutrophil # : x  Auto Lymphocyte # : x  Auto Monocyte # : x  Auto Eosinophil # : x  Auto Basophil # : x  Auto Neutrophil % : x  Auto Lymphocyte % : x  Auto Monocyte % : x  Auto Eosinophil % : x  Auto Basophil % : x        03-05    139  |  104  |  9   ----------------------------<  100<H>  4.0   |  23  |  0.79    Ca    8.4      05 Mar 2020 16:20           A/P  s/p SANDRO-> right Knee Arthroplasty, POD #1    - Pain control/ Analgesia  - DVT ppx ASA 325bid foot pumps  - PT/OT - wbat/oob    - Incentive Spirometer  - Discharge planning    - notify Ortho for questions

## 2020-03-06 NOTE — DISCHARGE NOTE PROVIDER - CARE PROVIDER_API CALL
Tobin Pendleton)  Orthopaedic Surgery  [address] [place] [address], [facility] [address]  [place] [address]  Phone: [phone]  Fax: [phone]  Follow Up Time:

## 2020-03-06 NOTE — DISCHARGE NOTE PROVIDER - NSDCFUSCHEDAPPT_GEN_ALL_CORE_FT
SIGIFREDO VILLANUEVA ; 03/18/2020 ; [facility] SIGIFREDO VILLANUEVA ; 03/18/2020 ; [facility]

## 2020-03-06 NOTE — OCCUPATIONAL THERAPY INITIAL EVALUATION ADULT - MD ORDER
Occupational Therapy (OT) to evaluate and treat. Occupational Therapy (OT) to evaluate and treat. Per MAREK Arellano, pt is okay to participate in OT evaluation and perform activity as tolerated. Occupational Therapy (OT) to evaluate and treat. Out of Bed to Chair. Per MAREK Rivers, pt is okay to participate in OT evaluation and perform activity as tolerated.

## 2020-03-06 NOTE — OCCUPATIONAL THERAPY INITIAL EVALUATION ADULT - DIAGNOSIS, OT EVAL
s/p removal of hardware and Right Total Knee Replacement; Decreased functional mobility; Decreased ADL management

## 2020-03-06 NOTE — CONSULT NOTE ADULT - PROBLEM SELECTOR RECOMMENDATION 9
S/p hardware removal and right knee arthroplasty by orthopedic surgery on 3/5/20. Management per orthopedic surgery team.

## 2020-03-06 NOTE — DISCHARGE NOTE PROVIDER - HOSPITAL COURSE
49 Pt is s/p   SANDRO-> right TKA   without any intraoperative complications.  Pt is doing well and stable for discharge.  Pt is tolerating physical therapy: WBAT with cane/walker if needed, gait training.  Leave dressing on until post op office visit (POD#14). Have sutures/staples removed POD#14 if present.  Pt is on enteric coated ASA 325mg PO BID for DVT prophylaxis, take for 6 weeks unless otherwise instructed by surgeon.  follow up with Dr. Pendleton  in two weeks. Follow up with primary care doctor in 1-2 weeks for continuity of care.    The patient had no post-operative complications and clinically progressed faster than anticipated. The following medical conditions were actively treated during the hospital stay.      anxiety    The patient met criteria for discharge before the 2nd night of stay. The patient was safely and appropriately discharged home.

## 2020-03-06 NOTE — ASU PATIENT PROFILE, ADULT - PMH
[facility] EMERGENCY DEPT 
[address] [address] 
[phone] Work/School Note Date: 3/6/2020 To Whom It May concern: 
 
Kev Calvillo was seen and treated today in the emergency room by the following provider(s): 
Attending Provider: Pepe Ybarra MD.   
 
Kev Calvillo may return to work on 03/08/2020.  
 
Sincerely, 
 
 
 
 
Tika Ramsey MD 
 
 
 

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 No surgery  Sciatica    Sickle cell trait

## 2020-03-11 DIAGNOSIS — Z71.89 OTHER SPECIFIED COUNSELING: ICD-10-CM

## 2020-03-12 NOTE — PATIENT PROFILE ADULT - FUNCTIONAL SCREEN CURRENT LEVEL: EATING, MLM
No complaints  VS reviewed  Discussed IOL at 39 wks  Kick counts
Pt with no c/o today; states baby is active and meeting daily kick counts. Patient denies vaginal bleeding and leaking of fluid/ occasional ctx.
0 = independent

## 2020-03-14 RX ORDER — OXYCODONE AND ACETAMINOPHEN 2.5; 325 MG/1; MG/1
2.5-325 TABLET ORAL EVERY 6 HOURS
Qty: 40 | Refills: 0 | Status: ACTIVE | COMMUNITY
Start: 2020-03-14 | End: 1900-01-01

## 2020-03-18 ENCOUNTER — APPOINTMENT (OUTPATIENT)
Dept: ORTHOPEDIC SURGERY | Facility: CLINIC | Age: 50
End: 2020-03-18
Payer: MEDICAID

## 2020-03-18 VITALS
DIASTOLIC BLOOD PRESSURE: 78 MMHG | BODY MASS INDEX: 26.58 KG/M2 | TEMPERATURE: 97.8 F | WEIGHT: 150 LBS | SYSTOLIC BLOOD PRESSURE: 150 MMHG | HEART RATE: 94 BPM | HEIGHT: 63 IN

## 2020-03-18 LAB — SURGICAL PATHOLOGY STUDY: SIGNIFICANT CHANGE UP

## 2020-03-18 PROCEDURE — 73562 X-RAY EXAM OF KNEE 3: CPT | Mod: RT

## 2020-03-18 PROCEDURE — 99024 POSTOP FOLLOW-UP VISIT: CPT

## 2020-03-18 NOTE — ED ADULT NURSE NOTE - CCCP TRG CHIEF CMPLNT
CHIEF COMPLAINT: Back Pain (Back pain, seen UC 02/07/20. Ongoing pain in lower back. )      HISTORY OF PRESENT ILLNESS: Milo Ferreira is a 33 year old male who presents for the following issues:    1. Patient was evaluated in urgent care about a month and a half ago for right upper back pain. He was given a prescription for tizanidine and advised to do heating and icing, along with up to 600mg ibuprofen every 6 hours. He states that it was helpful. He reports that tizanidine does not touch his current pain.    Patient states that he's had occasional left low back over the past several months, slowly has gotten worse and worse. Typically it would just go away with rest, and was seeing a chiropractor regularly which helped a lot. He states that his current episode started six days ago, it started in the evening. He has pain radiating down his left lateral thigh down to the knee. He states that the leg feels weak in that he has difficulty lifting the leg when laying on his back. He also notices that if he's down on his right knee if he tries to push off his left leg to stand he notices that the left leg is weaker than the right. He had a massage therapist work on his left low back which didn't help much so she recommended he get in to have it evaluated. Denies loss of control of bowels or bladder. He denies any distant injury to his back. Rates pain as 7/10 at its worst, 3-4/10 at rest. Twisting or moving in certain ways will make the pain worse. Bouncing on a pothole while driving will cause him to have a shooting pain.     There are no active problems to display for this patient.      Past Surgical History:   Procedure Laterality Date   • No past surgeries          Family History   Problem Relation Age of Onset   • Patient is unaware of any medical problems Mother    • Patient is unaware of any medical problems Father        Social History     Socioeconomic History   • Marital status: /Civil Union     Spouse  cough name: Not on file   • Number of children: Not on file   • Years of education: Not on file   • Highest education level: Not on file   Occupational History   • Not on file   Social Needs   • Financial resource strain: Not on file   • Food insecurity:     Worry: Not on file     Inability: Not on file   • Transportation needs:     Medical: Not on file     Non-medical: Not on file   Tobacco Use   • Smoking status: Never Smoker   • Smokeless tobacco: Never Used   Substance and Sexual Activity   • Alcohol use: Yes     Comment: rare socially   • Drug use: Never   • Sexual activity: Not on file   Lifestyle   • Physical activity:     Days per week: Not on file     Minutes per session: Not on file   • Stress: Not on file   Relationships   • Social connections:     Talks on phone: Not on file     Gets together: Not on file     Attends Voodoo service: Not on file     Active member of club or organization: Not on file     Attends meetings of clubs or organizations: Not on file     Relationship status: Not on file   • Intimate partner violence:     Fear of current or ex partner: Not on file     Emotionally abused: Not on file     Physically abused: Not on file     Forced sexual activity: Not on file   Other Topics Concern   • Not on file   Social History Narrative   • Not on file        Current Outpatient Medications   Medication Sig Dispense Refill   • tizanidine (ZANAFLEX) 6 MG capsule Take 1 capsule by mouth 3 times daily. 20 capsule 0   • predniSONE (DELTASONE) 20 MG tablet Take 2 tablets by mouth daily. 10 tablet 0     No current facility-administered medications for this visit.         ALLERGIES:  No Known Allergies    Most Recent Immunizations   Administered Date(s) Administered   • DTaP(INFANRIX) 06/23/1992   • HIB, Unspecified Formulation 07/22/1988   • MMR 06/23/1992   • Polio, Unspecified Formulation 06/23/1992   • Tdap 02/08/2012       REVIEW OF SYSTEMS:            MUSCULOSKELETAL: Reports back pain as  above.  NEUROLOGIC:  Denies numbness/tingling but reports subjective weakness in left leg.    Objective:  VITALS:    Visit Vitals  /86   Pulse 68   Temp 97.9 °F (36.6 °C) (Oral)   Wt 95.8 kg   BMI 25.71 kg/m²       PHYSICAL EXAMINATION:    General:  conversant and in no acute distress  HEENT:   normocephalic and atraumatic  Musculoskeletal: no edema in bilateral lower extremities, no digital cyanosis or clubbing. Non-tender on palpation of lumbar spine, tender on palpation of left lumbar paraspinal musculature. Positive straight leg raise test on the left, negative on right. Negatibve MAXIMO test bilaterally.   Neurologic:   speech fluent and cranial nerves intact. DTR 2+ in bilateral lower extremities. Strength 5/5 in bilateral lower extremities both proximally and distally.  Skin:   color and texture are normal and no bruises, rashes or lesions   Psychiatric: Mood: Appropriate; Alert and oriented x3    ASSESSMENT AND PLAN:    Chronic left-sided low back pain with left-sided sciatica  Patient reports subjective weakness, but no objective weakness noted on exam. We discussed that if he notices worsening weakness he should be re-evaluated and may need MRI. Will provide stronger dose of tizanidine. Provided prednisone burst. We discussed potential side effects of prednisone including hyperglycemia, mood changes, insomnia. Advised him to avoid over the counter NSAIDs while taking prednisone. With pain ongoing intermittently for months, I will refer him to physical therapy for further evaluation and treatment.   Patient was advised to notify me if there is no improvement in the next week, or if worsening at any point. He expressed understanding.    - tizanidine (ZANAFLEX) 6 MG capsule; Take 1 capsule by mouth 3 times daily.  Dispense: 20 capsule; Refill: 0  - predniSONE (DELTASONE) 20 MG tablet; Take 2 tablets by mouth daily.  Dispense: 10 tablet; Refill: 0  - SERVICE TO PHYSICAL THERAPY    FOLLOW UP:  Return in  about 1 month (around 4/18/2020) for annual health checkup.

## 2020-03-18 NOTE — HISTORY OF PRESENT ILLNESS
[5] : the patient reports pain that is 5/10 in severity [Clean/Dry/Intact] : clean, dry and intact [Healed] : healed [Chills] : no chills [Constipation] : no constipation [Diarrhea] : no diarrhea [Dysuria] : no dysuria [Fever] : no fever [Nausea] : no nausea [Vomiting] : no vomiting [Discharge] : absent of discharge [de-identified] : Pt returns for her first post op visit for her right TKR 3/5/20 . Pt is taking Tylenol, oxycodone and tramadol gabapentin, ASA. Pt has a dry Mepilex dressing over the right knee.Pt is having therapy daily at home.  Pt is using a walker to ambulate. [de-identified] : Right knee incision site well approximated.Staples intact. No acute neurovascular deficits. Range of motion from 5-85° no acute instability [de-identified] : Xrays of the right knee and AP lateral and skyline projections discloses totally implants in situ [de-identified] : STable postop right total knee [de-identified] : patient will transition to outpatient physical therapy.Reevaluation in 4 weeks to check her progress

## 2020-03-29 NOTE — PLAN
[FreeTextEntry1] : Ms. Lockwood is a 49 year old with symptomatic cholelithiasis. Will proceed with laparoscopic cholecystectomy on 8/6/19

## 2020-03-29 NOTE — DATA REVIEWED
[No studies available for review at this time.] : No studies available for review at this time. [FreeTextEntry1] : July 15, 2019 CT Abdomen and Pelvis reviewed remarkable for CBD 7mm, distended gallbladder, wall thickening. \par \par July 15, 2019 RUQ ultrasound reviewed: Contracted Gallbladder with gallstones.

## 2020-03-29 NOTE — HISTORY OF PRESENT ILLNESS
[de-identified] : 49F w/ PMH of DCIS, s/p b/l mastectomy w/ NIKITA, sickle cell trait, hx of hepatitis B, tubal ligation, presents today as a follow up visit after having presented to ED last week due to epigastric pain radiating to back. Pt started to have epigastric pain early AM, radiating to the back, postprandial usually, improved by Ibuprofen, not associated w/ n/v, and no other related sx. In the ED, pt was afebrile, had no leukocytosis, LFTs and Tbili wnl, and RUQ US showed cholelithiasis w/o wall thickening or pericholecystic fluid. CTAP obtained, showing distended GB, wall thickening, and mild dilatation of CBD to 0.7cm. Pain was improved by Ibuprofen again, was sent home w/ instructions to follow up at Surgery clinic.\par \par  [de-identified] : Pt states that 3 days after she was discharged from ED, she had similar epigastric pain again, improved by Motrin. Otherwise, pt has been well

## 2020-03-29 NOTE — ADDENDUM
[FreeTextEntry1] : Ms. Lockwood is a 49 year old with symptomatic cholelithiasis. Will proceed with laparoscopic cholecystectomy on 8/6/19\par \par Daniel Rdz MD\par NPI: [id number]

## 2020-03-29 NOTE — ASSESSMENT
[FreeTextEntry1] : 49F w/ recurrent episodes of biliary colic, upon imaging found to have cholelithiasis and distended gallbladder, now presenting to the clinic for elective laparoscopic cholecystectomy scheduling\par \par

## 2020-03-29 NOTE — PHYSICAL EXAM
[Normal Thyroid] : the thyroid was normal [Normal Breath Sounds] : Normal breath sounds [Normal Heart Sounds] : normal heart sounds [Normal Rate and Rhythm] : normal rate and rhythm [Alert] : alert [No Rash or Lesion] : No rash or lesion [Oriented to Person] : oriented to person [Oriented to Place] : oriented to place [Calm] : calm [Oriented to Time] : oriented to time [JVD] : no jugular venous distention  [Carotid Bruits] : no carotid bruits [Right Carotid Bruit] : no bruit heard over the right carotid [Left Carotid Bruit] : no bruit heard over the left carotid [Right Femoral Bruit] : no bruit heard over the right femoral artery [Left Femoral Bruit] : no bruit heard over the left femoral artery [Abdominal Masses] : No abdominal masses [Abdomen Tenderness] : ~T ~M No abdominal tenderness [Tender] : was nontender [Enlarged] : not enlarged [Stool Sample Taken] : No stool obtained  on rectal exam [Occult Blood Positive] : was negative for occult blood [Gross Blood] : no gross blood [Fecal Impaction] : no fecal impaction [de-identified] : NAD, well [de-identified] : NC/AT, no lymphadenopathy [de-identified] : mildly distended, no tenderness, no mass noted, no guarding [de-identified] : motor strength full [de-identified] : no skin conditions noted

## 2020-04-15 ENCOUNTER — APPOINTMENT (OUTPATIENT)
Dept: ORTHOPEDIC SURGERY | Facility: CLINIC | Age: 50
End: 2020-04-15
Payer: MEDICAID

## 2020-04-15 DIAGNOSIS — Z96.651 PRESENCE OF RIGHT ARTIFICIAL KNEE JOINT: ICD-10-CM

## 2020-04-15 PROCEDURE — 99024 POSTOP FOLLOW-UP VISIT: CPT

## 2020-04-15 NOTE — HISTORY OF PRESENT ILLNESS
[___ Weeks Post Op] : [unfilled] weeks post op [7] : the patient reports pain that is 7/10 in severity [Clean/Dry/Intact] : clean, dry and intact [Healed] : healed [Swelling] : swollen [Chills] : no chills [Diarrhea] : no diarrhea [Dysuria] : no dysuria [Fever] : no fever [Nausea] : no nausea [Vomiting] : no vomiting [Erythema] : not erythematous [Discharge] : absent of discharge [Dehiscence] : not dehisced [de-identified] : Pt returns for follow up for her right TKR. Pt's incisional line is intact. Pt has finished her course of ASA, pt is not attending physical therapy. She only performing her own physical therapy at home. Pt is taking tramadol for pain. [de-identified] : Physical examination discloses incision site well-healed.  Acute effusions or erythema.  Yovany limited at 7 to 85 degrees flexion with terminal tenderness [de-identified] : Postop right total knee arthroplasty with capsulitis [de-identified] : The patient was advised on appropriate home exercise program to stress extension as well as flexion stretching.  To the current COVID crisis the patient is resisting referral to outpatient physical therapy.  We will arrange for telehealth encounter in the next 2 weeks to check her progress

## 2020-04-27 ENCOUNTER — APPOINTMENT (OUTPATIENT)
Dept: ORTHOPEDIC SURGERY | Facility: CLINIC | Age: 50
End: 2020-04-27

## 2020-05-05 NOTE — ED PROVIDER NOTE - HEME LYMPH, MLM
Spoke with patient.  Patient was informed, last week, that he will need to complete HIV testing as part of his work-up for HCV treatment; per insurance requirements.  Patient states he will go on 5/7/20 to complete.  Patient will be notified of results when available.   No adenopathy or splenomegaly. No cervical or inguinal lymphadenopathy.

## 2020-08-03 ENCOUNTER — NON-APPOINTMENT (OUTPATIENT)
Age: 50
End: 2020-08-03

## 2020-08-03 ENCOUNTER — APPOINTMENT (OUTPATIENT)
Dept: OPHTHALMOLOGY | Facility: CLINIC | Age: 50
End: 2020-08-03
Payer: MEDICAID

## 2020-08-03 PROCEDURE — 92012 INTRM OPH EXAM EST PATIENT: CPT

## 2020-08-03 PROCEDURE — 92134 CPTRZ OPH DX IMG PST SGM RTA: CPT

## 2020-08-13 NOTE — ASU PATIENT PROFILE, ADULT - TOBACCO USE
Subjective:   Patient ID: Venus Caldwell is a 70 y.o. female.  Chief Complaint:  Follow-up      Patient presents follow-up on Diabetes mellitus and Hyperlipidemia and  Osteopenia   On metformin 500 mg daily, aspirin 81 mg daily, and Lipitor 40 mg daily.  Reports compliance.  Denies side effects.    No meds for osteopenia since overall fracture risk more low.  Last testing 2017.  Due for repeat bone density.      Last visit for chronic cough related to interstitial fibrosis with abnormal PFTs.  Has seen pulmonology.  Doing well.    Complains today of fatigue, increased urination, and mild increase in ankle swelling.        Current Outpatient Medications:     amLODIPine (NORVASC) 5 MG tablet, Take 1 tablet (5 mg total) by mouth once daily., Disp: 90 tablet, Rfl: 3    aspirin (ECOTRIN) 81 MG EC tablet, Take 1 tablet (81 mg total) by mouth once daily., Disp: , Rfl: 0    atorvastatin (LIPITOR) 40 MG tablet, Take 1 tablet (40 mg total) by mouth every evening., Disp: 90 tablet, Rfl: 3    betamethasone dipropionate (DIPROLENE) 0.05 % cream, as needed. , Disp: , Rfl:     candesartan (ATACAND) 16 MG tablet, Take 1 tablet (16 mg total) by mouth once daily., Disp: 30 tablet, Rfl: 6    cholecalciferol, vitamin D3, 5,000 unit Tab, Take 1,000 Units by mouth once daily. , Disp: , Rfl:     fluticasone (FLONASE) 50 mcg/actuation nasal spray, 1 spray by Each Nare route daily as needed for Rhinitis., Disp: , Rfl:     gabapentin (NEURONTIN) 300 MG capsule, Take 300 mg by mouth 3 (three) times daily. 300 mg TID, Disp: , Rfl:     ipratropium-albuteroL (COMBIVENT)  mcg/actuation inhaler, Inhale 1 puff into the lungs every 6 (six) hours as needed for Wheezing or Shortness of Breath (or Cough). Rescue, Disp: 4 g, Rfl: 0    levocetirizine (XYZAL) 5 MG tablet, Take 1 tablet (5 mg total) by mouth every evening., Disp: 30 tablet, Rfl: 11    meloxicam (MOBIC) 7.5 MG tablet, Take 1 tablet (7.5 mg total) by mouth 2 (two) times  daily., Disp: 180 tablet, Rfl: 3    metFORMIN (GLUCOPHAGE) 500 MG tablet, Take 1 tablet (500 mg total) by mouth daily with breakfast., Disp: 90 tablet, Rfl: 3    metoprolol succinate (TOPROL-XL) 100 MG 24 hr tablet, Take 1 tablet (100 mg total) by mouth 2 (two) times daily., Disp: 180 tablet, Rfl: 3    nortriptyline (PAMELOR) 50 MG capsule, Take 50 mg by mouth nightly. , Disp: , Rfl:     omeprazole (PRILOSEC) 20 MG capsule, Take 20 mg by mouth once daily., Disp: , Rfl:     valACYclovir (VALTREX) 500 MG tablet, Take 500 mg by mouth as needed. , Disp: , Rfl:     Facility-Administered Medications Ordered in Other Visits:     lactated ringers infusion, , Intravenous, Continuous, Elise Hammer MD, Stopped at 08/29/18 08    Review of Systems   Constitutional: Positive for fatigue. Negative for activity change, chills, diaphoresis, fever and unexpected weight change.   HENT: Negative for hearing loss, rhinorrhea and trouble swallowing.    Eyes: Negative for discharge and visual disturbance.   Respiratory: Negative for cough, chest tightness, shortness of breath and wheezing.    Cardiovascular: Positive for leg swelling. Negative for chest pain and palpitations.   Gastrointestinal: Negative for abdominal distention, abdominal pain, blood in stool, constipation, diarrhea, nausea and vomiting.   Endocrine: Positive for polyuria. Negative for polydipsia and polyphagia.   Genitourinary: Negative for difficulty urinating, dysuria, flank pain, frequency, hematuria, menstrual problem and urgency.   Musculoskeletal: Positive for joint swelling. Negative for arthralgias, myalgias and neck pain.   Skin: Negative for rash.   Neurological: Negative for dizziness, syncope, weakness, light-headedness, numbness and headaches.   Psychiatric/Behavioral: Negative for confusion, dysphoric mood and sleep disturbance. The patient is not nervous/anxious.      Objective:   /78 (BP Location: Right arm, Patient Position:  "Sitting, BP Method: Medium (Manual))   Pulse 78   Temp 98.6 °F (37 °C) (Tympanic)   Ht 5' 3" (1.6 m)   Wt 81.2 kg (179 lb 0.2 oz)   SpO2 97%   BMI 31.71 kg/m²     Physical Exam  Vitals signs and nursing note reviewed.   Constitutional:       Appearance: Normal appearance. She is well-developed and normal weight.   Eyes:      General: No scleral icterus.     Conjunctiva/sclera: Conjunctivae normal.      Right eye: Right conjunctiva is not injected.      Left eye: Left conjunctiva is not injected.   Neck:      Thyroid: No thyroid mass, thyromegaly or thyroid tenderness.      Vascular: No carotid bruit or JVD.   Cardiovascular:      Rate and Rhythm: Normal rate and regular rhythm.      Pulses:           Radial pulses are 2+ on the right side and 2+ on the left side.      Heart sounds: Normal heart sounds. No murmur. No friction rub. No gallop.    Pulmonary:      Effort: Pulmonary effort is normal.      Breath sounds: Normal breath sounds. No wheezing, rhonchi or rales.   Abdominal:      General: There is no distension.      Palpations: Abdomen is soft. There is no hepatomegaly.      Tenderness: There is no abdominal tenderness. There is no guarding or rebound.   Musculoskeletal:      Right lower leg: No edema.      Left lower leg: No edema.   Skin:     General: Skin is warm and dry.      Capillary Refill: Capillary refill takes less than 2 seconds.      Findings: No rash.   Neurological:      Mental Status: She is alert.      Coordination: Coordination is intact. Coordination normal.      Gait: Gait is intact. Gait normal.   Psychiatric:         Attention and Perception: Attention normal.         Mood and Affect: Mood and affect normal.         Speech: Speech normal.         Behavior: Behavior normal.         Thought Content: Thought content normal.         Cognition and Memory: Cognition normal.         Judgment: Judgment normal.       Assessment:       ICD-10-CM ICD-9-CM   1. Type 2 diabetes mellitus without " complication, without long-term current use of insulin  E11.9 250.00   2. Mixed diabetic hyperlipidemia associated with type 2 diabetes mellitus  E11.69 250.80    E78.2 272.2   3. Osteopenia, unspecified location  M85.80 733.90   4. Essential hypertension  I10 401.9   5. Fatigue, unspecified type  R53.83 780.79     Plan:   Type 2 diabetes mellitus without complication, without long-term current use of insulin  -     metFORMIN (GLUCOPHAGE) 500 MG tablet; Take 1 tablet (500 mg total) by mouth daily with breakfast.  Dispense: 90 tablet; Refill: 3  -     Hemoglobin A1C; Future; Expected date: 08/13/2020  -     Comprehensive metabolic panel; Future; Expected date: 08/13/2020  Adjust metformin 500 mg daily if needed     Mixed diabetic hyperlipidemia associated with type 2 diabetes mellitus  -     atorvastatin (LIPITOR) 40 MG tablet; Take 1 tablet (40 mg total) by mouth every evening.  Dispense: 90 tablet; Refill: 3  -     Lipid Panel; Future; Expected date: 08/13/2020  Controlled.  Stable.  Previous LDL at goal.    Continue aspirin 81 mg daily  Adjust Lipitor 40 mg daily if needed     Osteopenia, unspecified location  -     DXA Bone Density Spine And Hip; Future; Expected date: 08/13/2020  Continue vitamin-D and calcium supplementation  Bisphosphonate or other treatment if indicated.      Essential hypertension  Control.  BP at goal.    Continue present medications.      Fatigue, unspecified type  -     TSH; Future; Expected date: 08/13/2020  -     CBC Without Differential; Future; Expected date: 08/13/2020  Labs above.    Treat as indicated.    Follow-up all specialist as scheduled.  Return to clinic 6 months or sooner as needed.     Never smoker

## 2020-09-09 ENCOUNTER — NON-APPOINTMENT (OUTPATIENT)
Age: 50
End: 2020-09-09

## 2020-09-09 ENCOUNTER — APPOINTMENT (OUTPATIENT)
Dept: OPHTHALMOLOGY | Facility: CLINIC | Age: 50
End: 2020-09-09
Payer: MEDICAID

## 2020-09-09 PROCEDURE — 92134 CPTRZ OPH DX IMG PST SGM RTA: CPT

## 2020-09-09 PROCEDURE — 99213 OFFICE O/P EST LOW 20 MIN: CPT

## 2020-10-09 ENCOUNTER — NON-APPOINTMENT (OUTPATIENT)
Age: 50
End: 2020-10-09

## 2020-10-09 ENCOUNTER — APPOINTMENT (OUTPATIENT)
Dept: OPHTHALMOLOGY | Facility: CLINIC | Age: 50
End: 2020-10-09
Payer: MEDICAID

## 2020-10-09 PROCEDURE — 92012 INTRM OPH EXAM EST PATIENT: CPT

## 2021-02-26 ENCOUNTER — INPATIENT (INPATIENT)
Facility: HOSPITAL | Age: 51
LOS: 2 days | Discharge: HOME | End: 2021-03-01
Attending: HOSPITALIST | Admitting: HOSPITALIST
Payer: MEDICAID

## 2021-02-26 VITALS
SYSTOLIC BLOOD PRESSURE: 97 MMHG | OXYGEN SATURATION: 95 % | DIASTOLIC BLOOD PRESSURE: 64 MMHG | RESPIRATION RATE: 24 BRPM | HEART RATE: 122 BPM | HEIGHT: 63 IN | TEMPERATURE: 100 F

## 2021-02-26 DIAGNOSIS — Z90.13 ACQUIRED ABSENCE OF BILATERAL BREASTS AND NIPPLES: Chronic | ICD-10-CM

## 2021-02-26 DIAGNOSIS — Z90.49 ACQUIRED ABSENCE OF OTHER SPECIFIED PARTS OF DIGESTIVE TRACT: Chronic | ICD-10-CM

## 2021-02-26 LAB
BASOPHILS # BLD AUTO: 0.02 K/UL — SIGNIFICANT CHANGE UP (ref 0–0.2)
BASOPHILS NFR BLD AUTO: 0.2 % — SIGNIFICANT CHANGE UP (ref 0–2)
EOSINOPHIL # BLD AUTO: 0.01 K/UL — SIGNIFICANT CHANGE UP (ref 0–0.5)
EOSINOPHIL NFR BLD AUTO: 0.1 % — SIGNIFICANT CHANGE UP (ref 0–6)
HCT VFR BLD CALC: 43.5 % — SIGNIFICANT CHANGE UP (ref 34.5–45)
HGB BLD-MCNC: 13.6 G/DL — SIGNIFICANT CHANGE UP (ref 11.5–15.5)
IANC: 6.19 K/UL — SIGNIFICANT CHANGE UP (ref 1.5–8.5)
IMM GRANULOCYTES NFR BLD AUTO: 1.1 % — SIGNIFICANT CHANGE UP (ref 0–1.5)
LYMPHOCYTES # BLD AUTO: 1.28 K/UL — SIGNIFICANT CHANGE UP (ref 1–3.3)
LYMPHOCYTES # BLD AUTO: 15.3 % — SIGNIFICANT CHANGE UP (ref 13–44)
MCHC RBC-ENTMCNC: 25.1 PG — LOW (ref 27–34)
MCHC RBC-ENTMCNC: 31.3 GM/DL — LOW (ref 32–36)
MCV RBC AUTO: 80.3 FL — SIGNIFICANT CHANGE UP (ref 80–100)
MONOCYTES # BLD AUTO: 0.76 K/UL — SIGNIFICANT CHANGE UP (ref 0–0.9)
MONOCYTES NFR BLD AUTO: 9.1 % — SIGNIFICANT CHANGE UP (ref 2–14)
NEUTROPHILS # BLD AUTO: 6.19 K/UL — SIGNIFICANT CHANGE UP (ref 1.8–7.4)
NEUTROPHILS NFR BLD AUTO: 74.2 % — SIGNIFICANT CHANGE UP (ref 43–77)
NRBC # BLD: 0 /100 WBCS — SIGNIFICANT CHANGE UP
NRBC # FLD: 0 K/UL — SIGNIFICANT CHANGE UP
PLATELET # BLD AUTO: 271 K/UL — SIGNIFICANT CHANGE UP (ref 150–400)
RBC # BLD: 5.42 M/UL — HIGH (ref 3.8–5.2)
RBC # FLD: 15.1 % — HIGH (ref 10.3–14.5)
WBC # BLD: 8.35 K/UL — SIGNIFICANT CHANGE UP (ref 3.8–10.5)
WBC # FLD AUTO: 8.35 K/UL — SIGNIFICANT CHANGE UP (ref 3.8–10.5)

## 2021-02-26 PROCEDURE — 99285 EMERGENCY DEPT VISIT HI MDM: CPT

## 2021-02-26 PROCEDURE — 71045 X-RAY EXAM CHEST 1 VIEW: CPT | Mod: 26

## 2021-02-26 RX ORDER — ACETAMINOPHEN 500 MG
650 TABLET ORAL ONCE
Refills: 0 | Status: COMPLETED | OUTPATIENT
Start: 2021-02-26 | End: 2021-02-26

## 2021-02-26 RX ORDER — KETAMINE HYDROCHLORIDE 100 MG/ML
35 INJECTION INTRAMUSCULAR; INTRAVENOUS ONCE
Refills: 0 | Status: DISCONTINUED | OUTPATIENT
Start: 2021-02-26 | End: 2021-02-26

## 2021-02-26 RX ORDER — SODIUM CHLORIDE 9 MG/ML
500 INJECTION INTRAMUSCULAR; INTRAVENOUS; SUBCUTANEOUS ONCE
Refills: 0 | Status: COMPLETED | OUTPATIENT
Start: 2021-02-26 | End: 2021-02-26

## 2021-02-26 RX ORDER — DEXAMETHASONE 0.5 MG/5ML
6 ELIXIR ORAL ONCE
Refills: 0 | Status: COMPLETED | OUTPATIENT
Start: 2021-02-26 | End: 2021-02-26

## 2021-02-26 RX ADMIN — SODIUM CHLORIDE 500 MILLILITER(S): 9 INJECTION INTRAMUSCULAR; INTRAVENOUS; SUBCUTANEOUS at 23:16

## 2021-02-26 RX ADMIN — Medication 6 MILLIGRAM(S): at 23:23

## 2021-02-26 RX ADMIN — Medication 650 MILLIGRAM(S): at 23:23

## 2021-02-26 NOTE — ED PROVIDER NOTE - PHYSICAL EXAMINATION
Gen: Patient is nontoxic appearing, tachypneic, AAOx3.   HEENT: NCAT, EOMI, BARON, normal conjunctiva, tongue midline, oral mucosa moist.  Lung: CTAB, no respiratory distress, no wheezes/rhonchi/rales B/L, speaking in short sentences.  CV: RRR, no murmurs, rubs or gallops, distal pulses 2+ b/l.   Abd: soft, NT, ND, no guarding, no rigidity, no rebound tenderness.  MSK: no visible deformities, ROM normal in UE/LE, no back TTP.  Neuro: No focal sensory or motor deficits.  Skin: Warm, well perfused, no rash, no leg swelling.  Psych: normal affect, calm.

## 2021-02-26 NOTE — ED PROVIDER NOTE - OBJECTIVE STATEMENT
51 yo F with hx of breast ca s/p bilateral mastectomy p/w worsening sob, body aches and nonproductive cough x 1 week, COVID+ 2/21. Patient reports generalized fatigue and nonproductive cough for the last month worsening over the last week. Mother tested positive for COVID 2 weeks ago. She reports sob worse on exertion, present at rest. Denies chest pain, fevers, n/v/d/c.

## 2021-02-26 NOTE — ED ADULT TRIAGE NOTE - CHIEF COMPLAINT QUOTE
c/o generalized weakness, poor appetite, and SOB x 1 week. C/o dry cough x 3 weeks. Tested positive for COVID 2/21/21. GO noted. Hx breast cancer with bilateral mastectomy, right knee replacement

## 2021-02-26 NOTE — ED PROVIDER NOTE - PMH
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 No surgery  Sciatica    Sickle cell trait

## 2021-02-26 NOTE — ED PROVIDER NOTE - CLINICAL SUMMARY MEDICAL DECISION MAKING FREE TEXT BOX
49 yo F with hx of breast ca s/p bilateral mastectomy p/w worsening sob, body aches and nonproductive cough x 1 week, COVID+ 2/21. Patient nontoxic appearing, tachypneic, saturating low 90s at rest, 85-86% with mild exertion, tachycardic. Denies chest pain, n/v/d/c. Hypoxia likely 2/2 covid infection. Plan for labs, cxr, ekg, gentle hydration, and admit.

## 2021-02-26 NOTE — ED PROVIDER NOTE - ATTENDING CONTRIBUTION TO CARE
I have personally performed a face to face bedside history and physical examination of this patient. I have discussed the history, examination, review of systems, assessment and plan of management with the resident. I have reviewed the electronic medical record and amended it to reflect my history, review of systems, physical exam, assessment and plan.    Brief HPI:  51 yo F with hx of breast ca s/p bilateral mastectomy p/w worsening sob, body aches and nonproductive cough x 1 week, COVID+ 2/21.      Vitals:   Reviewed    Exam:    GEN:  Non-toxic appearing, non-distressed, speaking full sentences, non-diaphoretic, AAOx3  HEENT:  NCAT, neck supple, EOMI, PERRLA, sclera anicteric, no conjunctival pallor or injection, no stridor, normal voice, no tonsillar exudate, uvula midline, tympanic membranes and external auditory canals normal appearing bilaterally   CV:  regular rhythm and rate, s1/s2 audible, no murmurs, rubs or gallops, peripheral pulses 2+ and symmetric  PULM:  non-labored respirations, lungs clear to auscultation bilaterally, no wheezes, crackles or rales  ABD:  non distended, non-tender, no rebound, no guarding, negative Harvey's sign, bowel sounds normal, no cvat  MSK:  no gross deformity, non-tender extremities and joints, range of motion grossly normal appearing, no extremity edema, extremities warm and well perfused   NEURO:  AAOx3, CN II-XII intact, motor 5/5 in upper and lower extremities bilaterally, sensation grossly intact in extremities and trunk, finger to nose testing wnl, no nystagmus, negative Romberg, no pronator drift, no gait deficit  SKIN:  warm, dry, no rash or vesicles     A/P:  51 yo F with hx of breast ca s/p bilateral mastectomy p/w worsening sob, body aches and nonproductive cough x 1 week, COVID+ 2/21.  No hypoxia at rest but mid 80s on ra with ambulation.  Suspect covid-19.  Plan for labs, cxr, supportive care, decadron.  Anticipate admission due to hypoxia.

## 2021-02-27 DIAGNOSIS — A41.9 SEPSIS, UNSPECIFIED ORGANISM: ICD-10-CM

## 2021-02-27 DIAGNOSIS — Z02.9 ENCOUNTER FOR ADMINISTRATIVE EXAMINATIONS, UNSPECIFIED: ICD-10-CM

## 2021-02-27 DIAGNOSIS — N17.9 ACUTE KIDNEY FAILURE, UNSPECIFIED: ICD-10-CM

## 2021-02-27 DIAGNOSIS — U07.1 COVID-19: ICD-10-CM

## 2021-02-27 DIAGNOSIS — R09.02 HYPOXEMIA: ICD-10-CM

## 2021-02-27 DIAGNOSIS — J96.01 ACUTE RESPIRATORY FAILURE WITH HYPOXIA: ICD-10-CM

## 2021-02-27 DIAGNOSIS — Z29.9 ENCOUNTER FOR PROPHYLACTIC MEASURES, UNSPECIFIED: ICD-10-CM

## 2021-02-27 DIAGNOSIS — R74.01 ELEVATION OF LEVELS OF LIVER TRANSAMINASE LEVELS: ICD-10-CM

## 2021-02-27 DIAGNOSIS — R94.31 ABNORMAL ELECTROCARDIOGRAM [ECG] [EKG]: ICD-10-CM

## 2021-02-27 LAB
ALBUMIN SERPL ELPH-MCNC: 2.4 G/DL — LOW (ref 3.3–5)
ALBUMIN SERPL ELPH-MCNC: 2.8 G/DL — LOW (ref 3.3–5)
ALP SERPL-CCNC: 83 U/L — SIGNIFICANT CHANGE UP (ref 40–120)
ALP SERPL-CCNC: 92 U/L — SIGNIFICANT CHANGE UP (ref 40–120)
ALT FLD-CCNC: 16 U/L — SIGNIFICANT CHANGE UP (ref 4–33)
ALT FLD-CCNC: 37 U/L — HIGH (ref 4–33)
ANION GAP SERPL CALC-SCNC: 10 MMOL/L — SIGNIFICANT CHANGE UP (ref 7–14)
ANION GAP SERPL CALC-SCNC: 14 MMOL/L — SIGNIFICANT CHANGE UP (ref 7–14)
AST SERPL-CCNC: 60 U/L — HIGH (ref 4–32)
AST SERPL-CCNC: 73 U/L — HIGH (ref 4–32)
BASOPHILS # BLD AUTO: 0 K/UL — SIGNIFICANT CHANGE UP (ref 0–0.2)
BASOPHILS NFR BLD AUTO: 0 % — SIGNIFICANT CHANGE UP (ref 0–2)
BILIRUB SERPL-MCNC: 0.2 MG/DL — SIGNIFICANT CHANGE UP (ref 0.2–1.2)
BILIRUB SERPL-MCNC: 0.3 MG/DL — SIGNIFICANT CHANGE UP (ref 0.2–1.2)
BUN SERPL-MCNC: 23 MG/DL — SIGNIFICANT CHANGE UP (ref 7–23)
BUN SERPL-MCNC: 26 MG/DL — HIGH (ref 7–23)
CALCIUM SERPL-MCNC: 8.2 MG/DL — LOW (ref 8.4–10.5)
CALCIUM SERPL-MCNC: 9.1 MG/DL — SIGNIFICANT CHANGE UP (ref 8.4–10.5)
CHLORIDE SERPL-SCNC: 95 MMOL/L — LOW (ref 98–107)
CHLORIDE SERPL-SCNC: 99 MMOL/L — SIGNIFICANT CHANGE UP (ref 98–107)
CK SERPL-CCNC: 107 U/L — SIGNIFICANT CHANGE UP (ref 25–170)
CO2 SERPL-SCNC: 22 MMOL/L — SIGNIFICANT CHANGE UP (ref 22–31)
CO2 SERPL-SCNC: 26 MMOL/L — SIGNIFICANT CHANGE UP (ref 22–31)
CREAT SERPL-MCNC: 2.22 MG/DL — HIGH (ref 0.5–1.3)
CREAT SERPL-MCNC: 2.39 MG/DL — HIGH (ref 0.5–1.3)
CRP SERPL-MCNC: 68.7 MG/L — HIGH
D DIMER BLD IA.RAPID-MCNC: 239 NG/ML DDU — HIGH
EOSINOPHIL # BLD AUTO: 0 K/UL — SIGNIFICANT CHANGE UP (ref 0–0.5)
EOSINOPHIL NFR BLD AUTO: 0 % — SIGNIFICANT CHANGE UP (ref 0–6)
FERRITIN SERPL-MCNC: 288 NG/ML — HIGH (ref 15–150)
GLUCOSE SERPL-MCNC: 101 MG/DL — HIGH (ref 70–99)
GLUCOSE SERPL-MCNC: 146 MG/DL — HIGH (ref 70–99)
HCT VFR BLD CALC: 38.9 % — SIGNIFICANT CHANGE UP (ref 34.5–45)
HGB BLD-MCNC: 12.4 G/DL — SIGNIFICANT CHANGE UP (ref 11.5–15.5)
IANC: 4.88 K/UL — SIGNIFICANT CHANGE UP (ref 1.5–8.5)
LDH SERPL L TO P-CCNC: 848 U/L — HIGH (ref 135–225)
LYMPHOCYTES # BLD AUTO: 0.59 K/UL — LOW (ref 1–3.3)
LYMPHOCYTES # BLD AUTO: 9.9 % — LOW (ref 13–44)
MAGNESIUM SERPL-MCNC: 2.2 MG/DL — SIGNIFICANT CHANGE UP (ref 1.6–2.6)
MCHC RBC-ENTMCNC: 25.5 PG — LOW (ref 27–34)
MCHC RBC-ENTMCNC: 31.9 GM/DL — LOW (ref 32–36)
MCV RBC AUTO: 79.9 FL — LOW (ref 80–100)
MONOCYTES # BLD AUTO: 0.16 K/UL — SIGNIFICANT CHANGE UP (ref 0–0.9)
MONOCYTES NFR BLD AUTO: 2.7 % — SIGNIFICANT CHANGE UP (ref 2–14)
NEUTROPHILS # BLD AUTO: 5.11 K/UL — SIGNIFICANT CHANGE UP (ref 1.8–7.4)
NEUTROPHILS NFR BLD AUTO: 86.5 % — HIGH (ref 43–77)
PHOSPHATE SERPL-MCNC: 4.7 MG/DL — HIGH (ref 2.5–4.5)
PLATELET # BLD AUTO: 230 K/UL — SIGNIFICANT CHANGE UP (ref 150–400)
POTASSIUM SERPL-MCNC: 3.2 MMOL/L — LOW (ref 3.5–5.3)
POTASSIUM SERPL-MCNC: 5.8 MMOL/L — HIGH (ref 3.5–5.3)
POTASSIUM SERPL-MCNC: SIGNIFICANT CHANGE UP MMOL/L (ref 3.5–5.3)
POTASSIUM SERPL-SCNC: 3.2 MMOL/L — LOW (ref 3.5–5.3)
POTASSIUM SERPL-SCNC: 5.8 MMOL/L — HIGH (ref 3.5–5.3)
POTASSIUM SERPL-SCNC: SIGNIFICANT CHANGE UP MMOL/L (ref 3.5–5.3)
PROCALCITONIN SERPL-MCNC: 0.31 NG/ML — HIGH (ref 0.02–0.1)
PROT SERPL-MCNC: 7.4 G/DL — SIGNIFICANT CHANGE UP (ref 6–8.3)
PROT SERPL-MCNC: SIGNIFICANT CHANGE UP G/DL (ref 6–8.3)
RBC # BLD: 4.87 M/UL — SIGNIFICANT CHANGE UP (ref 3.8–5.2)
RBC # FLD: 15.3 % — HIGH (ref 10.3–14.5)
SARS-COV-2 RNA SPEC QL NAA+PROBE: DETECTED
SODIUM SERPL-SCNC: 131 MMOL/L — LOW (ref 135–145)
SODIUM SERPL-SCNC: 135 MMOL/L — SIGNIFICANT CHANGE UP (ref 135–145)
TROPONIN T, HIGH SENSITIVITY RESULT: <6 NG/L — SIGNIFICANT CHANGE UP
WBC # BLD: 5.91 K/UL — SIGNIFICANT CHANGE UP (ref 3.8–10.5)
WBC # FLD AUTO: 5.91 K/UL — SIGNIFICANT CHANGE UP (ref 3.8–10.5)

## 2021-02-27 PROCEDURE — 12345: CPT | Mod: NC

## 2021-02-27 PROCEDURE — 99223 1ST HOSP IP/OBS HIGH 75: CPT | Mod: GC

## 2021-02-27 RX ORDER — INFLUENZA VIRUS VACCINE 15; 15; 15; 15 UG/.5ML; UG/.5ML; UG/.5ML; UG/.5ML
0.5 SUSPENSION INTRAMUSCULAR ONCE
Refills: 0 | Status: DISCONTINUED | OUTPATIENT
Start: 2021-02-27 | End: 2021-03-01

## 2021-02-27 RX ORDER — SODIUM CHLORIDE 9 MG/ML
1000 INJECTION INTRAMUSCULAR; INTRAVENOUS; SUBCUTANEOUS
Refills: 0 | Status: DISCONTINUED | OUTPATIENT
Start: 2021-02-27 | End: 2021-03-01

## 2021-02-27 RX ORDER — CHOLECALCIFEROL (VITAMIN D3) 125 MCG
2000 CAPSULE ORAL DAILY
Refills: 0 | Status: DISCONTINUED | OUTPATIENT
Start: 2021-02-27 | End: 2021-03-01

## 2021-02-27 RX ORDER — ALBUTEROL 90 UG/1
2 AEROSOL, METERED ORAL EVERY 4 HOURS
Refills: 0 | Status: DISCONTINUED | OUTPATIENT
Start: 2021-02-27 | End: 2021-03-01

## 2021-02-27 RX ORDER — ACETAMINOPHEN 500 MG
650 TABLET ORAL EVERY 6 HOURS
Refills: 0 | Status: DISCONTINUED | OUTPATIENT
Start: 2021-02-27 | End: 2021-03-01

## 2021-02-27 RX ORDER — ENOXAPARIN SODIUM 100 MG/ML
40 INJECTION SUBCUTANEOUS DAILY
Refills: 0 | Status: DISCONTINUED | OUTPATIENT
Start: 2021-02-27 | End: 2021-03-01

## 2021-02-27 RX ORDER — ZINC SULFATE TAB 220 MG (50 MG ZINC EQUIVALENT) 220 (50 ZN) MG
220 TAB ORAL DAILY
Refills: 0 | Status: DISCONTINUED | OUTPATIENT
Start: 2021-02-27 | End: 2021-03-01

## 2021-02-27 RX ORDER — SODIUM CHLORIDE 9 MG/ML
1000 INJECTION INTRAMUSCULAR; INTRAVENOUS; SUBCUTANEOUS
Refills: 0 | Status: DISCONTINUED | OUTPATIENT
Start: 2021-02-27 | End: 2021-02-27

## 2021-02-27 RX ORDER — DEXAMETHASONE 0.5 MG/5ML
6 ELIXIR ORAL DAILY
Refills: 0 | Status: DISCONTINUED | OUTPATIENT
Start: 2021-02-27 | End: 2021-03-01

## 2021-02-27 RX ORDER — ASCORBIC ACID 60 MG
500 TABLET,CHEWABLE ORAL DAILY
Refills: 0 | Status: DISCONTINUED | OUTPATIENT
Start: 2021-02-27 | End: 2021-03-01

## 2021-02-27 RX ADMIN — Medication 500 MILLIGRAM(S): at 11:33

## 2021-02-27 RX ADMIN — Medication 1 TABLET(S): at 11:33

## 2021-02-27 RX ADMIN — SODIUM CHLORIDE 100 MILLILITER(S): 9 INJECTION INTRAMUSCULAR; INTRAVENOUS; SUBCUTANEOUS at 10:50

## 2021-02-27 RX ADMIN — SODIUM CHLORIDE 100 MILLILITER(S): 9 INJECTION INTRAMUSCULAR; INTRAVENOUS; SUBCUTANEOUS at 02:24

## 2021-02-27 RX ADMIN — ZINC SULFATE TAB 220 MG (50 MG ZINC EQUIVALENT) 220 MILLIGRAM(S): 220 (50 ZN) TAB at 11:33

## 2021-02-27 RX ADMIN — ENOXAPARIN SODIUM 40 MILLIGRAM(S): 100 INJECTION SUBCUTANEOUS at 11:33

## 2021-02-27 RX ADMIN — Medication 2000 UNIT(S): at 11:33

## 2021-02-27 NOTE — H&P ADULT - ASSESSMENT
Ms. Lockwood is a 49 yo F with a PMH of breast cancer s/p bilateral mastectomy, R TKA, and cholecystectomy presenting with COVID-19 pneumonia and KELLY.

## 2021-02-27 NOTE — H&P ADULT - NSHPLABSRESULTS_GEN_ALL_CORE
LABS:                          13.6   8.35  )-----------( 271      ( 26 Feb 2021 23:25 )             43.5       02-26    131<L>  |  95<L>  |  23  ----------------------------<  101<H>  TNP   |  26  |  2.22<H>    Ca    9.1      26 Feb 2021 23:25    TPro  TNP  /  Alb  2.8<L>  /  TBili  0.3  /  DBili  x   /  AST  73<H>  /  ALT  16  /  AlkPhos  92  02-26       LIVER FUNCTIONS - ( 26 Feb 2021 23:25 )  Alb: 2.8 g/dL / Pro: TNP g/dL / ALK PHOS: 92 U/L / ALT: 16 U/L / AST: 73 U/L / GGT: x                            Lactate Trend            CAPILLARY BLOOD GLUCOSE            EKG: Sinus tachycardia with qtc 507      RADIOLOGY & ADDITIONAL TESTS: Reviewed  CXR bilateral patchy opacities

## 2021-02-27 NOTE — ED ADULT NURSE NOTE - CHIEF COMPLAINT QUOTE
c/o generalized weakness, poor appetite, and SOB x 1 week. C/o dry cough x 3 weeks. Tested positive for COVID 2/21/21. OG noted. Hx breast cancer with bilateral mastectomy, right knee replacement

## 2021-02-27 NOTE — ED ADULT NURSE NOTE - OBJECTIVE STATEMENT
Patient is a 50y female, A&Ox4, ambulatory, steady gait observed, COVID19 positive 1 week ago, complaining of generalized weakness, poor appetite, right arm restriction band in place due to lymph node removal, bilateral mastectomy, shortness of breath worse on exertion and cough. oxygen saturation 95% at rest and on ambulation. Unable to initiate IV. MD initiated 18 gauge left forearm via ultrasound machine, labs drawn and sent. Stretcher in lowest position, wheels locked, side rails up, call bell in reach.

## 2021-02-27 NOTE — H&P ADULT - PROBLEM SELECTOR PLAN 2
Nasal canula T 101.4, , COVID-19 PNA  -500 ml bolus and 100 ml/hr  -Continue to monitor hemodynamics T 101.4, , COVID-19 PNA  -500 ml bolus and 100 ml/hr x 15 more hours  -Continue to monitor hemodynamics

## 2021-02-27 NOTE — H&P ADULT - PROBLEM SELECTOR PLAN 4
Cr 2.22 without known history of CKD  -IVF as above  -Urine sodium  -Urine creatinine  -Contact PCP Dr. Sayda Villagomez regarding baseline Cr  -F/u AM cr AST > ALT  -Likely 2/2 infection  -Less likely due to alcohol  -Continue to trend

## 2021-02-27 NOTE — H&P ADULT - NSICDXPASTMEDICALHX_GEN_ALL_CORE_FT
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 No surgery    Sciatica     Sickle cell trait

## 2021-02-27 NOTE — H&P ADULT - PROBLEM SELECTOR PLAN 5
AST > ALT  -Likely 2/2 infection  -Less likely due to alcohol  -Continue to trend Lovenox 40 daily QTc 507  -Hold home seroquel  -Recheck if considering starting other QTC prolonging meds QTc 507  -Hold home Seroquel  -Recheck if considering starting other QTC prolonging meds  -may need Psychiatry eval for alternative med  - avoid QTc prolonging meds
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
## 2021-02-27 NOTE — H&P ADULT - NSHPREVIEWOFSYSTEMS_GEN_ALL_CORE
REVIEW OF SYSTEMS:    CONSTITUTIONAL: No weakness, fevers or chills  EYES/ENT: No visual changes;  No vertigo or throat pain   NECK: No pain or stiffness  RESPIRATORY: No cough, wheezing, hemoptysis; No shortness of breath  CARDIOVASCULAR: No chest pain or palpitations  GASTROINTESTINAL: No abdominal pain; nausea, vomiting;  diarrhea or constipation. No hematemesis, melena or hematochezia.  GENITOURINARY: Dark urine, intermittent dysuria, no sensation of incomplete voiding  NEUROLOGICAL: No numbness or weakness  SKIN: No itching, burning, rashes, or lesions   All other review of systems is negative unless indicated above.

## 2021-02-27 NOTE — H&P ADULT - ATTENDING COMMENTS
50  year old female, with past history as above, being managed for Hypoxia, in the setting of COVID-19 infection.  CXR w/ bilateral opacities.  Started on dexamethasone in the ED due to hypoxia at 85 to 86%; continued for 9 more doses.  Supplemental oxygen PRN, albuterol HFA, DVT prophylaxis, albuterol, prone positioning if needed, incentive spirometry.  Patient endorses some improvement since admission.  Has kidney impairment (Cr = 2.2; previously 0.75 in 03/2020); urine lytes, IVF hydration.  Obtain collaterals re renal function from PMD.  F/U for improvement..  QTc prolonged at 507; holding Seroquel for now (evaluate for restart; may need Psych eval for possible alternative med).    All other management as prescribed.

## 2021-02-27 NOTE — H&P ADULT - PROBLEM SELECTOR PLAN 6
Lovenox 40 daily 1.  Name of PCP:  2.  PCP Contacted on Admission: [ ] Y    [ ] N    3.  PCP contacted at Discharge: [ ] Y    [ ] N    [ ] N/A  4.  Post-Discharge Appointment Date and Location:  5.  Summary of Handoff given to PCP:

## 2021-02-27 NOTE — H&P ADULT - PROBLEM SELECTOR PLAN 3
T 101.4, , COVID-19 PNA  -500 ml bolus and 100 ml/hr  -Continue to monitor hemodynamics Cr 2.22 without known history of CKD likely distributive due to infection versus poor PO intake  -IVF as above  -Urine sodium  -Urine creatinine  -Contact PCP Dr. Sayda Villagomez regarding baseline Cr  -F/u AM cr Cr 2.22 without known history of CKD (last Cr = 0.75 - 03/06/2020).  Likely distributive due to infection versus poor PO intake  -IVF as above  -Urine sodium  -Urine creatinine  -Contact PCP Dr. Sayda Villagomez regarding baseline Cr  -F/u AM cr

## 2021-02-27 NOTE — PATIENT PROFILE ADULT - FLU SEASON?
Detail Level: Detailed
Patient Specific Counseling (Will Not Stick From Patient To Patient): Patient instructed to use mupirocin BID on effected site
Detail Level: Generalized
Yes...

## 2021-02-27 NOTE — H&P ADULT - HISTORY OF PRESENT ILLNESS
Ms. Lockwood is a 49 yo F with a PMH of breast cancer s/p bilateral mastectomy, R TKA, and cholecystectomy Ms. Lockwood is a 49 yo F with a PMH of breast cancer s/p bilateral mastectomy, R TKA, and cholecystectomy presenting with dyspnea. She had a nonproductive cough without dyspnea for 1 month. Her mother tested positive for covid-19 2 weeks ago. This Monday, she tested positive as well when she began noticing worsening dyspnea and cough. She then began noticing loss of taste and decreased PO intake for the last 3 days. Over the last days, the dyspnea worsened as well to the point where she became winded walking to the bathroom or moving between rooms in her home. Because of the new onset dyspnea, she decided to present to the emergency room. She had no fevers, anosmia, sore throat, diarrhea, or abdominal pain. She is still drinking fluids although she is eating almost nothing for the last 3 days. Ms. Lockwood is a 51 yo F with a PMH of breast cancer s/p bilateral mastectomy, R TKR, and cholecystectomy presenting with dyspnea. She had a nonproductive cough without dyspnea for 1 month. Her mother tested positive for covid-19 2 weeks ago. This Monday, she tested positive as well when she began noticing worsening dyspnea and cough. She then began noticing loss of taste and decreased PO intake for the last 3 days. Over the last days, the dyspnea worsened as well to the point where she became winded walking to the bathroom or moving between rooms in her home. Because of the new onset dyspnea, she decided to present to the emergency room. She had no fevers, anosmia, sore throat, diarrhea, or abdominal pain. She is still drinking fluids although she is eating almost nothing for the last 3 days.

## 2021-02-27 NOTE — H&P ADULT - NSHPSOCIALHISTORY_GEN_ALL_CORE
No smoking  Periods of 3-4 drinks per day (while mourning grandmother), but presently 1-2 drinks intermittently  No other drug use

## 2021-02-27 NOTE — H&P ADULT - NSICDXPASTSURGICALHX_GEN_ALL_CORE_FT
PAST SURGICAL HISTORY:  History of  Section '  and 2007    History of mastectomy, bilateral 2013 with NIKITA    Normal vaginal delivery /      S/P cholecystectomy 2019    S/P knee surgery ' :  Right Knee Arthroscopy    S/P tubal ligation 2007

## 2021-02-27 NOTE — H&P ADULT - NSICDXFAMILYHX_GEN_ALL_CORE_FT
FAMILY HISTORY:  Family history of sickle cell anemia, son    Father  Still living? Unknown  Family history of sickle cell trait in father, Age at diagnosis: Age Unknown

## 2021-02-27 NOTE — H&P ADULT - NSHPPHYSICALEXAM_GEN_ALL_CORE
PHYSICAL EXAM:    Vital Signs Last 24 Hrs  T(C): 36.9 (27 Feb 2021 02:21), Max: 38.6 (26 Feb 2021 22:43)  T(F): 98.5 (27 Feb 2021 02:21), Max: 101.4 (26 Feb 2021 22:43)  HR: 76 (27 Feb 2021 04:16) (76 - 122)  BP: 107/76 (27 Feb 2021 04:16) (92/53 - 113/68)  BP(mean): 60 (26 Feb 2021 20:26) (60 - 60)  RR: 19 (27 Feb 2021 04:16) (17 - 24)  SpO2: 96% (27 Feb 2021 04:16) (95% - 98%)    General: No acute distress.  HEENT: NCAT.  PERRL.  EOMI.  No scleral icterus or injection.  Moist MM.  No oropharyngeal exudates.    Neck: Supple.  Full ROM.  No JVD.  No thyromegaly. No lymphadenopathy.   Heart: RRR.  Normal S1 and S2.  No murmurs, rubs, or gallops.   Lungs: CTAB. No wheezes, crackles, or rhonchi.    Abdomen: BS+, soft, NT/ND.  No organomegaly.  Skin: Warm and dry.  No rashes.  Extremities: No edema, clubbing, or cyanosis.  2+ peripheral pulses b/l.  Musculoskeletal: No deformities.  No spinal or paraspinal tenderness.  Neuro: A&Ox3.  5/5 strength in UE and LE b/l.  Tactile sensation intact in UE and LE b/l.

## 2021-02-28 LAB
ALBUMIN SERPL ELPH-MCNC: 2.6 G/DL — LOW (ref 3.3–5)
ALP SERPL-CCNC: 72 U/L — SIGNIFICANT CHANGE UP (ref 40–120)
ALT FLD-CCNC: 31 U/L — SIGNIFICANT CHANGE UP (ref 4–33)
ANION GAP SERPL CALC-SCNC: 11 MMOL/L — SIGNIFICANT CHANGE UP (ref 7–14)
AST SERPL-CCNC: 46 U/L — HIGH (ref 4–32)
BILIRUB DIRECT SERPL-MCNC: <0.2 MG/DL — SIGNIFICANT CHANGE UP (ref 0–0.2)
BILIRUB INDIRECT FLD-MCNC: SIGNIFICANT CHANGE UP MG/DL (ref 0–1)
BILIRUB SERPL-MCNC: <0.2 MG/DL — SIGNIFICANT CHANGE UP (ref 0.2–1.2)
BUN SERPL-MCNC: 25 MG/DL — HIGH (ref 7–23)
CALCIUM SERPL-MCNC: 8.2 MG/DL — LOW (ref 8.4–10.5)
CHLORIDE SERPL-SCNC: 105 MMOL/L — SIGNIFICANT CHANGE UP (ref 98–107)
CO2 SERPL-SCNC: 23 MMOL/L — SIGNIFICANT CHANGE UP (ref 22–31)
CREAT SERPL-MCNC: 1.89 MG/DL — HIGH (ref 0.5–1.3)
CRP SERPL-MCNC: 43.7 MG/L — HIGH
D DIMER BLD IA.RAPID-MCNC: 176 NG/ML DDU — SIGNIFICANT CHANGE UP
FERRITIN SERPL-MCNC: 225 NG/ML — HIGH (ref 15–150)
GLUCOSE SERPL-MCNC: 92 MG/DL — SIGNIFICANT CHANGE UP (ref 70–99)
HCT VFR BLD CALC: 36.8 % — SIGNIFICANT CHANGE UP (ref 34.5–45)
HGB BLD-MCNC: 11.8 G/DL — SIGNIFICANT CHANGE UP (ref 11.5–15.5)
MCHC RBC-ENTMCNC: 25.3 PG — LOW (ref 27–34)
MCHC RBC-ENTMCNC: 32.1 GM/DL — SIGNIFICANT CHANGE UP (ref 32–36)
MCV RBC AUTO: 79 FL — LOW (ref 80–100)
NRBC # BLD: 0 /100 WBCS — SIGNIFICANT CHANGE UP
NRBC # FLD: 0 K/UL — SIGNIFICANT CHANGE UP
PLATELET # BLD AUTO: 254 K/UL — SIGNIFICANT CHANGE UP (ref 150–400)
POTASSIUM SERPL-MCNC: 4 MMOL/L — SIGNIFICANT CHANGE UP (ref 3.5–5.3)
POTASSIUM SERPL-SCNC: 4 MMOL/L — SIGNIFICANT CHANGE UP (ref 3.5–5.3)
PROT SERPL-MCNC: 6.6 G/DL — SIGNIFICANT CHANGE UP (ref 6–8.3)
RBC # BLD: 4.66 M/UL — SIGNIFICANT CHANGE UP (ref 3.8–5.2)
RBC # FLD: 15.2 % — HIGH (ref 10.3–14.5)
SODIUM SERPL-SCNC: 139 MMOL/L — SIGNIFICANT CHANGE UP (ref 135–145)
WBC # BLD: 7.06 K/UL — SIGNIFICANT CHANGE UP (ref 3.8–10.5)
WBC # FLD AUTO: 7.06 K/UL — SIGNIFICANT CHANGE UP (ref 3.8–10.5)

## 2021-02-28 PROCEDURE — 99233 SBSQ HOSP IP/OBS HIGH 50: CPT | Mod: GC

## 2021-02-28 RX ADMIN — Medication 200 MILLIGRAM(S): at 06:08

## 2021-02-28 RX ADMIN — Medication 2000 UNIT(S): at 12:15

## 2021-02-28 RX ADMIN — Medication 200 MILLIGRAM(S): at 21:30

## 2021-02-28 RX ADMIN — ZINC SULFATE TAB 220 MG (50 MG ZINC EQUIVALENT) 220 MILLIGRAM(S): 220 (50 ZN) TAB at 12:15

## 2021-02-28 RX ADMIN — Medication 6 MILLIGRAM(S): at 05:19

## 2021-02-28 RX ADMIN — Medication 1 TABLET(S): at 12:15

## 2021-02-28 RX ADMIN — Medication 200 MILLIGRAM(S): at 12:15

## 2021-02-28 RX ADMIN — Medication 500 MILLIGRAM(S): at 12:15

## 2021-02-28 RX ADMIN — ENOXAPARIN SODIUM 40 MILLIGRAM(S): 100 INJECTION SUBCUTANEOUS at 12:15

## 2021-03-01 ENCOUNTER — TRANSCRIPTION ENCOUNTER (OUTPATIENT)
Age: 51
End: 2021-03-01

## 2021-03-01 VITALS
HEART RATE: 62 BPM | OXYGEN SATURATION: 97 % | TEMPERATURE: 98 F | DIASTOLIC BLOOD PRESSURE: 64 MMHG | SYSTOLIC BLOOD PRESSURE: 105 MMHG | RESPIRATION RATE: 18 BRPM

## 2021-03-01 LAB — TSH SERPL-MCNC: 0.56 UIU/ML — SIGNIFICANT CHANGE UP (ref 0.27–4.2)

## 2021-03-01 PROCEDURE — 99239 HOSP IP/OBS DSCHRG MGMT >30: CPT

## 2021-03-01 RX ORDER — CHOLECALCIFEROL (VITAMIN D3) 125 MCG
2000 CAPSULE ORAL
Qty: 0 | Refills: 0 | DISCHARGE
Start: 2021-03-01

## 2021-03-01 RX ORDER — QUETIAPINE FUMARATE 200 MG/1
1 TABLET, FILM COATED ORAL
Qty: 0 | Refills: 0 | DISCHARGE

## 2021-03-01 RX ORDER — ASCORBIC ACID 60 MG
1 TABLET,CHEWABLE ORAL
Qty: 0 | Refills: 0 | DISCHARGE
Start: 2021-03-01

## 2021-03-01 RX ORDER — ACETAMINOPHEN 500 MG
2 TABLET ORAL
Qty: 0 | Refills: 0 | DISCHARGE
Start: 2021-03-01

## 2021-03-01 RX ORDER — ASPIRIN/CALCIUM CARB/MAGNESIUM 324 MG
1 TABLET ORAL
Qty: 30 | Refills: 0
Start: 2021-03-01 | End: 2021-03-30

## 2021-03-01 RX ADMIN — Medication 1 TABLET(S): at 11:35

## 2021-03-01 RX ADMIN — Medication 2000 UNIT(S): at 11:35

## 2021-03-01 RX ADMIN — Medication 200 MILLIGRAM(S): at 05:35

## 2021-03-01 RX ADMIN — Medication 500 MILLIGRAM(S): at 11:35

## 2021-03-01 RX ADMIN — ENOXAPARIN SODIUM 40 MILLIGRAM(S): 100 INJECTION SUBCUTANEOUS at 11:35

## 2021-03-01 RX ADMIN — ZINC SULFATE TAB 220 MG (50 MG ZINC EQUIVALENT) 220 MILLIGRAM(S): 220 (50 ZN) TAB at 11:35

## 2021-03-01 RX ADMIN — Medication 6 MILLIGRAM(S): at 05:35

## 2021-03-01 NOTE — DISCHARGE NOTE NURSING/CASE MANAGEMENT/SOCIAL WORK - PATIENT PORTAL LINK FT
You can access the FollowMyHealth Patient Portal offered by [facility] by registering at the following website: [email or web address]. By joining [facility]’s FollowMyHealth portal, you will also be able to view your health information using other applications (apps) compatible with our system.

## 2021-03-01 NOTE — PROGRESS NOTE ADULT - PROBLEM SELECTOR PLAN 4
QTc 507  -Hold home Seroquel  -Recheck if considering starting other QTC prolonging meds  -may need Psychiatry eval for alternative med  - avoid QTc prolonging meds

## 2021-03-01 NOTE — PROGRESS NOTE ADULT - ATTENDING COMMENTS
Time spent on discharge 32 minutes coordinating discharge plan and discussing with patient and family.

## 2021-03-01 NOTE — DISCHARGE NOTE PROVIDER - CARE PROVIDER_API CALL
Antonio Villagomez (DO)  Family Medicine  [phone] [place] [address]  Phone: [phone]  Fax: [phone]  Follow Up Time:

## 2021-03-01 NOTE — PROGRESS NOTE ADULT - PROBLEM SELECTOR PLAN 2
Cr 2.22 without known history of CKD (last Cr = 0.75 - 03/06/2020).  Likely distributive due to infection versus poor PO intake. improving  -creat improved, down from 2.3 to 1.8  -s/p IVF  -encourage po intake
Cr 2.22 without known history of CKD (last Cr = 0.75 - 03/06/2020).  Likely distributive due to infection versus poor PO intake. improving  -s/p IVF  -encourage po intake
Cr 2.22 without known history of CKD (last Cr = 0.75 - 03/06/2020).  Likely distributive due to infection versus poor PO intake  -IVF as above

## 2021-03-01 NOTE — DISCHARGE NOTE PROVIDER - HOSPITAL COURSE
49 yo F with a PMH of breast cancer s/p bilateral mastectomy, R TKR, and cholecystectomy presenting with dyspnea. She had a nonproductive cough without dyspnea for 1 month. Her mother tested positive for covid-19 2 weeks ago. This Monday, she tested positive as well when she began noticing worsening dyspnea and cough. She then began noticing loss of taste and decreased PO intake for the last 3 days. Over the last days, the dyspnea worsened as well to the point where she became winded walking to the bathroom or moving between rooms in her home. Because of the new onset dyspnea, she decided to present to the emergency room. She had no fevers, anosmia, sore throat, diarrhea, or abdominal pain. She is still drinking fluids although she is eating almost nothing for the last 3 days. In ED pt had , BP- 97/64, Pulse Ox 95% on RA  with desaturation to 85% with mild exertion. Pt also noted to have fever 101.4 yesterday. CXR revealed- Patchy airspace opacities bilaterally, concerning for viral pneumonia such as COVID 19. COVID PCR returned positive  Pt's labs revealed elevated inflammatory markers which decreased on repeat and creatinine was 2.22. She was started on IV dexamethasone, no remdesivir due to elevated creat. Pt had her seroquel held due to prolnged qtc 507. Pt was evaluated by hospitalist and cleared for d/c, to complete 10 day course of steroid as outpt and f/u with her PCP and [facility]. 49 yo F with a PMH of breast cancer s/p bilateral mastectomy, R TKR, and cholecystectomy presenting with dyspnea. She had a nonproductive cough without dyspnea for 1 month. Her mother tested positive for covid-19 2 weeks ago. This Monday, she tested positive as well when she began noticing worsening dyspnea and cough. She then began noticing loss of taste and decreased PO intake for the last 3 days. Over the last days, the dyspnea worsened as well to the point where she became winded walking to the bathroom or moving between rooms in her home. Because of the new onset dyspnea, she decided to present to the emergency room. She had no fevers, anosmia, sore throat, diarrhea, or abdominal pain. She is still drinking fluids although she is eating almost nothing for the last 3 days. In ED pt had , BP- 97/64, Pulse Ox 95% on RA  with desaturation to 85% with mild exertion. Pt also noted to have fever 101.4 yesterday. CXR revealed- Patchy airspace opacities bilaterally, concerning for viral pneumonia such as COVID 19. COVID PCR returned positive  Pt's labs revealed elevated inflammatory markers which decreased on repeat and creatinine was 2.22. She was started on IV dexamethasone, no remdesivir due to elevated creat. Pt had her seroquel held due to prolnged qtc 507. Pt was able to ambulate around her room without becoming hypoxic, she was evaluated by hospitalist and cleared for d/c, to complete 10 day course of steroid as outpt, and f/u with her PCP and [facility].

## 2021-03-01 NOTE — DISCHARGE NOTE PROVIDER - NSDCMRMEDTOKEN_GEN_ALL_CORE_FT
SEROquel 100 mg oral tablet: 1 tab(s) orally once a day (at bedtime)   acetaminophen 325 mg oral tablet: 2 tab(s) orally every 6 hours, As needed, Temp greater or equal to 38C (100.4F)  ascorbic acid 500 mg oral tablet: 1 tab(s) orally once a day  Aspirin Enteric Coated 81 mg oral delayed release tablet: 1 tab(s) orally once a day MDD:81 mg  cholecalciferol oral tablet: 2000 unit(s) orally once a day  dexamethasone 6 mg oral tablet: 1 tab(s) orally once a day MDD:6 mg  hydrocodone-homatropine 5 mg-1.5 mg/5 mL oral syrup: 5 milliliter(s) orally every 6 hours, As Needed -Cough - for cough MDD:20 ml  Multiple Vitamins oral tablet: 1 tab(s) orally once a day

## 2021-03-01 NOTE — PROGRESS NOTE ADULT - PROBLEM SELECTOR PLAN 3
AST > ALT  -Likely 2/2 infection  -Less likely due to alcohol  -Continue to trend
AST > ALT  -Likely 2/2 infection  -Less likely due to alcohol  -Continue to trend
AST > ALT  -Likely 2/2 covid  -Less likely due to alcohol  -Continue to trend

## 2021-03-01 NOTE — PROGRESS NOTE ADULT - PROBLEM SELECTOR PLAN 1
COVID-19 pneumonia, on room air, ambulating o2 sat 96%  -C/w dexamethasone as hypoxic on exertion  -Hold on Remdesivir given GFR < 30  -Incentive spirometry, Albuterol HFA  -Prone positioning if needed  -DVT prophylaxis  -D/c home today to complete dexamethasone x10 day course through 3/8  -email to [email or web address] for outpt f/u
COVID-19 pneumonia, on room air  -C/w dexamethasone as hypoxic on exertion  -Hold on Remdesivir given GFR < 30  -Incentive spirometry, Albuterol HFA  -Prone positioning if needed  -DVT prophylaxis  -F/u baseline COVID-19 labs
COVID-19 pneumonia, on room air  -C/w dexamethasone as hypoxic on exertion  -Hold on Remdesivir given GFR < 30  -Incentive spirometry, Albuterol HFA  -Prone positioning if needed  -DVT prophylaxis  -F/u baseline COVID-19 labs

## 2021-03-01 NOTE — PROGRESS NOTE ADULT - SUBJECTIVE AND OBJECTIVE BOX
[facility] Division of [facility] Medicine  Tila Burkett MD  Pager [phone]      Patient is a 50y old  Female who presents with a chief complaint of Dyspnea (27 Feb 2021 16:22)      SUBJECTIVE / OVERNIGHT EVENTS:    No acute event o/n. Pt reports her appetite has improved. no diarrhea since yesterday. Satting well on RA at rest but sob when walking to the bathroom     ADDITIONAL REVIEW OF SYSTEMS:    RESPIRATORY: + cough. no wheezing, chills or hemoptysis; + shortness of breath  CARDIOVASCULAR: No chest pain, palpitations, dizziness, or leg swelling  GASTROINTESTINAL: No abdominal or epigastric pain. No nausea, vomiting, or hematemesis; No diarrhea or constipation. No melena or hematochezia.      MEDICATIONS  (STANDING):  ascorbic acid 500 milliGRAM(s) Oral daily  cholecalciferol 2000 Unit(s) Oral daily  dexAMETHasone  Injectable 6 milliGRAM(s) IV Push daily  enoxaparin Injectable 40 milliGRAM(s) SubCutaneous daily  influenza   Vaccine 0.5 milliLiter(s) IntraMuscular once  multivitamin 1 Tablet(s) Oral daily  sodium chloride 0.9%. 1000 milliLiter(s) (100 mL/Hr) IV Continuous <Continuous>  zinc sulfate 220 milliGRAM(s) Oral daily    MEDICATIONS  (PRN):  acetaminophen   Tablet .. 650 milliGRAM(s) Oral every 6 hours PRN Temp greater or equal to 38C (100.4F)  ALBUTerol    90 MICROgram(s) HFA Inhaler 2 Puff(s) Inhalation every 4 hours PRN Shortness of Breath and/or Wheezing  guaiFENesin   Syrup  (Sugar-Free) 200 milliGRAM(s) Oral every 6 hours PRN Cough  hydrocodone/homatropine Syrup 5 milliLiter(s) Oral every 6 hours PRN Cough      CAPILLARY BLOOD GLUCOSE        I&O's Summary    27 Feb 2021 07:01  -  28 Feb 2021 07:00  --------------------------------------------------------  IN: 600 mL / OUT: 0 mL / NET: 600 mL        PHYSICAL EXAM:  Vital Signs Last 24 Hrs  T(C): 36.4 (28 Feb 2021 12:24), Max: 37.3 (28 Feb 2021 05:15)  T(F): 97.5 (28 Feb 2021 12:24), Max: 99.2 (28 Feb 2021 05:15)  HR: 84 (28 Feb 2021 12:24) (84 - 100)  BP: 111/69 (28 Feb 2021 12:24) (106/60 - 124/67)  BP(mean): --  RR: 18 (28 Feb 2021 12:24) (18 - 20)  SpO2: 97% (28 Feb 2021 12:24) (95% - 100%)    CONSTITUTIONAL: NAD, well-developed, well-groomed  EYES: PERRLA; conjunctiva and sclera clear  ENMT: Moist oral mucosa, no pharyngeal injection or exudates; normal dentition  NECK: Supple, no palpable masses; no thyromegaly  RESPIRATORY: Normal respiratory effort; lungs are clear to auscultation bilaterally  CARDIOVASCULAR: Regular rate and rhythm, normal S1 and S2, no murmur/rub/gallop; No lower extremity edema; Peripheral pulses are 2+ bilaterally  ABDOMEN: Nontender to palpation, normoactive bowel sounds, no rebound/guarding; No hepatosplenomegaly  MUSCLOSKELETAL:  Normal gait; no clubbing or cyanosis of digits; no joint swelling or tenderness to palpation  PSYCH: A+O to person, place, and time; affect appropriate  NEUROLOGY: CN 2-12 are intact and symmetric; no gross sensory deficits;   SKIN: No rashes; no palpable lesions    LABS:                        11.8   7.06  )-----------( 254      ( 28 Feb 2021 06:37 )             36.8     02-28    139  |  105  |  25<H>  ----------------------------<  92  4.0   |  23  |  1.89<H>    Ca    8.2<L>      28 Feb 2021 06:37  Phos  4.7     02-27  Mg     2.2     02-27    TPro  6.6  /  Alb  2.6<L>  /  TBili  <0.2  /  DBili  <0.2  /  AST  46<H>  /  ALT  31  /  AlkPhos  72  02-28      CARDIAC MARKERS ( 27 Feb 2021 06:33 )  x     / x     / 107 U/L / x     / x                RADIOLOGY & ADDITIONAL TESTS:  Results Reviewed:   Imaging Personally Reviewed:  Electrocardiogram Personally Reviewed:    COORDINATION OF CARE:  Care Discussed with Consultants/Other Providers [Y/N]:  Prior or Outpatient Records Reviewed [Y/N]:  
[facility] Division of [facility] Medicine  Tila Burkett MD  Pager [phone]      Patient is a 50y old  Female who presents with a chief complaint of Dyspnea (27 Feb 2021 06:24)      SUBJECTIVE / OVERNIGHT EVENTS:    no fever o/n. Pt reports her sob has improved, now satting low 90s on RA. Still has non- bloody diarrhea but no abd pain. tolerating regular diet     ADDITIONAL REVIEW OF SYSTEMS:    RESPIRATORY: No cough, wheezing, chills or hemoptysis; No shortness of breath  CARDIOVASCULAR: No chest pain, palpitations, dizziness, or leg swelling  GASTROINTESTINAL: No abdominal or epigastric pain. + diarrhea No melena or hematochezia.      MEDICATIONS  (STANDING):  ascorbic acid 500 milliGRAM(s) Oral daily  cholecalciferol 2000 Unit(s) Oral daily  dexAMETHasone  Injectable 6 milliGRAM(s) IV Push daily  enoxaparin Injectable 40 milliGRAM(s) SubCutaneous daily  influenza   Vaccine 0.5 milliLiter(s) IntraMuscular once  multivitamin 1 Tablet(s) Oral daily  sodium chloride 0.9%. 1000 milliLiter(s) (100 mL/Hr) IV Continuous <Continuous>  zinc sulfate 220 milliGRAM(s) Oral daily    MEDICATIONS  (PRN):  acetaminophen   Tablet .. 650 milliGRAM(s) Oral every 6 hours PRN Temp greater or equal to 38C (100.4F)  ALBUTerol    90 MICROgram(s) HFA Inhaler 2 Puff(s) Inhalation every 4 hours PRN Shortness of Breath and/or Wheezing      CAPILLARY BLOOD GLUCOSE        I&O's Summary      PHYSICAL EXAM:  Vital Signs Last 24 Hrs  T(C): 37 (27 Feb 2021 11:30), Max: 38.6 (26 Feb 2021 22:43)  T(F): 98.6 (27 Feb 2021 11:30), Max: 101.4 (26 Feb 2021 22:43)  HR: 89 (27 Feb 2021 11:30) (76 - 122)  BP: 99/60 (27 Feb 2021 11:30) (92/53 - 113/68)  BP(mean): 60 (26 Feb 2021 20:26) (60 - 60)  RR: 18 (27 Feb 2021 11:30) (17 - 24)  SpO2: 93% (27 Feb 2021 11:30) (93% - 98%)    CONSTITUTIONAL: NAD  EYES: PERRLA; conjunctiva and sclera clear  ENMT: Moist oral mucosa, no pharyngeal injection or exudates;   NECK: Supple, no palpable masses;  RESPIRATORY: Normal respiratory effort; basilar crackles bilaterally  CARDIOVASCULAR: Regular rate and rhythm, normal S1 and S2, no murmur/rub/gallop; No lower extremity edema; Peripheral pulses are 2+ bilaterally  ABDOMEN: Nontender to palpation, normoactive bowel sounds, no rebound/guarding;   MUSCLOSKELETAL:   no clubbing or cyanosis of digits; no joint swelling or tenderness to palpation  PSYCH: A+O to person, place, and time; affect appropriate  NEUROLOGY: CN 2-12 are intact and symmetric; no gross sensory deficits;   SKIN: No rashes; no palpable lesions    LABS:                        12.4   5.91  )-----------( 230      ( 27 Feb 2021 06:33 )             38.9     02-27    135  |  99  |  26<H>  ----------------------------<  146<H>  5.8<H>   |  22  |  2.39<H>    Ca    8.2<L>      27 Feb 2021 06:33  Phos  4.7     02-27  Mg     2.2     02-27    TPro  7.4  /  Alb  2.4<L>  /  TBili  0.2  /  DBili  x   /  AST  60<H>  /  ALT  37<H>  /  AlkPhos  83  02-27      CARDIAC MARKERS ( 27 Feb 2021 06:33 )  x     / x     / 107 U/L / x     / x                RADIOLOGY & ADDITIONAL TESTS:  Results Reviewed:   Imaging Personally Reviewed:  Electrocardiogram Personally Reviewed:    COORDINATION OF CARE:  Care Discussed with Consultants/Other Providers [Y/N]:  Prior or Outpatient Records Reviewed [Y/N]:  
Dr. Libia Gautam  Pager [phone]    PROGRESS NOTE:     Patient is a 50y old  Female who presents with a chief complaint of Dyspnea (28 Feb 2021 13:19)      SUBJECTIVE / OVERNIGHT EVENTS: pt denies chest pain, breathing better  ADDITIONAL REVIEW OF SYSTEMS: on RA, afebrile     MEDICATIONS  (STANDING):  ascorbic acid 500 milliGRAM(s) Oral daily  cholecalciferol 2000 Unit(s) Oral daily  dexAMETHasone  Injectable 6 milliGRAM(s) IV Push daily  enoxaparin Injectable 40 milliGRAM(s) SubCutaneous daily  influenza   Vaccine 0.5 milliLiter(s) IntraMuscular once  multivitamin 1 Tablet(s) Oral daily  sodium chloride 0.9%. 1000 milliLiter(s) (100 mL/Hr) IV Continuous <Continuous>  zinc sulfate 220 milliGRAM(s) Oral daily    MEDICATIONS  (PRN):  acetaminophen   Tablet .. 650 milliGRAM(s) Oral every 6 hours PRN Temp greater or equal to 38C (100.4F)  ALBUTerol    90 MICROgram(s) HFA Inhaler 2 Puff(s) Inhalation every 4 hours PRN Shortness of Breath and/or Wheezing  guaiFENesin   Syrup  (Sugar-Free) 200 milliGRAM(s) Oral every 6 hours PRN Cough  hydrocodone/homatropine Syrup 5 milliLiter(s) Oral every 6 hours PRN Cough      CAPILLARY BLOOD GLUCOSE        I&O's Summary    28 Feb 2021 07:01  -  01 Mar 2021 07:00  --------------------------------------------------------  IN: 1600 mL / OUT: 0 mL / NET: 1600 mL        PHYSICAL EXAM:  Vital Signs Last 24 Hrs  T(C): 36.7 (01 Mar 2021 10:04), Max: 36.7 (01 Mar 2021 10:04)  T(F): 98 (01 Mar 2021 10:04), Max: 98 (01 Mar 2021 10:04)  HR: 62 (01 Mar 2021 10:04) (62 - 87)  BP: 105/64 (01 Mar 2021 10:04) (100/60 - 111/69)  BP(mean): --  RR: 18 (01 Mar 2021 10:04) (17 - 18)  SpO2: 97% (01 Mar 2021 10:04) (96% - 97%)  CONSTITUTIONAL: NAD, well-developed  RESPIRATORY: Normal respiratory effort; lungs are clear to auscultation bilaterally  CARDIOVASCULAR: Regular rate and rhythm, normal S1 and S2, no murmur/rub/gallop; No lower extremity edema; Peripheral pulses are 2+ bilaterally  ABDOMEN: Nontender to palpation, normoactive bowel sounds, no rebound/guarding; No hepatosplenomegaly  MUSCULOSKELETAL: no clubbing or cyanosis of digits; no joint swelling or tenderness to palpation  PSYCH: A+O to person, place, and time; affect appropriate    LABS:                        11.8   7.06  )-----------( 254      ( 28 Feb 2021 06:37 )             36.8     02-28    139  |  105  |  25<H>  ----------------------------<  92  4.0   |  23  |  1.89<H>    Ca    8.2<L>      28 Feb 2021 06:37    TPro  6.6  /  Alb  2.6<L>  /  TBili  <0.2  /  DBili  <0.2  /  AST  46<H>  /  ALT  31  /  AlkPhos  72  02-28        RADIOLOGY & ADDITIONAL TESTS:  Results Reviewed:   Imaging Personally Reviewed:  Electrocardiogram Personally Reviewed:    COORDINATION OF CARE:  Care Discussed with Consultants/Other Providers [Y/N]: josefina [email or web address]/lily home today on dexamethasone to complete 10 day course, email [email or web address]  Prior or Outpatient Records Reviewed [Y/N]:

## 2021-03-01 NOTE — DISCHARGE NOTE NURSING/CASE MANAGEMENT/SOCIAL WORK - NSTRANSFERBELONGINGSRESP_GEN_A_NUR
[facility] Emergency Department.
yes

## 2021-03-01 NOTE — PROGRESS NOTE ADULT - ASSESSMENT
Ms. Lockwood is a 49 yo F with a PMH of breast cancer s/p bilateral mastectomy, R TKA, and cholecystectomy presenting with COVID-19 pneumonia and KELLY.
Ms. Lockwood is a 51 yo F with a PMH of breast cancer s/p bilateral mastectomy, R TKA, and cholecystectomy presenting with COVID-19 pneumonia and KELLY.
Ms. Lockwood is a 49 yo F with a PMH of breast cancer s/p bilateral mastectomy, R TKA, and cholecystectomy presenting with COVID-19 pneumonia and KELLY.

## 2021-03-01 NOTE — DISCHARGE NOTE PROVIDER - NSDCCPCAREPLAN_GEN_ALL_CORE_FT
PRINCIPAL DISCHARGE DIAGNOSIS  Diagnosis: Hypoxia  Assessment and Plan of Treatment:       SECONDARY DISCHARGE DIAGNOSES  Diagnosis: COVID-19  Assessment and Plan of Treatment:      PRINCIPAL DISCHARGE DIAGNOSIS  Diagnosis: 2019 novel coronavirus disease (COVID-19)  Assessment and Plan of Treatment:       SECONDARY DISCHARGE DIAGNOSES  Diagnosis: Acute kidney injury due to COVID-19  Assessment and Plan of Treatment:      PRINCIPAL DISCHARGE DIAGNOSIS  Diagnosis: 2019 novel coronavirus disease (COVID-19)  Assessment and Plan of Treatment: You have been diagnosed with the COVID-19 virus during your hospital stay. You must self quarantine to complete a 14 day time period.  Monitor for fevers, shortness of breath and cough primarily.  Monitor your temperature daily to note any changes and increases.    It has been determined that you no longer need hospitalization and can recover while remaining in self-quarantine at home. You should follow the prevention steps below until a healthcare provider or local or state health department says you can return to your normal activities. Please continue using your incentive spirometer 10x every hour while awake.  1. You should restrict activities outside your home, except for getting medical care.  2. Do not go to work, school, or public areas.  3. Avoid using public transportation, ride-sharing, or taxis.  4. Separate yourself from other people and animals in your home.  5. Call ahead before visiting your doctor.  6. Wear a facemask.  7. Cover your coughs and sneezes.  8. Clean your hands often.  9. Avoid sharing personal household items.  10. Clean all “high-touch” surfaces everyday.  11. Monitor your symptoms.  If you have a medical emergency and need to call 911, notify the dispatch personnel that you have COVID-19 If possible, put on a facemask before emergency medical services arrive.  12. Stopping home isolation.  Patients with confirmed COVID-19 should remain under home isolation precautions for 14 days since the positive COVID-19 test and until the risk of secondary transmission to others is thought to be low. The decision to discontinue home isolation precautions should be made on a case-by-case basis, in consultation with healthcare providers and state and local health departments. Your [facility] Department of Health can be reached at [phone] for further information about COVID-19.      SECONDARY DISCHARGE DIAGNOSES  Diagnosis: Prolonged QT interval  Assessment and Plan of Treatment: Prolonged QT interval- repeat EKG with PMD to see when it will be safe to restart seroquel.    Diagnosis: Acute kidney injury due to COVID-19  Assessment and Plan of Treatment: Avoid kidney toxic medications, including NSAIDs like ibuprofen, aleve, motrin. Drink plenty of water for hydration. Repeat blood tests with PMD in 1-2 weeks.

## 2021-03-02 RX ORDER — DEXAMETHASONE 0.5 MG/5ML
1 ELIXIR ORAL
Qty: 6 | Refills: 0
Start: 2021-03-02 | End: 2021-03-07

## 2021-04-28 NOTE — ASU PATIENT PROFILE, ADULT - PAIN SCALE PREFERRED, PROFILE
Anesthesia Post-op Note      Patient: Jones Bourgeois      Vital Last Value   Temperature 36.2 °C (97.1 °F) (04/28/21 0834)   Pulse 70 (04/28/21 0834)   Respiratory 18 (04/28/21 0834)   Non-Invasive  Blood Pressure 128/76 (04/28/21 0834)   Arterial   Blood Pressure     Pulse Oximetry 96 % (04/28/21 0834)     Mental status recovered: patient participates in evaluation.  VS noted and are stable.  Respiratory function satisfactory; airway patent.  Cardiovascular function and hydration status satisfactory.  Adequate pain control.  Nausea and vomiting control satisfactory.  No apparent anesthetic complications.  Final Anesthesia Post-op Assessment    Patient: Jones Bourgeois  Procedure(s) Performed: COLONOSCOPY WITH POSSIBLE BIOPSY, RT NEEDED  Anesthesia type: MAC    Vitals Value Taken Time   Temp 36.2 °C (97.1 °F) 04/28/21 0834   Pulse 70 04/28/21 0834   Resp 18 04/28/21 0834   SpO2 96 % 04/28/21 0834   /76 04/28/21 0834            No complications documented.    numerical 0-10

## 2021-07-20 NOTE — H&P ADULT - PROBLEM/PLAN-1
Follow-up visit with Dr. Wick in 2 weeks to discuss injection outcome and determine care plan going forward.       DISCHARGE INSTRUCTIONS    During office hours (8:00 a.m.- 4:00 p.m.) questions or concerns may be answered  by calling [facility] Navigation Nurses at  [phone].  Messages received after hours will be returned the following business day.      In the case of an emergency, please dial 911 or seek assistance at the nearest Emergency Room/Urgent Care facility.     All Patients:    ? You may experience an increase in your symptoms for the first 2 days (It may take anywhere between 2 days- 2 weeks for the steroid to have maximum effect).    ? You may use ice on the injection site, as frequently as 20 minutes each hour if needed.    ? You may take your pain medicine.    ? You may continue taking your regular medication after your injection. If you have had a Medial Branch Block you may resume pain medication once your pain diary is completed.    ? You may shower. No swimming, tub bath or hot tub for 48 hours.  You may remove your bandaid/bandage as soon as you are home.    ? You may resume light activities, as tolerated.    ? Resume your usual diet as tolerated.    ? It is strongly advised that you do not drive for 1-3 hours post injection.    ? If you have had oral sedation:  Do not drive for 8 hours post injection.      ? If you have had IV sedation:  Do not drive for 24 hours post injection.  Do not operate hazardous machinery or make important personal/business decisions for 24 hours.      POSSIBLE STEROID SIDE EFFECTS (If steroid/cortisone was used for your procedure)    -If you experience these symptoms, it should only last for a short period      Swelling of the legs                Skin redness (flushing)       Mouth (oral) irritation     Blood sugar (glucose) levels              Sweats                      Mood changes    Headache    Sleeplessness    Weakened immune system for up to 14  days, which could increase the risk of amaury the COVID-19 virus and/or experiencing more severe symptoms of the disease, if exposed.    Decreased effectiveness of the flu vaccine if given within 2 weeks of the steroid.         POSSIBLE PROCEDURE SIDE EFFECTS  -Call the Spine Center if you are concerned    Increased Pain             Increased numbness/tingling        Nausea/Vomiting            Bruising/bleeding at site        Redness or swelling                                                Difficulty walking        Weakness             Fever greater than 100.5    *In the event of a severe headache after an epidural steroid injection that is relieved by lying down, please call the [facility] to speak with a clinical staff member*       DISPLAY PLAN FREE TEXT

## 2021-09-09 NOTE — ED PROVIDER NOTE - CARDIOVASCULAR NEGATIVE STATEMENT, MLM
Patient is interested in tobacco therapy program started today   Patient is already on Wellbutrin for anxiety/depression, x4 months  Patient states nicotine patches do not work, they cause red-rash, irritation of the skin  Patient does not chew gum, not interested in nicotine gum  We will attempt nicotine inhaler at this time     First follow-up will be Wednesday September 15th at 11:00 normal rate and rhythm, no chest pain and no edema.

## 2022-01-20 NOTE — H&P PST ADULT - TEMPERATURE IN CELSIUS (DEGREES C)
Pt's daughter called he tested positive for covid yesterday @ [place]. She is concerned about the care that he is receiving there with all his health concerns.  He did see the dr @ [place] yesterday and was prescribed a Z-venkatesh but the dr is not there today 36.8

## 2022-03-04 NOTE — PATIENT PROFILE ADULT. - MEDICATION ADMINISTRATION INFO, PROFILE
Outpatient Subjective & Objective      Chief Complaint: Preoperative evaulation, perioperative medical management, and complication reduction plan.     Functional Capacity: Able to climb a flight of stairs without CP SOB or Syncope.  Able to meet 4 METs.  Runs/Walks 1 time per week.  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 No pertinent past medical history.      Past Medical History Pertinent Negatives:   Diagnosis Date Noted    Anemia 03/04/2022    Anxiety 03/04/2022    Asthma 03/04/2022    Cancer 03/04/2022    CHF (congestive heart failure) 03/04/2022    COPD (chronic obstructive pulmonary disease) 03/04/2022    Coronary artery disease 03/04/2022    Deep vein thrombosis 03/04/2022    Depression 03/04/2022    Diabetes mellitus, type 2 03/04/2022    Disorder of kidney and ureter 03/04/2022    GERD (gastroesophageal reflux disease) 03/04/2022    Heart murmur 03/04/2022    Hyperlipidemia 03/04/2022    Hypertension 03/04/2022    Myocardial infarction 03/04/2022    Pulmonary embolism 03/04/2022    Seizures 03/04/2022    Stroke 03/04/2022    Thyroid disease 03/04/2022     History reviewed. No pertinent surgical history.    Review of Systems   Constitutional: Positive for fatigue. Negative for chills and fever.   HENT: Negative for trouble swallowing and voice change.    Eyes: Negative for photophobia and visual disturbance.        No acute visual changes   Respiratory: Negative for apnea, cough, chest tightness, shortness of breath and wheezing.         STOP bang  Score 0  Low risk CONRAD     Cardiovascular: Negative for chest pain, palpitations and leg swelling.   Gastrointestinal: Positive for  "abdominal distention, abdominal pain and nausea. Negative for blood in stool, constipation, diarrhea and vomiting.        NO FLD, hepatitis, cirrhosis  No BRB or black tarry stool     Endocrine: Negative for cold intolerance, heat intolerance, polydipsia, polyphagia and polyuria.   Genitourinary: Positive for frequency and urgency. Negative for difficulty urinating, dysuria, flank pain and hematuria.   Musculoskeletal: Negative for arthralgias, back pain, gait problem, joint swelling, myalgias, neck pain and neck stiffness.   Neurological: Positive for dizziness and headaches. Negative for tremors, seizures, syncope, weakness, light-headedness and numbness.   Psychiatric/Behavioral: Negative for suicidal ideas. The patient is not nervous/anxious.       VITALS  Visit Vitals  /76   Pulse 93   Temp 98.2 °F (36.8 °C) (Oral)   Resp 16   Ht 5' 1" (1.549 m)   Wt 51.7 kg (114 lb)   SpO2 99%   BMI 21.54 kg/m²          Physical Exam  Constitutional:       General: She is not in acute distress.     Appearance: Normal appearance. She is well-developed. She is not diaphoretic.   HENT:      Head: Normocephalic.      Mouth/Throat:      Pharynx: No oropharyngeal exudate.   Eyes:      General: Lids are normal.         Right eye: No discharge.         Left eye: No discharge.      Conjunctiva/sclera: Conjunctivae normal.   Neck:      Thyroid: No thyromegaly.      Vascular: No JVD.      Trachea: Trachea normal. No tracheal deviation.   Cardiovascular:      Rate and Rhythm: Normal rate and regular rhythm.      Pulses:           Carotid pulses are 2+ on the right side and 2+ on the left side.       Radial pulses are 2+ on the right side and 2+ on the left side.        Posterior tibial pulses are 2+ on the right side and 2+ on the left side.      Heart sounds: Normal heart sounds. No murmur heard.    No friction rub. No gallop.   Pulmonary:      Effort: Pulmonary effort is normal. No respiratory distress.      Breath sounds: Normal " breath sounds. No stridor. No wheezing or rales.   Chest:      Chest wall: No tenderness.   Abdominal:      General: Bowel sounds are normal. There is no distension.      Palpations: Abdomen is soft.      Tenderness: There is no abdominal tenderness. There is no guarding.   Musculoskeletal:         General: No tenderness or deformity. Normal range of motion.      Cervical back: Normal range of motion and neck supple.   Lymphadenopathy:      Head:      Right side of head: No submental, submandibular, tonsillar, preauricular, posterior auricular or occipital adenopathy.      Left side of head: No submental, submandibular, tonsillar, preauricular, posterior auricular or occipital adenopathy.      Cervical: No cervical adenopathy.      Right cervical: No superficial cervical adenopathy.     Left cervical: No superficial cervical adenopathy.   Skin:     General: Skin is warm and dry.      Capillary Refill: Capillary refill takes 2 to 3 seconds.      Coloration: Skin is not pale.      Findings: No erythema or rash.   Neurological:      Mental Status: She is alert and oriented to person, place, and time.      GCS: GCS eye subscore is 4. GCS verbal subscore is 5. GCS motor subscore is 6.      Motor: No abnormal muscle tone.      Coordination: Coordination normal.   Psychiatric:         Attention and Perception: Attention and perception normal.         Mood and Affect: Mood and affect normal.         Speech: Speech normal.         Behavior: Behavior normal. Behavior is cooperative.         Thought Content: Thought content normal.         Cognition and Memory: Cognition and memory normal.         Judgment: Judgment normal.          Significant Labs:  Lab Results   Component Value Date    WBC 5.18 03/04/2022    HGB 12.4 03/04/2022    HCT 39.2 03/04/2022     03/04/2022    ALT 11 03/04/2022    AST 14 03/04/2022     (L) 03/04/2022    K 3.8 03/04/2022     03/04/2022    CREATININE 0.6 03/04/2022    BUN 6  03/04/2022    CO2 27 03/04/2022       Diagnostic Studies: No relevant studies.    EKG:   No results found for this or any previous visit.    2D ECHO:  TTE:  No results found for this or any previous visit.    WU:  No results found for this or any previous visit.     Imaging     Active Cardiac Conditions: None      Revised Cardiac Risk Index   High -Risk Surgery  Intraperitoneal; Intrathoracic; suprainguinal vascular Yes- + 1 No- 0   History of Ischemic Heart Disease   (Hx of MI/positive exercise test/current chest pain due to ischemia/use of nitrate therapy/EKG with pathological Q waves) Yes- + 1 No- 0   History of CHF  (Pulmonary edema/bilateral rales or S3 gallop/PND/CXR showing pulmonary vascular redistribution) Yes- + 1 No- 0   History of CVA   (Prior stroke or TIA) Yes- + 1 No- 0   Pre-operative treatment with insulin Yes- + 1 No- 0   Pre-operative creatinine > 2mg/dl Yes- + 1 No- 0   Total:      Risk Status:  Estimated risk of cardiac complications after non-cardiac surgery using the Revised Cardiac Risk Index for Preoperative risk is 3.9 %      ARISCAT (Canet) risk index: Low: 1.6% risk of post-op pulmonary complications.    American Society of Anesthesiologists Physical Status classification (ASA): 2           No further cardiac workup needed prior to surgery.    Outpatient Subjective & Objective     no concerns

## 2022-03-10 NOTE — H&P PST ADULT - HOW PATIENT ADDRESSED, PROFILE
Diagnosis: difficulty walking, decreased tolerance to activity       Visit (# authorized):  8 per [facility] Medicare)                        Referring Physician: Elizabeth Rowe    Precautions: at risk of falls     Medication changes since last visit?:  No    Subjective: No new information to report. Objective:    Date  3/1 3/8 3/10        Visit Number  2 3 4        NMR  x         Ther EX x x x        Ther Act   x        Gait Training           CRM            Manual             Additional Treatment Information:    Therapeutic Exercise (30 mins):   Nu-step BLE only x10 mins level 4- goal direction based on SPM  Shuttle    B press (blue, silver, yellow) 3x10    SL press (yellow, silver)2 x10 B     Therapeutic activity (13 mins):   Sit<>stand training:    Seated in w/c practice scooting forward in chair- VCs for hand placement    Practice anterior wt shift by placing/picking up cones on floor (5) -2 bouts    PT in front assist through stick to promote anterior wt shift with full sit <>stand x2 reps    PT providing tactile cues to promote/maintain anterior wt shift   Family training provided with handout. Patient Education:  As above. See pt instructions. Assessment:   Pt requiring increased time for processing and motor planning. Very slow movement patterns resulting in increased time required to complete tasks. Pt's family verbalizes understanding of material taught today.      Plan:review sit <>stand  in future sessions:continue shuttle,continue upper trunk rotations, standing tasks unsupported    Charges: 2 TE, 1 TA       Total Timed Treatment: 43 min  Total Treatment Time: 43 min
pearl

## 2022-06-24 NOTE — ED PROVIDER NOTE - NS ED MD DISPO DIVISION
[facility] Caregiver called for medication refill for   -Head and Shoulders Shampoo                                                      Thanks OSIRIS

## 2022-09-14 ENCOUNTER — TRANSCRIPTION ENCOUNTER (OUTPATIENT)
Age: 52
End: 2022-09-14

## 2022-11-07 NOTE — ED PROVIDER NOTE - MEDICAL DECISION MAKING DETAILS
46 y/o F pt with a hx of an old right knee injury, c/o right knee pain. No new direct trauma. Obtain XR and out-pt with Ortho. Ensure TID

## 2022-12-16 NOTE — DISCHARGE NOTE PROVIDER - NS AS DC PROVIDER CONTACT Y/N MULTI
Psychoeducation Group Documentation    PATIENT'S NAME: Juan Ramon Mcgarry  MRN:   [id number]  :   2011  ACCT. NUMBER: [id number]  Yes

## 2022-12-21 NOTE — ED ADULT TRIAGE NOTE - CCCP TRG CHIEF CMPLNT
leg pain, cough
[FreeTextEntry1] : Asthma by hx, Post Clinical RSV\par Much improved post prednisone, Breo\par CXR negative\par Spirometry remains normal\par Lung exam is normal, normal sp02\par continue Breo for winter months, than de escalate\par follow up 4 months or sooner if needed\par

## 2023-02-10 NOTE — ASU PATIENT PROFILE, ADULT - SURGERY TIME
Efrain Blue, RN 2/10/2023 8:12 AM CST        ----- Message -----  From: Bashir Goncalves  Sent: 2/9/2023 9:14 PM CST  To: Em Rn Triage  Subject: Skin Check     Hi Dr. Talya Biswas,    I am writing to ask for a referral to a dermatologist. Probably almost 10 years ago I saw a dermatologist and they were going to keep an eye on a mole I had on my neck. Unfortunately I never had it rechecked and I am hoping you can get me a referral to get a full skin check.     Please let me know if I have to call or if an appointment with you has to be set up first.     -Leonela Medina 07:30

## 2023-05-24 NOTE — ASU PATIENT PROFILE, ADULT - NSALCOHOLPROBLEMSRELYN_GEN_A_CORE_SD
Soolantra Counseling: I discussed with the patients the risks of topial Soolantra. This is a medicine which decreases the number of mites and inflammation in the skin. You experience burning, stinging, eye irritation or allergic reactions.  Please call our office if you develop any problems from using this medication. no

## 2023-05-30 ENCOUNTER — EMERGENCY (EMERGENCY)
Facility: HOSPITAL | Age: 53
LOS: 1 days | Discharge: HOME | End: 2023-05-30
Admitting: EMERGENCY MEDICINE
Payer: MEDICAID

## 2023-05-30 VITALS
SYSTOLIC BLOOD PRESSURE: 119 MMHG | OXYGEN SATURATION: 100 % | RESPIRATION RATE: 18 BRPM | TEMPERATURE: 98 F | DIASTOLIC BLOOD PRESSURE: 70 MMHG | HEART RATE: 80 BPM

## 2023-05-30 DIAGNOSIS — Z90.49 ACQUIRED ABSENCE OF OTHER SPECIFIED PARTS OF DIGESTIVE TRACT: Chronic | ICD-10-CM

## 2023-05-30 DIAGNOSIS — Z90.13 ACQUIRED ABSENCE OF BILATERAL BREASTS AND NIPPLES: Chronic | ICD-10-CM

## 2023-05-30 PROCEDURE — 99284 EMERGENCY DEPT VISIT MOD MDM: CPT

## 2023-05-30 NOTE — ED PROVIDER NOTE - PATIENT PORTAL LINK FT
You can access the FollowMyHealth Patient Portal offered by [facility] by registering at the following website: [email or web address]. By joining [employer]’s FollowMyHealth portal, you will also be able to view your health information using other applications (apps) compatible with our system.

## 2023-05-30 NOTE — ED PROVIDER NOTE - OBJECTIVE STATEMENT
51 y/o postmenopausal F with hx of eczema here for medication refill of her Triamcinolone acetate cream for eczema rash    Pt states she missed her dermatology appt today as she went to the wrong office but rescheduled for 2 week. No new or concerning sxs. Rash has not worsened or spread. No pain. No facial involvement. no raised rash/blisters.     Denies facial swelling, tongue swelling, lip swelling, drooling, shortness of breath, chest tightness, neck pain or stiffness, ear pain, fevers, chills, cough, sore throat, abdominal pain, nausea, vomiting, diarrhea, urinary symptoms, leg swelling, headache, dizziness.  No new medications no new foods no new environmental factors.

## 2023-05-30 NOTE — ED PROVIDER NOTE - NSFOLLOWUPINSTRUCTIONS_ED_ALL_ED_FT
What is eczema?  Eczema is a skin condition that makes your skin itchy and flaky. Doctors do not know what causes it. Eczema often happens in people who have allergies. It can also run in families. Another term for eczema is "atopic dermatitis."    What are the symptoms of eczema?  The symptoms of eczema can include:    ?Intense itching    ?Color changes – In people with light skin, areas with eczema might look red or pink. In people with dark skin, they might appear dark brown, purple, or gray. Sometimes, there is a patch of skin that looks lighter than the skin around it.    ?Small bumps – These might look like dots or goosebumps (picture 1).    ?Skin that flakes off or forms scales (picture 2)    Most people with eczema have their first symptoms before they turn 5. But eczema can look different in people of different ages:    ?In babies and children younger than 2 years old, eczema tends to affect the front of the arms and legs, cheeks, or scalp (picture 3). (The diaper area is not usually affected.)    ?In older children and adults, eczema often affects the sides of the neck, the elbow creases, and the backs of the knees (picture 4). Adults can also get it on their wrists, hands, forearms, and face (picture 5).    ?In older children and adults, the skin can become thicker over time (picture 6), and can even form scars from too much scratching.    Is there a test for eczema?  No, there is no test. But doctors and nurses can tell if you have eczema by looking at your skin and by asking you questions.    What can I do to reduce my symptoms?  You can use unscented, thick moisturizing creams and ointments to keep the skin from getting too dry.    If possible, try to avoid or limit things that can make eczema worse. These include:    ?Being too hot or sweating too much    ?Being in very dry air    ?Stress or worry    ?Sudden temperature changes    ?Harsh soaps or cleaning products    ?Perfumes    ?Wool or synthetic fabrics (like polyester)    How is eczema treated?  There are treatments that can relieve the symptoms of eczema. But the condition cannot be cured. Even so, about half of children with eczema grow out of it by the time they become adults. Treatments for eczema include:    ?Moisturizing creams or ointments – These products help keep your skin moist. In some cases, your doctor or nurse might suggest using a moist dressing over special creams or medicines. It helps to put on your cream or ointment right after a bath or shower. Some people also try products that you put in the bathtub, such as oil or oatmeal. But these have been found not to help with eczema symptoms.    ?Steroid creams and ointments – These can help with itching and swelling. In severe cases, you might need steroids in pills. But your doctor or nurse will want you to stop taking steroid pills as soon as possible. Even though these medicines help, they can also cause problems of their own.    ?Antihistamine pills – Antihistamines are medicines that people often take for allergies. Some people with eczema find that antihistamines relieve itching. Others do not think that the medicines help with itching. Many people with eczema find that itching is worst at night. That can make it hard to sleep. If you have this problem, talk with your doctor or nurse about it. They might recommend an antihistamine that can also help you sleep.    ?Light therapy – Another treatment option is something called "light therapy," but doctors do not use it much. During light therapy, your skin is exposed to a special kind of light called ultraviolet light. This therapy is usually done in a doctor's office.    Doctors usually recommend light therapy for people who do not get better with other treatments.    ?Medicines that change the way that the immune system works – These medicines are only for people who do not get better with moisturizers and steroid creams or ointments.    Can eczema be prevented?  Experts don't know if there is a way to prevent eczema. Babies who have a parent or sibling with eczema have a higher risk of getting it. For these babies, good skin care might be helpful, especially in cold or dry areas. Good skin care includes using moisturizing creams or ointments. But doctors don't yet know if this actually helps prevent eczema from happening later.    If you use cream or ointment on your , wash your hands first. This helps lower the risk of getting germs on the baby's skin that could cause infection. Try to use products that come in a tube instead of a jar that you dip your fingers in.    When should I call the doctor?  Call for emergency help right away (in the  and Jake, call ) if:    ?You have signs of a very bad allergic reaction called "anaphylaxis." These include:    •Hives – Raised, inflamed, red patches of skin that are very itchy.    •Angioedema – A condition that causes puffiness, usually of the face, eyelids, ears, mouth, hands, or feet.    •Redness or itching of the skin on most of the body (without hives)    •Swelling or itching of the eyes    •Runny nose or swelling of the tongue    •Trouble breathing, wheezing, or voice changes    •Vomiting or diarrhea    •Feeling dizzy or passing out    Call for advice if:    ?You have signs of an infection – These include a fever of 100.4°F (38°C) or higher, or chills.    ?Your eczema is making you feel anxious or depressed – There are treatments that can help with this.    ?You have trouble sleeping because you are itching.    ?Your eczema:    •Has pus or yellow scabs on it    •Gets worse or is covering most of your body    •Is on your eyes or lips, or if you notice a rash or blisters in your mouth    •Gets worse after you were around someone with cold sores or fever blisters

## 2023-05-30 NOTE — ED PROVIDER NOTE - CLINICAL SUMMARY MEDICAL DECISION MAKING FREE TEXT BOX
51 y/o postmenopausal F with hx of eczema here for medication refill of her Triamcinolone acetate cream for eczema rash 53 y/o postmenopausal F with hx of eczema here for medication refill of her Triamcinolone acetate cream for eczema rash     Differential diagnosis includes contact//atopic//eczematous dermatitis, psoriasis. History and exam findings not consistent with dangerous etiologies of rash such as SJS/TEN, or secondary dangerous causes such as petechial rashes from thrombocytopenia or rickettsial infections. Plan at this time is to treat symptomatically, instruct to follow up with PCP PRN but should see the dermatologist as scheduled. Triamcinolone ointment refilled. Discussed return precautions. Pt verbalized an understanding and agrees with the plan.

## 2023-06-23 NOTE — PHYSICAL THERAPY INITIAL EVALUATION ADULT - DIAGNOSIS, PT EVAL
Problem: Depression  Goal: Will be euthymic at discharge  Description: INTERVENTIONS:  1. Administer medication as ordered  2. Provide emotional support via 1:1 interaction with staff  3. Encourage involvement in milieu/groups/activities  4. Monitor for social isolation  Outcome: Progressing     Problem: Self Harm/Suicidality  Goal: Will have no self-injury during hospital stay  Description: INTERVENTIONS:  1. Ensure constant observer at bedside with Q15M safety checks  2. Maintain a safe environment  3. Secure patient belongings  4. Ensure family/visitors adhere to safety recommendations  5. Ensure safety tray has been added to patient's diet order  6. Every shift and PRN: Re-assess suicidal risk via Frequent Screener    Outcome: Progressing  Flowsheets (Taken 6/22/2023 1930)  Will have no self-injury during hospital stay: Maintain a safe environment   Nurses Note;      1900 to 0700:      Report received from outgoing day shift RN. Assumed care of patient. On initial round Patient is in room, awake, alert, oriented, resting in bed . Patient  denies S/I, H/I, A/V/H, pain, depression, anxiety. Patient is compliant  with HS medications. Patient received no PRN. Patient maintained NPO for ECT. Patient observed resting in bed during shift rounds. Continuously hourly rounds and q 15 minute checks maintained during shift for care and safety. Patient slept for  8 hrs. [id number] unsteadiness on feet

## 2024-01-09 NOTE — H&P PST ADULT - NSICDXPASTMEDICALHX_GEN_ALL_CORE_FT
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 No surgery    Sickle cell trait

## 2024-02-07 NOTE — ED ADULT NURSE NOTE - NS PRO AD NO ADVANCE DIRECTIVE
Attempted to call patient for a 3rd time; voicemail box full. Unable to leave voicemail. TTRYL sent.    No

## 2024-02-19 NOTE — ED PROVIDER NOTE - CPE EDP RESP NORM
patient with class I obesity with a weight currently 183 and a BMI of 31.45.  She is interested in medical options to assist with weight loss.  Options available include Adipex, orlistat, Topamax, the new GLP-1 receptor agonist if showing significant weight loss associations and has been recently FDA approved.  Patient may qualify but cost to be prohibiting factor.   normal...

## 2024-10-07 NOTE — ED ADULT NURSE NOTE - NS ED NURSE LEVEL OF CONSCIOUSNESS MENTAL STATUS
H&P reviewed. The patient was examined and there are no changes to the H&P.   Awake/Alert/Cooperative

## 2025-01-08 NOTE — ED PROVIDER NOTE - OBJECTIVE STATEMENT
Health Maintenance       Pneumococcal Vaccine 0-64 (1 of 2 - PCV)  Never done    DTaP/Tdap/Td Vaccine (1 - Tdap)  Never done    Hepatitis B Vaccine (1 of 3 - 19+ 3-dose series)  Never done    Shingles Vaccine (1 of 2)  Never done    Diabetes Eye Exam (Yearly)  Overdue since 11/9/2018    Diabetes Foot Exam (Yearly)  Overdue since 6/5/2024    Influenza Vaccine (1)  Overdue since 9/1/2024    COVID-19 Vaccine (1 - 2024-25 season)  Never done    Medicare Advantage- Medicare Wellness Visit (Yearly - January to December)  Due since 1/1/2025           Following review of the above:  Patient is not proceeding with: Diabetes Eye Exam, Diabetes Foot Exam, COVID-19, Dtap/Tdap/Td, Influenza, Pneumococcal, and Shingles    Note: Refer to final orders and clinician documentation.        47 y/o F BIB EMS after being found on the street intoxicated.  Pt noted to have abrasions and swelling to face and lip.  Pt is intoxicated, screaming incomprehensibly, unable to provide additional history.  Pt grabbing and striking staff members, attempting to climb out of stretcher, safety risk to herself and others.  PRN medications given to ensure safety.

## 2025-05-25 NOTE — DISCHARGE NOTE PROVIDER - CARE PROVIDER_API CALL
<-- Click to add NO pertinent Family History
Daniel Rdz (MD)  Surgery  Critical Care  [address] [place] [address], [address]  [place] [address]  Phone: [phone]  Fax: [phone]  Follow Up Time:

## 2025-06-03 NOTE — ED PROVIDER NOTE - INCIDENT LOCATION
Rx Refill Note  Requested Prescriptions     Pending Prescriptions Disp Refills    NIFEdipine XL (PROCARDIA XL) 30 MG 24 hr tablet [Pharmacy Med Name: NIFEDIPINE ER 30 MG TABLET, 24HR] 30 tablet 0     Sig: TAKE 1 TABLET BY MOUTH DAILY      Last office visit with prescribing clinician: 4/9/2025   Last telemedicine visit with prescribing clinician: Visit date not found   Next office visit with prescribing clinician: 4/8/2026                         Would you like a call back once the refill request has been completed: [] Yes [] No    If the office needs to give you a call back, can they leave a voicemail: [] Yes [] No    Manjula Ledbetter MA  06/03/25, 11:03 EDT   home

## 2025-07-08 NOTE — ED ADULT TRIAGE NOTE - RESPIRATORY RATE (BREATHS/MIN)
Discharge Instructions: Care After your DARION (Transesophageal Echocardiogram)    Please follow these guidelines to ensure your safety at home. Your body has to get rid of the anesthetic or sedation medication. This may take 24 to 48 hours. You may feel tired, groggy, or not quite yourself.     For the next 24 hours   Do not drive a car or any motorized vehicle or operate machinery                                                                                  If possible, have a responsible adult be with you until you have fully recovered from the effects of the sedation    Rest for the remainder of the day and don't do any strenuous activity    Do not smoke or drink any alcoholic beverages                                                                Do not make important decisions or sign any legal documents    Your balance may be affected by the medication so if you are using stairs, use extra caution or have someone assist you  .  If you are experiencing any nausea, eat only a light meal or snack for the remainder of the day    You may have a mild sore throat today.  Use throat lozenges or gargle with warm salt water as needed for discomfort.  Notify your doctor if you experience trouble swallowing, shortness of breath, chest or abdominal pain, or vomit blood.    Doctor's office phone number:       [phone]